# Patient Record
Sex: FEMALE | Race: WHITE | HISPANIC OR LATINO | Employment: OTHER | URBAN - METROPOLITAN AREA
[De-identification: names, ages, dates, MRNs, and addresses within clinical notes are randomized per-mention and may not be internally consistent; named-entity substitution may affect disease eponyms.]

---

## 2017-02-15 ENCOUNTER — OFFICE VISIT (OUTPATIENT)
Dept: ENDOCRINOLOGY | Facility: CLINIC | Age: 67
End: 2017-02-15
Payer: COMMERCIAL

## 2017-02-15 VITALS
WEIGHT: 100.5 LBS | HEART RATE: 76 BPM | HEIGHT: 59 IN | SYSTOLIC BLOOD PRESSURE: 118 MMHG | BODY MASS INDEX: 20.26 KG/M2 | DIASTOLIC BLOOD PRESSURE: 70 MMHG

## 2017-02-15 DIAGNOSIS — M81.0 OSTEOPOROSIS, POST-MENOPAUSAL: Primary | ICD-10-CM

## 2017-02-15 PROCEDURE — 99243 OFF/OP CNSLTJ NEW/EST LOW 30: CPT | Mod: S$GLB,,, | Performed by: INTERNAL MEDICINE

## 2017-02-15 PROCEDURE — 99999 PR PBB SHADOW E&M-EST. PATIENT-LVL III: CPT | Mod: PBBFAC,,, | Performed by: INTERNAL MEDICINE

## 2017-02-15 RX ORDER — ALENDRONATE SODIUM 70 MG/1
1 TABLET ORAL
Refills: 11 | COMMUNITY
Start: 2017-02-04 | End: 2017-09-05

## 2017-02-15 NOTE — LETTER
February 15, 2017      Vignesh Jones MD  [address]  [address]  [facility] [address]           [facility]  [address]  [facility] [address]  Phone: [phone]  Fax: [phone]          Patient: Alicia Toussaint   MR Number: [id number]   YOB: 1950   Date of Visit: 2/15/2017       Dear Dr. Vignesh Jones:    Thank you for referring Alicia Toussaint to me for evaluation. Attached you will find relevant portions of my assessment and plan of care.    If you have questions, please do not hesitate to call me. I look forward to following Alicia Toussaint along with you.    Sincerely,    Gulshan Aleman MD    Enclosure  CC:  No Recipients    If you would like to receive this communication electronically, please contact [email or web address] or [phone] to request more information on [employer] Link access.    For providers and/or their staff who would like to refer a patient to [facility], please contact us through our one-stop-shop provider referral line, [facility], at [phone].    If you feel you have received this communication in error or would no longer like to receive these types of communications, please e-mail [email or web address]

## 2017-02-15 NOTE — MR AVS SNAPSHOT
[facility]  [address]  [facility] [address]  Phone: [phone]  Fax: [phone]                  Alicia Toussaint   2/15/2017 1:30 PM   Office Visit    Description:  Female : 1950   Provider:  Gulshan Aleman MD   Department:  [facility] - Endo/Diab/Metab           Reason for Visit     Osteoporosis                To Do List           Goals (5 Years of Data)     None      [facility]     [facility] On Call Nurse [facility] -  Assistance  Registered nurses in the [facility] On Call Center provide clinical advisement, health education, appointment booking, and other advisory services.  Call for this free service at [phone].             Medications           Message regarding Medications     Verify the changes and/or additions to your medication regime listed below are the same as discussed with your clinician today.  If any of these changes or additions are incorrect, please notify your healthcare provider.        STOP taking these medications     azithromycin (Z-DHAVAL) 250 MG tablet Take 2 tablets by mouth on day 1; Take 1 tablet by mouth on days 2-5           Verify that the below list of medications is an accurate representation of the medications you are currently taking.  If none reported, the list may be blank. If incorrect, please contact your healthcare provider. Carry this list with you in case of emergency.           Current Medications     alendronate (FOSAMAX) 70 MG tablet Take 1 tablet by mouth every 7 days.    ascorbic acid (VITAMIN C) 100 MG tablet Take 100 mg by mouth once daily.    calcium carbonate 1250 MG capsule Take 1,250 mg by mouth 2 (two) times daily with meals.    fish oil-omega-3 fatty acids 300-1,000 mg capsule Take 2 g by mouth once daily.    multivitamin (MULTIVITAMIN) per tablet Take 1 tablet by mouth once daily.      alendronate-vitamin D3 (FOSAMAX PLUS D) 70-2,800 mg-unit per tablet Take 1 tablet by mouth every 7 days.           Clinical  "Reference Information           Your Vitals Were     BP Pulse Height Weight BMI    118/70 (BP Location: Left arm, Patient Position: Sitting, BP Method: Manual) 76 4' 11" (1.499 m) 45.6 kg (100 lb 8.5 oz) 20.3 kg/m2      Blood Pressure          Most Recent Value    BP  118/70      Allergies as of 2/15/2017     Aspirin    Tessalon [Benzonatate]      Immunizations Administered on Date of Encounter - 2/15/2017     None      [facility] Sign-Up     Activating your [facility] account is as easy as 1-2-3!     1) Visit [email or web address], select Sign Up Now, enter this activation code and your date of birth, then select Next.  [email or web address]  Expires: 4/1/2017  2:17 PM      2) Create a username and password to use when you visit [facility] in the future and select a security question in case you lose your password and select Next.    3) Enter your e-mail address and click Sign Up!    Additional Information  If you have questions, please e-mail [email or web address] or call [phone] to talk to our [facility] staff. Remember, [facility] is NOT to be used for urgent needs. For medical emergencies, dial 911.         Instructions    Osteoporosis:   Very petite  Osteoporosis of the lumbar spine only  Tolerating alendronate well  Recommend continue  Continue Calcium and Vit D 1 tablet per day    Continuing walking    BMD in one year Feb 2018       Language Assistance Services     ATTENTION: Language assistance services are available, free of charge. Please call [phone].      ATENCIÓN: Si habla español, tiene a ramos disposición servicios gratuitos de asistencia lingüística. Llame al [phone].     CHRIS Ý: N?u b?n nói Ti?ng Vi?t, có các d?ch v? h? tr? ngôn ng? mi?n phí dành cho b?n. G?i s? [phone].         [facility] complies with applicable Federal civil rights laws and does not discriminate on the basis of race, color, national origin, age, disability, or sex.        "

## 2017-02-15 NOTE — PATIENT INSTRUCTIONS
Osteoporosis:   Very petite  Osteoporosis of the lumbar spine only  Tolerating alendronate well  Recommend continue  Continue Calcium and Vit D 1 tablet per day    Continuing walking    BMD in one year Feb 2018

## 2017-02-15 NOTE — PROGRESS NOTES
Subjective:      Patient ID: Alicia Toussaint is a 66 y.o. female.    Chief Complaint:  Osteoporosis      History of Present Illness  Osteoporosis:  No hx of fractures  In past running    Lumbar spine -3  Fem neck -0.9 and-1.3  Total hip -1.2 and -1.4    Height 4 feet 11 inches  High school weight 98 lb  Weight loss one year ago. Worries  Sleep disorder in 2015 and ok    Children 2, + breast feeding 6 months  1 grandchild    Exercise:Walking 6 days per week    Fosamax for 4 months   No symptoms      Review of Systems   Constitutional: Negative for fatigue and unexpected weight change.   HENT: Negative for hearing loss.    Eyes: Negative for visual disturbance.   Respiratory: Negative for apnea, cough, chest tightness and shortness of breath.    Cardiovascular: Negative for chest pain, palpitations and leg swelling.   Gastrointestinal: Positive for constipation. Negative for abdominal pain, diarrhea, nausea and vomiting.   Genitourinary: Negative for dysuria, frequency, menstrual problem and vaginal discharge.   Musculoskeletal: Negative for arthralgias, back pain and gait problem.   Skin: Negative for rash.   Neurological: Negative for dizziness, tremors, weakness, light-headedness, numbness and headaches.   Psychiatric/Behavioral: Negative for sleep disturbance. The patient is not nervous/anxious.        Objective:   Physical Exam   Constitutional: She is oriented to person, place, and time. She appears well-developed and well-nourished.   Very petite   HENT:   Head: Normocephalic and atraumatic.   Eyes: EOM are normal. Pupils are equal, round, and reactive to light. No scleral icterus.   Neck: No thyromegaly present.   Cardiovascular: Normal rate, regular rhythm, normal heart sounds and intact distal pulses.  Exam reveals no gallop.    No murmur heard.  Pulmonary/Chest: Effort normal and breath sounds normal. No respiratory distress. She has no wheezes. She has no rales. She exhibits no tenderness.   Abdominal: Soft.  Bowel sounds are normal. She exhibits no mass. There is no tenderness. There is no rebound.   Musculoskeletal: Normal range of motion. She exhibits no edema or tenderness.   Tandem gait normal   Lymphadenopathy:     She has no cervical adenopathy.   Neurological: She is alert and oriented to person, place, and time. She has normal reflexes. No cranial nerve deficit. Coordination normal.   Skin: Skin is warm and dry. No rash noted.   Psychiatric: She has a normal mood and affect. Her behavior is normal. Thought content normal.   Vitals reviewed.      Lab Review:   Results for orders placed or performed in visit on 10/11/16   Comprehensive metabolic panel   Result Value Ref Range    Glucose 86 65 - 99 mg/dL    BUN, Bld 17 7 - 25 mg/dL    Creatinine 0.69 0.50 - 0.99 mg/dL    eGFR if non  92 > OR = 60 mL/min/1.73m2    eGFR if African American 107 > OR = 60 mL/min/1.73m2    BUN/Creatinine Ratio NOT APPLICABLE 6 - 22 (calc)    Sodium 140 135 - 146 mmol/L    Potassium 4.5 3.5 - 5.3 mmol/L    Chloride 105 98 - 110 mmol/L    CO2 27 20 - 31 mmol/L    Calcium 9.4 8.6 - 10.4 mg/dL    Total Protein 6.5 6.1 - 8.1 g/dL    Albumin 4.1 3.6 - 5.1 g/dL    Globulin, Total 2.4 1.9 - 3.7 g/dL (calc)    Albumin/Globulin Ratio 1.7 1.0 - 2.5 (calc)    Total Bilirubin 0.6 0.2 - 1.2 mg/dL    Alkaline Phosphatase 55 33 - 130 U/L    AST 22 10 - 35 U/L    ALT 17 6 - 29 U/L   Lipid panel   Result Value Ref Range    Non HDL Chol. (LDL+VLDL) 115 mg/dL (calc)   CBC auto differential   Result Value Ref Range    WBC 6.6 3.8 - 10.8 Thousand/uL    RBC 4.80 3.80 - 5.10 Million/uL    Hemoglobin 14.2 11.7 - 15.5 g/dL    Hematocrit 42.6 35.0 - 45.0 %    MCV 88.8 80.0 - 100.0 fL    MCH 29.6 27.0 - 33.0 pg    MCHC 33.3 32.0 - 36.0 g/dL    RDW 14.2 11.0 - 15.0 %    Platelets 213 140 - 400 Thousand/uL    MPV 9.6 7.5 - 11.5 fL    Neutrophils Absolute 3115 1500 - 7800 cells/uL    Lymph # 2699 850 - 3900 cells/uL    Mono # 521 200 - 950 cells/uL     Eos # 257 15 - 500 cells/uL    Baso # 7 0 - 200 cells/uL    Neutrophils Relative 47.2 %    Lymph% 40.9 %    Mono% 7.9 %    Eosinophil% 3.9 %    Basophil% 0.1 %   Hemoglobin A1c   Result Value Ref Range    A1c 5.8 (H) <5.7 % of total Hgb   TSH   Result Value Ref Range    TSH 4.28 0.40 - 4.50 mIU/L   Cholesterol, total   Result Value Ref Range    Cholesterol 190 125 - 200 mg/dL   HDL cholesterol   Result Value Ref Range    HDL 75 > OR = 46 mg/dL   Triglycerides   Result Value Ref Range    Triglycerides 60 <150 mg/dL   LDL-Cholesterol   Result Value Ref Range    LDL Cholesterol 103 <130 mg/dL (calc)   HDL/Cholesterol Ratio   Result Value Ref Range    HDL/Chol Ratio 2.5 < OR = 5.0 (calc)         Assessment:     Osteoporosis:   Very petite  Osteoporosis of the lumbar spine only  Tolerating alendronate well  Recommend continue  Continue Calcium and Vit D 1 tablet per day    Continuing walking    BMD in one year Feb 2018    Plan:   Return PRN  PRR

## 2017-05-19 PROBLEM — Z11.59 ENCOUNTER FOR HEPATITIS C SCREENING TEST FOR LOW RISK PATIENT: Status: ACTIVE | Noted: 2017-05-19

## 2017-07-23 PROBLEM — R63.4 ABNORMAL WEIGHT LOSS: Status: ACTIVE | Noted: 2017-07-23

## 2017-07-23 PROBLEM — E55.9 VITAMIN D DEFICIENCY: Status: ACTIVE | Noted: 2017-07-23

## 2017-07-23 PROBLEM — E78.00 HYPERCHOLESTEREMIA: Status: ACTIVE | Noted: 2017-07-23

## 2017-07-23 PROBLEM — R79.89 ELEVATED TSH: Status: ACTIVE | Noted: 2017-07-23

## 2017-07-31 PROBLEM — Z78.9 KNOWN MEDICAL PROBLEMS: Status: ACTIVE | Noted: 2017-07-31

## 2017-09-05 ENCOUNTER — OFFICE VISIT (OUTPATIENT)
Dept: OBSTETRICS AND GYNECOLOGY | Facility: CLINIC | Age: 67
End: 2017-09-05
Attending: OBSTETRICS & GYNECOLOGY
Payer: MEDICARE

## 2017-09-05 VITALS
WEIGHT: 95.88 LBS | SYSTOLIC BLOOD PRESSURE: 120 MMHG | HEIGHT: 59 IN | DIASTOLIC BLOOD PRESSURE: 78 MMHG | BODY MASS INDEX: 19.33 KG/M2

## 2017-09-05 DIAGNOSIS — Z78.0 POSTMENOPAUSAL STATUS: ICD-10-CM

## 2017-09-05 DIAGNOSIS — Z01.419 WELL WOMAN EXAM WITH ROUTINE GYNECOLOGICAL EXAM: Primary | ICD-10-CM

## 2017-09-05 DIAGNOSIS — M81.0 AGE-RELATED OSTEOPOROSIS WITHOUT CURRENT PATHOLOGICAL FRACTURE: ICD-10-CM

## 2017-09-05 DIAGNOSIS — Z12.31 VISIT FOR SCREENING MAMMOGRAM: ICD-10-CM

## 2017-09-05 PROCEDURE — G0101 CA SCREEN;PELVIC/BREAST EXAM: HCPCS | Mod: S$GLB,,, | Performed by: OBSTETRICS & GYNECOLOGY

## 2017-09-05 PROCEDURE — 99999 PR PBB SHADOW E&M-EST. PATIENT-LVL III: CPT | Mod: PBBFAC,,, | Performed by: OBSTETRICS & GYNECOLOGY

## 2017-09-05 RX ORDER — RISEDRONATE SODIUM 150 MG/1
150 TABLET, FILM COATED ORAL
Qty: 1 TABLET | Refills: 11 | Status: SHIPPED | OUTPATIENT
Start: 2017-09-05 | End: 2017-09-22

## 2017-09-05 RX ORDER — RISEDRONATE SODIUM 35 MG/1
35 TABLET, FILM COATED ORAL
Qty: 30 TABLET | Refills: 11 | Status: CANCELLED | OUTPATIENT
Start: 2017-09-05 | End: 2018-09-05

## 2017-09-05 NOTE — PROGRESS NOTES
Alicia Toussaint is a 67 y.o. year old  female who presents for routine GYN exam.  She is postmenopausal, not on HRT.  No bleeding.  Denies hot flashes / sweats.  She has a history of osteoporosis and has been taking Fosamax weekly, seeing Dr. Aleman.  Her last BMD was two years ago.  Denies changes in her medical / surgical history over the past year.  No GYN complaints.    Pap 2016: Negative    BMD 9/11/15: Osteoporosis (Spine T-3.0, Hip T-.9)      Past Medical History:   Diagnosis Date    Abnormal weight loss 2017    Complication of anesthesia     colonoscopy-cardiac arrest from anes    Elevated TSH 2017    Hypercholesteremia 2017    Osteoporosis, post-menopausal     Vitamin D deficiency 2017       Past Surgical History:   Procedure Laterality Date    COLONOSCOPY      KNEE ARTHROPLASTY  1985    TUBAL LIGATION         OB History      Para Term  AB Living    2 2 2     2    SAB TAB Ectopic Multiple Live Births            2            ROS:  GENERAL: Reports some weight gain from last year.   SKIN: Denies rash or lesions.   HEAD: Denies head injury or headache.   NODES: Denies enlarged lymph nodes.   CHEST: Denies chest pain or shortness of breath.   CARDIOVASCULAR: Denies palpitations or left sided chest pain.   ABDOMEN: Reports some constipation.  No abdominal pain, nausea, vomiting or rectal bleeding.   URINARY: No dysuria or hematuria.  REPRODUCTIVE: See HPI.   BREASTS: Denies pain, lumps, or nipple discharge.   HEMATOLOGIC: No easy bruisability or excessive bleeding.   MUSCULOSKELETAL: Denies joint pain.  NEUROLOGIC: Denies syncope or weakness.   PSYCHIATRIC: Denies depression.     PE:   (chaperone present during entire exam)  APPEARANCE: Well nourished, well developed, in no acute distress.  BREASTS: Symmetrical, no skin changes or visible lesions. No palpable masses, nipple discharge or adenopathy bilaterally.  ABDOMEN: Soft. No tenderness or masses.  No hernias. No CVA tenderness.  VULVA: Atrophic. No lesions. Normal female genitalia.  URETHRAL MEATUS: Normal size and location, no lesions, no prolapse.  URETHRA: No masses, tenderness, prolapse or scarring.  VAGINA: Atrophic.  No lesions, no discharge, no significant cystocele or rectocele.  CERVIX: No lesions and discharge.   UTERUS: Normal size, regular shape, mobile, non-tender, bladder base nontender.  ADNEXA: No masses, tenderness or CDS nodularity.  ANUS PERINEUM: Normal.    Diagnosis:  1. Well woman exam with routine gynecological exam    2. Postmenopausal status    3. Visit for screening mammogram    4. Age-related osteoporosis without current pathological fracture          PLAN:    Orders Placed This Encounter    Mammo Digital Screening Bilat with Billy without CAD    DXA Bone Density Spine And Hip    risedronate (ACTONEL) 150 MG Tab       Patient was counseled today on postmenopausal issues, including osteoporosis.  She wants to convert from weekly Fosamax to monthly Actonel.  We reviewed Actonel: r / b / studies, correct usage.  To update BMD this year.    Follow-up in 1 year.

## 2017-09-22 ENCOUNTER — TELEPHONE (OUTPATIENT)
Dept: OBSTETRICS AND GYNECOLOGY | Facility: CLINIC | Age: 67
End: 2017-09-22

## 2017-09-22 NOTE — TELEPHONE ENCOUNTER
----- Message from Bird Shelton sent at 9/22/2017  4:40 PM CDT -----  Contact: Jazmin with [facility]  x_ 1st Request   _ 2nd Request   _ 3rd Request     Who: JACQUELINE JUNE [[id number]]    Why: Pharmacy is requesting refill on Rx alendronate (FOSAMAX) 70 MG tablet be called in to [phone]    What Number to Call Back: [phone]    When to Expect a call back: (Before the end of the day)   -- if call after 3:00 call back will be tomorrow.

## 2017-09-25 ENCOUNTER — TELEPHONE (OUTPATIENT)
Dept: OBSTETRICS AND GYNECOLOGY | Facility: CLINIC | Age: 67
End: 2017-09-25

## 2017-09-25 NOTE — TELEPHONE ENCOUNTER
----- Message from Noel Lewis MA sent at 9/25/2017 11:49 AM CDT -----  Contact: Krista with [facility] in [place]  Pt was prescribed FOSAMAX PLUS D and Krista was calling to see if the pt can be prescribed just the Fosamax and get the Vitamin D over the counter so that her insurance can cover cost.  Krista can be reached at [phone].  Thanks FL

## 2017-09-28 ENCOUNTER — TELEPHONE (OUTPATIENT)
Dept: OBSTETRICS AND GYNECOLOGY | Facility: CLINIC | Age: 67
End: 2017-09-28

## 2017-09-28 RX ORDER — ALENDRONATE SODIUM 70 MG/1
70 TABLET ORAL
Qty: 4 TABLET | Refills: 11 | Status: SHIPPED | OUTPATIENT
Start: 2017-09-28 | End: 2019-07-31 | Stop reason: ALTCHOICE

## 2017-09-28 NOTE — TELEPHONE ENCOUNTER
----- Message from Iveth Ugarte sent at 9/28/2017  1:16 PM CDT -----  Contact: self  x_  1st Request  _  2nd Request  _  3rd Request    Who: pt    Why: pt needs Please send Rx for Fosamax without vitamin D. Refilled.. Please advise...    What Number to Call Back: [phone] or [phone]  When to Expect a call back: (Before the end of the day)   -- if call after 3:00 call back will be tomorrow.

## 2017-10-13 ENCOUNTER — HOSPITAL ENCOUNTER (OUTPATIENT)
Dept: RADIOLOGY | Facility: OTHER | Age: 67
Discharge: HOME | End: 2017-10-13
Attending: OBSTETRICS & GYNECOLOGY
Payer: MEDICARE

## 2017-10-13 DIAGNOSIS — M81.0 AGE-RELATED OSTEOPOROSIS WITHOUT CURRENT PATHOLOGICAL FRACTURE: ICD-10-CM

## 2017-10-13 DIAGNOSIS — Z12.31 VISIT FOR SCREENING MAMMOGRAM: ICD-10-CM

## 2017-10-13 PROCEDURE — 77063 BREAST TOMOSYNTHESIS BI: CPT | Mod: 26,,, | Performed by: RADIOLOGY

## 2017-10-13 PROCEDURE — 77080 DXA BONE DENSITY AXIAL: CPT | Mod: 26,,, | Performed by: INTERNAL MEDICINE

## 2017-10-13 PROCEDURE — 77067 SCR MAMMO BI INCL CAD: CPT | Mod: TC

## 2017-10-13 PROCEDURE — 77067 SCR MAMMO BI INCL CAD: CPT | Mod: 26,,, | Performed by: RADIOLOGY

## 2017-10-13 PROCEDURE — 77080 DXA BONE DENSITY AXIAL: CPT | Mod: TC

## 2017-10-18 ENCOUNTER — TELEPHONE (OUTPATIENT)
Dept: OBSTETRICS AND GYNECOLOGY | Facility: CLINIC | Age: 67
End: 2017-10-18

## 2017-10-18 NOTE — TELEPHONE ENCOUNTER
Called patient:    Discussed results of BMD:    A quantitative bone mineral density was obtained using the DEXA technique.  The bone mineral density measured from L1 through L4 is 0.849g/cm2.  This corresponds to a T score of -2.8 and a Z score of -0.4.  This is 3.5% higher compared to prior.    The bone mineral density within the left femoral neck measures 0.868 g/cm2.  This corresponds to a T score of    -1.2 and a Z score of 0.7.    The bone mineral density within the right femoral neck measures 0.863 g/cm2.  This corresponds to a T score of    -0.3 and a Z score of 0.7.    Mean femur neck density is -2.4% compared to prior.    The FRAX score (10 year probability of fracture) is 4.1% for major osteoporotic fracture and 0.4% for hip fracture.      Impression       Osteoporosis     Previously Spine T-3.0, Hip T-.8    REC: continue Fosamax, calcium, vitamin D    Aware of negative mammogram

## 2018-04-19 ENCOUNTER — TELEPHONE (OUTPATIENT)
Dept: OBSTETRICS AND GYNECOLOGY | Facility: CLINIC | Age: 68
End: 2018-04-19

## 2018-04-19 NOTE — TELEPHONE ENCOUNTER
----- Message from Kate Chapman sent at 4/19/2018  8:24 AM CDT -----  Contact: self  Pt called to find out if she can be seen tomorrow because she think she has a yeast infection. She can be reached at [phone]

## 2018-04-20 ENCOUNTER — OFFICE VISIT (OUTPATIENT)
Dept: OBSTETRICS AND GYNECOLOGY | Facility: CLINIC | Age: 68
End: 2018-04-20
Attending: OBSTETRICS & GYNECOLOGY
Payer: MEDICARE

## 2018-04-20 VITALS
WEIGHT: 95 LBS | BODY MASS INDEX: 19.15 KG/M2 | HEIGHT: 59 IN | SYSTOLIC BLOOD PRESSURE: 120 MMHG | DIASTOLIC BLOOD PRESSURE: 82 MMHG

## 2018-04-20 DIAGNOSIS — N94.9 VAGINAL DISCOMFORT: ICD-10-CM

## 2018-04-20 DIAGNOSIS — R35.0 URINARY FREQUENCY: Primary | ICD-10-CM

## 2018-04-20 DIAGNOSIS — Z78.0 POSTMENOPAUSAL STATUS: ICD-10-CM

## 2018-04-20 PROCEDURE — 87088 URINE BACTERIA CULTURE: CPT

## 2018-04-20 PROCEDURE — 99999 PR PBB SHADOW E&M-EST. PATIENT-LVL III: CPT | Mod: PBBFAC,,, | Performed by: OBSTETRICS & GYNECOLOGY

## 2018-04-20 PROCEDURE — 99213 OFFICE O/P EST LOW 20 MIN: CPT | Mod: S$GLB,,, | Performed by: OBSTETRICS & GYNECOLOGY

## 2018-04-20 PROCEDURE — 87086 URINE CULTURE/COLONY COUNT: CPT

## 2018-04-20 PROCEDURE — 87510 GARDNER VAG DNA DIR PROBE: CPT

## 2018-04-20 PROCEDURE — 87186 SC STD MICRODIL/AGAR DIL: CPT

## 2018-04-20 PROCEDURE — 87077 CULTURE AEROBIC IDENTIFY: CPT

## 2018-04-20 PROCEDURE — 87480 CANDIDA DNA DIR PROBE: CPT

## 2018-04-20 RX ORDER — NITROFURANTOIN 25; 75 MG/1; MG/1
100 CAPSULE ORAL 2 TIMES DAILY
Qty: 14 CAPSULE | Refills: 0 | Status: SHIPPED | OUTPATIENT
Start: 2018-04-20 | End: 2018-04-27

## 2018-04-20 NOTE — PROGRESS NOTES
Chief Complaint   Patient presents with    Urinary Tract Infection     burning     Urinary Frequency    Vaginal Pain       HPI:  Alicia Toussaint is a 67 y.o. female patient  who presents today for evaluation of urinary symptoms as well as questionable vaginal discomfort.  For the past 5 days, she describes having urinary urgency, frequency, and dysuria.  She has increased her water intake but still has these symptoms.  She is unsure if she has vaginal discomfort as well.  Denies vaginal itching, discharge, or odor.  Reports being on amoxicillin three weeks ago for a root canal.  No LMP recorded. Patient is postmenopausal.     Urine dip: +1 blood, +leukocytes    Past Medical History:   Diagnosis Date    Abnormal weight loss 2017    Complication of anesthesia     colonoscopy-cardiac arrest from anes    Elevated TSH 2017    Hypercholesteremia 2017    Osteoporosis, post-menopausal     Vitamin D deficiency 2017       Past Surgical History:   Procedure Laterality Date    COLONOSCOPY  2016    KNEE ARTHROPLASTY  1985    TUBAL LIGATION           ROS:  GENERAL: Feeling well overall.   SKIN: Denies rash or lesions.   HEAD: Denies head injury or headache.   NODES: Denies enlarged lymph nodes.   CHEST: Denies chest pain or shortness of breath.   CARDIOVASCULAR: Denies palpitations or left sided chest pain.   ABDOMEN: No abdominal pain, nausea, vomiting or rectal bleeding.   URINARY: Reports frequency, urgency, and dysuria.  REPRODUCTIVE: See HPI.   BREASTS: Denies pain, lumps, or nipple discharge.   HEMATOLOGIC: No easy bruisability or excessive bleeding.   MUSCULOSKELETAL: Denies joint pain or swelling.   NEUROLOGIC: Denies syncope or weakness.   PSYCHIATRIC: Denies depression.    PE:   (chaperone present during entire exam)  APPEARANCE: Well nourished, well developed, in no acute distress.  ABDOMEN: Soft.  Slight suprapubic discomfort.  No guarding.  No rebound.  VULVA: Atrophic.  No  lesions. Normal female genitalia.  URETHRAL MEATUS: Normal size and location, no lesions, no prolapse.  URETHRA: No masses, tenderness, prolapse or scarring.  VAGINA: Atrophic, no discharge, no significant cystocele or rectocele.  CERVIX: No lesions and discharge.   UTERUS: Normal size, regular shape, mobile, non-tender.  Bladder base mildly tender.  ADNEXA: No masses, tenderness or CDS nodularity.  ANUS PERINEUM: Normal.    Diagnosis:  1. Urinary frequency    2. Vaginal discomfort    3. Postmenopausal status          PLAN:    Orders Placed This Encounter    Urine culture    Vaginosis Screen by DNA Probe    nitrofurantoin, macrocrystal-monohydrate, (MACROBID) 100 MG capsule       Patient was counseled today on her exam findings which are consistent with a UTI.  She will begin Macrobid and we will send her urine for culture.  She was also encouraged to continue with increased water intake.  Affirm was performed to rule out vaginitis.    Follow-up for annual exam.    Total time of visit: 15 minutes (counseling >50% of time)

## 2018-04-21 LAB
CANDIDA RRNA VAG QL PROBE: NEGATIVE
G VAGINALIS RRNA GENITAL QL PROBE: NEGATIVE
T VAGINALIS RRNA GENITAL QL PROBE: NEGATIVE

## 2018-04-23 LAB — BACTERIA UR CULT: NORMAL

## 2018-09-07 ENCOUNTER — OFFICE VISIT (OUTPATIENT)
Dept: OBSTETRICS AND GYNECOLOGY | Facility: CLINIC | Age: 68
End: 2018-09-07
Attending: OBSTETRICS & GYNECOLOGY
Payer: MEDICARE

## 2018-09-07 VITALS
SYSTOLIC BLOOD PRESSURE: 102 MMHG | WEIGHT: 102.5 LBS | DIASTOLIC BLOOD PRESSURE: 70 MMHG | HEIGHT: 59 IN | BODY MASS INDEX: 20.66 KG/M2

## 2018-09-07 DIAGNOSIS — Z12.4 PAP SMEAR FOR CERVICAL CANCER SCREENING: ICD-10-CM

## 2018-09-07 DIAGNOSIS — M81.0 AGE-RELATED OSTEOPOROSIS WITHOUT CURRENT PATHOLOGICAL FRACTURE: ICD-10-CM

## 2018-09-07 DIAGNOSIS — Z78.0 POSTMENOPAUSAL STATUS: ICD-10-CM

## 2018-09-07 DIAGNOSIS — Z12.31 VISIT FOR SCREENING MAMMOGRAM: ICD-10-CM

## 2018-09-07 DIAGNOSIS — Z01.419 WELL WOMAN EXAM WITH ROUTINE GYNECOLOGICAL EXAM: Primary | ICD-10-CM

## 2018-09-07 PROCEDURE — G0101 CA SCREEN;PELVIC/BREAST EXAM: HCPCS | Mod: PBBFAC | Performed by: OBSTETRICS & GYNECOLOGY

## 2018-09-07 PROCEDURE — 99999 PR PBB SHADOW E&M-EST. PATIENT-LVL III: CPT | Mod: PBBFAC,,, | Performed by: OBSTETRICS & GYNECOLOGY

## 2018-09-07 PROCEDURE — 88175 CYTOPATH C/V AUTO FLUID REDO: CPT

## 2018-09-07 PROCEDURE — 99213 OFFICE O/P EST LOW 20 MIN: CPT | Mod: PBBFAC,25 | Performed by: OBSTETRICS & GYNECOLOGY

## 2018-09-07 PROCEDURE — G0101 CA SCREEN;PELVIC/BREAST EXAM: HCPCS | Mod: S$PBB,,, | Performed by: OBSTETRICS & GYNECOLOGY

## 2018-09-07 NOTE — PROGRESS NOTES
Alicia Toussaint is a 68 y.o. female  who presents for annual exam.  She is postmenopausal, not on HRT.  Denies bleeding, hot flashes, and sweats.  She has a history of osteoporosis and is currently on Fosamax.  For the past several months, she describes noting GI upset, sweating, and lightheadedness immediately after taking Fosamax.  Her last BMD was 10/2017 which showed improvement in her BMD.   No LMP recorded. Patient is postmenopausal.     BMD 10/13/17:  Osteoporosis (Spine T-2.8, Hip T-1.2, T-.3)        Past Medical History:   Diagnosis Date    Abnormal weight loss 2017    Complication of anesthesia     colonoscopy-cardiac arrest from anes    Elevated TSH 2017    Hypercholesteremia 2017    Osteoporosis, post-menopausal     Vitamin D deficiency 2017       Past Surgical History:   Procedure Laterality Date    COLONOSCOPY      KNEE ARTHROPLASTY  1985    TUBAL LIGATION         OB History      Para Term  AB Living    2 2 2     2    SAB TAB Ectopic Multiple Live Births            2          ROS:  GENERAL: Reports some weight gain.   SKIN: Denies rash or lesions.   HEAD: Denies head injury or headache.   NODES: Denies enlarged lymph nodes.   CHEST: Denies chest pain or shortness of breath.   CARDIOVASCULAR: Denies palpitations or left sided chest pain.   ABDOMEN: No abdominal pain, nausea, vomiting or rectal bleeding.   URINARY: No dysuria or hematuria.  REPRODUCTIVE: See HPI.   BREASTS: Denies pain, lumps, or nipple discharge.   HEMATOLOGIC: No easy bruisability or excessive bleeding.   MUSCULOSKELETAL: Denies joint pain or swelling.   NEUROLOGIC: Denies syncope or weakness.   PSYCHIATRIC: Denies depression.    PE:   (chaperone present during entire exam)  APPEARANCE: Well nourished, well developed, in no acute distress.  BREASTS: Symmetrical, no skin changes or visible lesions. No palpable masses, nipple discharge or adenopathy bilaterally.  ABDOMEN: Soft. No  tenderness or masses. No CVA tenderness.  VULVA: Atrophic. No lesions. Normal female genitalia.  URETHRAL MEATUS: Normal size and location, no lesions, no prolapse.  URETHRA: No masses, tenderness, prolapse or scarring.  VAGINA: Atrophic, no discharge, no significant cystocele or rectocele.  CERVIX: No lesions and discharge. Stenotic.  PAP done.  UTERUS: Normal size, regular shape, mobile, non-tender, bladder base nontender.  ADNEXA: No masses, tenderness or CDS nodularity.  ANUS PERINEUM: Normal.      Diagnosis:  1. Well woman exam with routine gynecological exam    2. Postmenopausal status    3. Pap smear for cervical cancer screening    4. Visit for screening mammogram    5. Age-related osteoporosis without current pathological fracture          PLAN:    Orders Placed This Encounter    Mammo Digital Screening Bilat with Tomos[employer]_CAD    Ambulatory consult to Endocrinology    Liquid-based pap smear, screening       Patient was counseled today on postmenopausal issues, including osteoporosis and her usage of Fosamax.  With GI upset, we discussed possible conversion to an injectable treatment.  She has seen Dr. Aleman in the past for her osteoporosis.  We will schedule an Endocrine consult to discuss a management plan for her osteoporosis.    Follow-up in 1 year.

## 2018-10-15 ENCOUNTER — HOSPITAL ENCOUNTER (OUTPATIENT)
Dept: RADIOLOGY | Facility: OTHER | Age: 68
Discharge: HOME | End: 2018-10-15
Attending: OBSTETRICS & GYNECOLOGY
Payer: MEDICARE

## 2018-10-15 DIAGNOSIS — Z12.31 VISIT FOR SCREENING MAMMOGRAM: ICD-10-CM

## 2018-10-15 PROCEDURE — 77067 SCR MAMMO BI INCL CAD: CPT | Mod: TC

## 2018-10-15 PROCEDURE — 77067 SCR MAMMO BI INCL CAD: CPT | Mod: 26,,, | Performed by: RADIOLOGY

## 2018-10-15 PROCEDURE — 77063 BREAST TOMOSYNTHESIS BI: CPT | Mod: 26,,, | Performed by: RADIOLOGY

## 2018-10-15 PROCEDURE — 77063 BREAST TOMOSYNTHESIS BI: CPT | Mod: TC

## 2018-12-30 PROBLEM — M19.90 ARTHRITIS: Status: ACTIVE | Noted: 2018-12-30

## 2019-09-10 ENCOUNTER — OFFICE VISIT (OUTPATIENT)
Dept: OBSTETRICS AND GYNECOLOGY | Facility: CLINIC | Age: 69
End: 2019-09-10
Attending: OBSTETRICS & GYNECOLOGY
Payer: MEDICARE

## 2019-09-10 VITALS — DIASTOLIC BLOOD PRESSURE: 62 MMHG | BODY MASS INDEX: 18.12 KG/M2 | WEIGHT: 89.75 LBS | SYSTOLIC BLOOD PRESSURE: 112 MMHG

## 2019-09-10 DIAGNOSIS — M81.0 AGE-RELATED OSTEOPOROSIS WITHOUT CURRENT PATHOLOGICAL FRACTURE: ICD-10-CM

## 2019-09-10 DIAGNOSIS — Z01.419 WELL WOMAN EXAM WITH ROUTINE GYNECOLOGICAL EXAM: Primary | ICD-10-CM

## 2019-09-10 DIAGNOSIS — Z78.0 POSTMENOPAUSAL STATUS: ICD-10-CM

## 2019-09-10 DIAGNOSIS — Z12.31 VISIT FOR SCREENING MAMMOGRAM: ICD-10-CM

## 2019-09-10 PROCEDURE — G0101 CA SCREEN;PELVIC/BREAST EXAM: HCPCS | Mod: S$GLB,,, | Performed by: OBSTETRICS & GYNECOLOGY

## 2019-09-10 PROCEDURE — G0101 PR CA SCREEN;PELVIC/BREAST EXAM: ICD-10-PCS | Mod: S$GLB,,, | Performed by: OBSTETRICS & GYNECOLOGY

## 2019-09-10 PROCEDURE — 99999 PR PBB SHADOW E&M-EST. PATIENT-LVL III: ICD-10-PCS | Mod: PBBFAC,,, | Performed by: OBSTETRICS & GYNECOLOGY

## 2019-09-10 PROCEDURE — 99999 PR PBB SHADOW E&M-EST. PATIENT-LVL III: CPT | Mod: PBBFAC,,, | Performed by: OBSTETRICS & GYNECOLOGY

## 2019-10-16 ENCOUNTER — HOSPITAL ENCOUNTER (OUTPATIENT)
Dept: RADIOLOGY | Facility: OTHER | Age: 69
Discharge: HOME | End: 2019-10-16
Attending: OBSTETRICS & GYNECOLOGY
Payer: MEDICARE

## 2019-10-16 VITALS — BODY MASS INDEX: 18.09 KG/M2 | HEIGHT: 59 IN | WEIGHT: 89.75 LBS

## 2019-10-16 DIAGNOSIS — Z12.31 VISIT FOR SCREENING MAMMOGRAM: ICD-10-CM

## 2019-10-16 DIAGNOSIS — M81.0 AGE-RELATED OSTEOPOROSIS WITHOUT CURRENT PATHOLOGICAL FRACTURE: ICD-10-CM

## 2019-10-16 PROCEDURE — 77063 BREAST TOMOSYNTHESIS BI: CPT | Mod: 26,,, | Performed by: INTERNAL MEDICINE

## 2019-10-16 PROCEDURE — 77067 SCR MAMMO BI INCL CAD: CPT | Mod: TC

## 2019-10-16 PROCEDURE — 77080 DXA BONE DENSITY AXIAL: CPT | Mod: 26,,, | Performed by: INTERNAL MEDICINE

## 2019-10-16 PROCEDURE — 77063 MAMMO DIGITAL SCREENING BILAT WITH TOMOSYNTHESIS_CAD: ICD-10-PCS | Mod: 26,,, | Performed by: INTERNAL MEDICINE

## 2019-10-16 PROCEDURE — 77080 DXA BONE DENSITY AXIAL: CPT | Mod: TC

## 2019-10-16 PROCEDURE — 77067 MAMMO DIGITAL SCREENING BILAT WITH TOMOSYNTHESIS_CAD: ICD-10-PCS | Mod: 26,,, | Performed by: INTERNAL MEDICINE

## 2019-10-16 PROCEDURE — 77067 SCR MAMMO BI INCL CAD: CPT | Mod: 26,,, | Performed by: INTERNAL MEDICINE

## 2019-10-16 PROCEDURE — 77080 DEXA BONE DENSITY SPINE HIP: ICD-10-PCS | Mod: 26,,, | Performed by: INTERNAL MEDICINE

## 2019-10-17 ENCOUNTER — TELEPHONE (OUTPATIENT)
Dept: OBSTETRICS AND GYNECOLOGY | Facility: CLINIC | Age: 69
End: 2019-10-17

## 2019-10-17 NOTE — TELEPHONE ENCOUNTER
Called patient:    Message left to call us.    [BMD with osteoporosis in spine T-3.1 (previously T-3.0), osteopenia in hips T-1.3, T-1.4]

## 2019-10-23 ENCOUNTER — TELEPHONE (OUTPATIENT)
Dept: OBSTETRICS AND GYNECOLOGY | Facility: CLINIC | Age: 69
End: 2019-10-23

## 2019-10-23 DIAGNOSIS — M81.0 AGE RELATED OSTEOPOROSIS, UNSPECIFIED PATHOLOGICAL FRACTURE PRESENCE: Primary | ICD-10-CM

## 2019-10-23 NOTE — TELEPHONE ENCOUNTER
----- Message from Jayro Acharya sent at 10/23/2019  9:20 AM CDT -----  Contact: JACQUELINE JUNE [[id number]]  Type:  Patient Returning Call    Who Called: JACQUELINE JUNE [[id number]]    Who Left Message for Patient: Vignesh Jones MD      Does the patient know what this is regarding?:no     Best Call Back Number:[phone], [phone]      Additional Information:

## 2019-10-23 NOTE — TELEPHONE ENCOUNTER
----- Message from Lorena Bowie sent at 10/23/2019  9:15 AM CDT -----  Contact: Self  Type:  Patient Returning Call    Who Called: JACQUELINE JUNE [[id number]]    Who Left Message for Patient: Dr. Jones    Does the patient know what this is regarding?: Yes, test results    Best Call Back Number: [phone]    Additional Information: None     2

## 2019-10-24 NOTE — TELEPHONE ENCOUNTER
----- Message from Jayro Acharya sent at 10/24/2019  8:59 AM CDT -----  Contact: JACQUELINE JUNE [[id number]]  Type:  Patient Returning Call    Who Called: JACQUELINE JUNE [[id number]]    Who Left Message for Patient: Dr. Jones    Does the patient know what this is regarding?:yes     Best Call Back Number : [phone] (home)       Additional Information:

## 2019-10-24 NOTE — TELEPHONE ENCOUNTER
"Called patient:    Discussed results of BMD:    FINDINGS:  The bone mineral density measured from L1 through L4 is 0.817 g/cm2.  This corresponds to a T score of -3.1 and a Z score of -0.6.  The overall density of the spine is 3.8% lower compared to prior.  The bone mineral density within the left femoral neck measures 0.860 g/cm2.  This corresponds to a T score of    -1.3 and a Z score of 0.8.  The bone mineral density within the right femoral neck measures 0.847 g/cm2.  This corresponds to a T score of    -1.4 and a Z score of 0.7.  Total mean femur neck density is 3% lower compared to prior.  The FRAX score (10 year probability of fracture) is 4.2 % for a major osteoporotic fracture and 0.5 % for hip fracture.      Impression     Osteoporosis     Discussed increasing osteoporosis in spine.  In the past, she had been on Fosamax, but stopped secondary to to "jaw pain"    We discussed recommendation for endocrine consult - orders entered.  "

## 2019-10-24 NOTE — TELEPHONE ENCOUNTER
[phone] Number provided to patient to schedule an endocrine appointment. Patient will call and schedule an appointment

## 2019-10-29 ENCOUNTER — TELEPHONE (OUTPATIENT)
Dept: ENDOCRINOLOGY | Facility: CLINIC | Age: 69
End: 2019-10-29

## 2019-10-29 ENCOUNTER — OFFICE VISIT (OUTPATIENT)
Dept: ENDOCRINOLOGY | Facility: CLINIC | Age: 69
End: 2019-10-29
Payer: MEDICARE

## 2019-10-29 ENCOUNTER — LAB VISIT (OUTPATIENT)
Dept: LAB | Facility: HOSPITAL | Age: 69
End: 2019-10-29
Payer: MEDICARE

## 2019-10-29 VITALS
HEART RATE: 69 BPM | HEIGHT: 59 IN | BODY MASS INDEX: 18.76 KG/M2 | SYSTOLIC BLOOD PRESSURE: 110 MMHG | DIASTOLIC BLOOD PRESSURE: 67 MMHG | WEIGHT: 93.06 LBS

## 2019-10-29 DIAGNOSIS — E55.9 VITAMIN D DEFICIENCY: ICD-10-CM

## 2019-10-29 DIAGNOSIS — M81.0 OSTEOPOROSIS, POST-MENOPAUSAL: ICD-10-CM

## 2019-10-29 DIAGNOSIS — M81.0 OSTEOPOROSIS, POST-MENOPAUSAL: Primary | ICD-10-CM

## 2019-10-29 DIAGNOSIS — E78.00 HYPERCHOLESTEREMIA: ICD-10-CM

## 2019-10-29 DIAGNOSIS — M81.0 AGE RELATED OSTEOPOROSIS, UNSPECIFIED PATHOLOGICAL FRACTURE PRESENCE: ICD-10-CM

## 2019-10-29 DIAGNOSIS — R79.89 ELEVATED TSH: ICD-10-CM

## 2019-10-29 DIAGNOSIS — M81.0 AGE RELATED OSTEOPOROSIS, UNSPECIFIED PATHOLOGICAL FRACTURE PRESENCE: Primary | ICD-10-CM

## 2019-10-29 DIAGNOSIS — R94.6 ABNORMAL RESULTS OF THYROID FUNCTION STUDIES: ICD-10-CM

## 2019-10-29 LAB
25(OH)D3+25(OH)D2 SERPL-MCNC: 146 NG/ML (ref 30–96)
ALBUMIN SERPL BCP-MCNC: 4.1 G/DL (ref 3.5–5.2)
ALP SERPL-CCNC: 58 U/L (ref 55–135)
ALT SERPL W/O P-5'-P-CCNC: 24 U/L (ref 10–44)
ANION GAP SERPL CALC-SCNC: 8 MMOL/L (ref 8–16)
AST SERPL-CCNC: 28 U/L (ref 10–40)
BILIRUB SERPL-MCNC: 0.8 MG/DL (ref 0.1–1)
BUN SERPL-MCNC: 17 MG/DL (ref 8–23)
CALCIUM SERPL-MCNC: 9.8 MG/DL (ref 8.7–10.5)
CHLORIDE SERPL-SCNC: 103 MMOL/L (ref 95–110)
CO2 SERPL-SCNC: 30 MMOL/L (ref 23–29)
CREAT SERPL-MCNC: 0.8 MG/DL (ref 0.5–1.4)
EST. GFR  (AFRICAN AMERICAN): >60 ML/MIN/1.73 M^2
EST. GFR  (NON AFRICAN AMERICAN): >60 ML/MIN/1.73 M^2
GLUCOSE SERPL-MCNC: 96 MG/DL (ref 70–110)
POTASSIUM SERPL-SCNC: 4 MMOL/L (ref 3.5–5.1)
PROT SERPL-MCNC: 7.6 G/DL (ref 6–8.4)
SODIUM SERPL-SCNC: 141 MMOL/L (ref 136–145)

## 2019-10-29 PROCEDURE — 99214 OFFICE O/P EST MOD 30 MIN: CPT | Mod: S$GLB,,, | Performed by: NURSE PRACTITIONER

## 2019-10-29 PROCEDURE — 82306 VITAMIN D 25 HYDROXY: CPT

## 2019-10-29 PROCEDURE — 99999 PR PBB SHADOW E&M-EST. PATIENT-LVL III: CPT | Mod: PBBFAC,,, | Performed by: NURSE PRACTITIONER

## 2019-10-29 PROCEDURE — 1101F PT FALLS ASSESS-DOCD LE1/YR: CPT | Mod: CPTII,S$GLB,, | Performed by: NURSE PRACTITIONER

## 2019-10-29 PROCEDURE — 36415 COLL VENOUS BLD VENIPUNCTURE: CPT

## 2019-10-29 PROCEDURE — 80053 COMPREHEN METABOLIC PANEL: CPT

## 2019-10-29 PROCEDURE — 1101F PR PT FALLS ASSESS DOC 0-1 FALLS W/OUT INJ PAST YR: ICD-10-PCS | Mod: CPTII,S$GLB,, | Performed by: NURSE PRACTITIONER

## 2019-10-29 PROCEDURE — 99214 PR OFFICE/OUTPT VISIT, EST, LEVL IV, 30-39 MIN: ICD-10-PCS | Mod: S$GLB,,, | Performed by: NURSE PRACTITIONER

## 2019-10-29 PROCEDURE — 99999 PR PBB SHADOW E&M-EST. PATIENT-LVL III: ICD-10-PCS | Mod: PBBFAC,,, | Performed by: NURSE PRACTITIONER

## 2019-10-29 NOTE — PROGRESS NOTES
Subjective:      Patient ID: Alicia Toussaint is a 69 y.o. female.    Chief Complaint:  Follow-up    History of Present Illness  Alicia Toussaint presents today for follow up of osteoporosis.  Last seen in Feb 2017 by Dr. Aleman. This is her his first visit with me.     With regards to osteoporosis:      Was on Fosamax but stopped around 2017. States that she was on since around 2014. States that she stopped due to indigestion and nausea.     Calcium intake? 1250 mg BID  Vit D intake?  With calcium and fish oil.  Weight bearing exercise-Walking 5 days weekly for one hour. No weight bearing exercise. She was doing pilates and loved it. She would like to resume pilates.   Recent falls? No     No recent fractures   No significant height loss (>2 inches)  No kidney stones    tob use -  None     etoh use- none    Family history: sister with osteopenia.   No current diarrhea or h/o malabsorption  Menopause around age 50     Denies chronic exposure to steroid therapy, anticoagulants, proton pump inhibitors or antiseizure medications.   Denies GERD, indigestion, or gastric bypass surgery.   Denies history of thyroid disease, anemia, kidney stones or kidney disease.     Denies active malignancy, history of malignancy the involved the bone, prior radiation treatment, or unexplained elevations of alk phos on labs     BMD 10/13/17  A quantitative bone mineral density was obtained using the DEXA technique.  The bone mineral density measured from L1 through L4 is 0.849g/cm2.  This corresponds to a T score of -2.8 and a Z score of -0.4. This is 3.5% higher compared to prior.  The bone mineral density within the left femoral neck measures 0.868 g/cm2.  This corresponds to a T score of    -1.2 and a Z score of 0.7.  The bone mineral density within the right femoral neck measures 0.863 g/cm2.  This corresponds to a T score of    -0.3 and a Z score of 0.7.  Mean femur neck density is -2.4% compared to prior.  The FRAX score (10 year  probability of fracture) is 4.1% for major osteoporotic fracture and 0.4% for hip fracture.    Last BMD 10/16/19  COMPARISON:  October 2017    FINDINGS:  The bone mineral density measured from L1 through L4 is 0.817 g/cm2.  This corresponds to a T score of -3.1 and a Z score of -0.6.  The overall density of the spine is 3.8% lower compared to prior.    The bone mineral density within the left femoral neck measures 0.860 g/cm2.  This corresponds to a T score of -1.3 and a Z score of 0.8.    The bone mineral density within the right femoral neck measures 0.847 g/cm2.  This corresponds to a T score of  -1.4 and a Z score of 0.7.    Total mean femur neck density is 3% lower compared to prior.    The FRAX score (10 year probability of fracture) is 4.2 % for a major osteoporotic fracture and 0.5 % for hip fracture.         With regards to abnormal TFT's,     Family history of thyroid disease in her sister    current symptoms:   Reports weight loss -states weight fluctuates  Denies Fatigue   Denies Constipation or frequent BM   Denies Hair loss  Denies any skin changes   Denies Brittle nails  Denies Mental fog   No heat or cold intolerance.   No tremor   Denies anxiety  Denies cp, palpitations, or sob      Ref. Range 5/2/2017 08:07 8/14/2017 09:04 4/3/2018 07:21 10/22/2018 07:41 4/19/2019 08:38   TSH Latest Ref Range: 0.40 - 4.50 mIU/L 5.19 (H) 3.00 5.06 (H) 6.53 (H) 2.78   T4, Free Latest Ref Range: 0.8 - 1.8 ng/dL  1.2  0.8      Review of Systems   Constitutional: Negative for fatigue.   Eyes: Negative for visual disturbance.   Respiratory: Negative for shortness of breath.    Cardiovascular: Negative for chest pain.   Gastrointestinal: Negative for abdominal pain.   Musculoskeletal: Negative for arthralgias.   Skin: Negative for wound.   Neurological: Negative for headaches.   Hematological: Does not bruise/bleed easily.   Psychiatric/Behavioral: Negative for sleep disturbance.     Objective:   Physical Exam  "  Constitutional: She appears well-developed.   Neck: No thyromegaly present.   Cardiovascular: Normal rate.   Pulmonary/Chest: Effort normal.   Abdominal: Soft.   Vitals reviewed.    Visit Vitals  /67   Pulse 69   Ht 4' 11" (1.499 m)   Wt 42.2 kg (93 lb 0.6 oz)   BMI 18.79 kg/m²        Lab Review:   Lab Results   Component Value Date    HGBA1C 5.6 04/19/2019      Lab Results   Component Value Date    CHOL 204 (H) 04/19/2019    HDL 70 04/19/2019    LDLCALC 117 (H) 04/19/2019    TRIG 75 04/19/2019    CHOLHDL 2.9 04/19/2019     Lab Results   Component Value Date     04/19/2019    K 4.4 04/19/2019     04/19/2019    CO2 31 04/19/2019    GLU 91 04/19/2019    BUN 17 04/19/2019    CREATININE 0.74 04/19/2019    CALCIUM 10.0 04/19/2019    PROT 7.0 04/19/2019    ALBUMIN 4.4 04/19/2019    BILITOT 0.7 04/19/2019    ALKPHOS 47 04/19/2019    AST 22 04/19/2019    ALT 20 04/19/2019    ESTGFRAFRICA 98 04/19/2019    EGFRNONAA 84 04/19/2019    TSH 2.78 04/19/2019        Ref. Range 5/5/2015 10:44 5/3/2016 11:25 5/2/2017 08:07 4/3/2018 07:21 4/19/2019 08:38   Vitamin D, 1,25 (OH)2 Latest Ref Range: 18 - 72 pg/mL    37    Vitamin D, 25-OH, D2 Latest Ref Range: See Below ng/mL  <4 <4     Vitamin D, 25-OH, D3 Latest Ref Range: See Below ng/mL  56 46     Vitamin D, 25-OH, Total Latest Ref Range: 30 - 100 ng/mL 49 56 46  >150 (H)   Vitamin D2, 1,25 (OH)2 Latest Units: pg/mL    <8    Vitamin D3, 1,25 (OH)2 Latest Units: pg/mL    37      Assessment and Plan     1. Age related osteoporosis, unspecified pathological fracture presence  Ambulatory referral/consult to Endocrinology    Comprehensive metabolic panel    Vitamin D    Comprehensive metabolic panel    DISCONTINUED: denosumab (PROLIA) injection 60 mg   2. Osteoporosis, post-menopausal     3. Elevated TSH  TSH   4. Vitamin D deficiency  Vitamin D   5. Hypercholesteremia     6. Abnormal results of thyroid function studies   TSH     Osteoporosis, " "post-menopausal  Osteoporosis medications  These are the medications we discussed today for osteoporosis treatment:    - Bisphosphonates:         - these slow down bone loss         - oral forms (Fosamax and Actonel are examples) - taken once weekly or once monthly         - IV form (Reclast) - given intravenously once yearly    - Prolia (denosumab):          - slows down bone loss           - it is a subcutaneous injection (under the skin) every 6 months, given in the office    - Forteo (teriparatide) or Tymlos (abaloparatide):           - medications that "build bone" or increases bone formation           - subcutaneous injection (under the skin) taken once daily that you self-administer at home    For more information on medications: https://www.nof.org/patients/treatment/medicationadherence/  --Start Prolia 60 mg injection every 6 months  --Will obtain a CMP and Vit D today  --Check labs on RTC  -- Risks include low weight,  race, menopause, +FH.  -- Reviewed basics of quantity versus quality  -- Reassuring she is not fracturing   -- RDA of calcium (6837-4102 mg /day in divided doses) and vitamin D 2000iu a day  discussed  -- Calcium from food sources preferred  -- Fall precautions emphasized  -- +weight bearing exercise  -- Pharmacological therapy is recommended for patients with osteopenia if the 10 year probability of a hip fracture is >3% and 10 year probability of other major osteoporotic fractures is >20%.   -- Treatment options and potential side effects discussed for PO bisphosphonates, reclast, Denosumab, and Teriparatide.  -- Low risk for ONJ and atypical fractures reviewed and discussed. Alerted that if dental work needs to be done it should be done prior to initiating therapy   -- Repeat DEXA scan in 2 years time    Elevated TSH  -Discussed thyroid levels are at goal  -No need for thyroid medications at this time  -Check TSH on RTC       Vitamin D deficiency  Continue Vit D supplement in her " calcium and omega pills  She agrees to ensure that she is taking at least 2000 IU daily   Check Vit D labs today      Hypercholesteremia  -Encouraged low fat and cholesterol diet  -Encouraged exercise           Follow up in about 1 year (around 10/29/2020).  Labs prior to next appointment with me

## 2019-10-29 NOTE — ASSESSMENT & PLAN NOTE
Continue Vit D supplement in her calcium and omega pills  She agrees to ensure that she is taking at least 2000 IU daily   Check Vit D labs today

## 2019-10-29 NOTE — ASSESSMENT & PLAN NOTE
-Discussed thyroid levels are at goal  -No need for thyroid medications at this time  -Check TSH on RTC

## 2019-10-29 NOTE — TELEPHONE ENCOUNTER
----- Message from Lauren Stevenson sent at 10/29/2019  2:15 PM CDT -----  Contact: Self  Pt returning missed call from clinic @ [phone]     Nayana Mast

## 2019-10-29 NOTE — LETTER
October 29, 2019      Vignesh Jones MD  [address]  [address]  [place] [address]           [facility] - Endocrinology 6th [place]  [address]  [place] [address]  Phone: [phone]  Fax: [phone]          Patient: Alicia Toussaint   MR Number: [id number]   YOB: 1950   Date of Visit: 10/29/2019       Dear Dr. Vignesh Jones:    Thank you for referring Alicia Toussaint to me for evaluation. Attached you will find relevant portions of my assessment and plan of care.    If you have questions, please do not hesitate to call me. I look forward to following Alicia Toussaint along with you.    Sincerely,    Radha Gil, QUINTON    Enclosure  CC:  No Recipients    If you would like to receive this communication electronically, please contact [email or web address] or [phone] to request more information on [employer] access.    For providers and/or their staff who would like to refer a patient to [facility], please contact us through our one-stop-shop provider referral line, [facility], at [phone].    If you feel you have received this communication in error or would no longer like to receive these types of communications, please e-mail [email or web address]

## 2019-10-29 NOTE — ASSESSMENT & PLAN NOTE
"Osteoporosis medications  These are the medications we discussed today for osteoporosis treatment:    - Bisphosphonates:         - these slow down bone loss         - oral forms (Fosamax and Actonel are examples) - taken once weekly or once monthly         - IV form (Reclast) - given intravenously once yearly    - Prolia (denosumab):          - slows down bone loss           - it is a subcutaneous injection (under the skin) every 6 months, given in the office    - Forteo (teriparatide) or Tymlos (abaloparatide):           - medications that "build bone" or increases bone formation           - subcutaneous injection (under the skin) taken once daily that you self-administer at home    For more information on medications: https://www.nof.org/patients/treatment/medicationadherence/  --Start Prolia 60 mg injection every 6 months  --Will obtain a CMP and Vit D today  --Check labs on RTC  -- Risks include low weight,  race, menopause, +FH.  -- Reviewed basics of quantity versus quality  -- Reassuring she is not fracturing   -- RDA of calcium (6862-6222 mg /day in divided doses) and vitamin D 2000iu a day  discussed  -- Calcium from food sources preferred  -- Fall precautions emphasized  -- +weight bearing exercise  -- Pharmacological therapy is recommended for patients with osteopenia if the 10 year probability of a hip fracture is >3% and 10 year probability of other major osteoporotic fractures is >20%.   -- Treatment options and potential side effects discussed for PO bisphosphonates, reclast, Denosumab, and Teriparatide.  -- Low risk for ONJ and atypical fractures reviewed and discussed. Alerted that if dental work needs to be done it should be done prior to initiating therapy   -- Repeat DEXA scan in 2 years time  "

## 2019-10-29 NOTE — TELEPHONE ENCOUNTER
Patient returned my call. We discussed her lab results -normal CMP and abnormally high Vit D at 146. We discussed her home meds and she located her bottles. She is on Centrum -1 tab daily with 1000 IU of Vit D daily AND Calcium with Vit D 5000 2 tabs daily. Discussed she is taking Vit D 11,000 IU daily, which is too much! She agrees to continue Centrum and stop Calcium with Vit D. She agrees to purchase calcium tabs 1200 mg daily without VitD. Check Vit D in 6 months with CMP prior to next Prolia injection.

## 2019-11-01 ENCOUNTER — INFUSION (OUTPATIENT)
Dept: INFECTIOUS DISEASES | Facility: HOSPITAL | Age: 69
End: 2019-11-01
Attending: INTERNAL MEDICINE
Payer: MEDICARE

## 2019-11-01 VITALS — HEIGHT: 59 IN | BODY MASS INDEX: 18.76 KG/M2 | WEIGHT: 93.06 LBS

## 2019-11-01 DIAGNOSIS — M81.0 AGE RELATED OSTEOPOROSIS, UNSPECIFIED PATHOLOGICAL FRACTURE PRESENCE: Primary | ICD-10-CM

## 2019-11-01 PROCEDURE — 96372 THER/PROPH/DIAG INJ SC/IM: CPT

## 2019-11-01 PROCEDURE — 63600175 PHARM REV CODE 636 W HCPCS: Mod: JG | Performed by: NURSE PRACTITIONER

## 2019-11-01 RX ADMIN — DENOSUMAB 60 MG: 60 INJECTION SUBCUTANEOUS at 04:11

## 2019-12-13 ENCOUNTER — TELEPHONE (OUTPATIENT)
Dept: INTERNAL MEDICINE | Facility: CLINIC | Age: 69
End: 2019-12-13

## 2019-12-13 NOTE — TELEPHONE ENCOUNTER
----- Message from Jayro Acharya sent at 12/13/2019 12:54 PM CST -----  Contact: JACQUELINE JUNE [[id number]]  Name of Who is Calling: JACQUELINE JUNE [[id number]]     What is the request in detail: JACQUELINE JUNE [[id number]] is requesting a call back in regards to new patient appointment ... Please contact to further discuss and advise      Can the clinic reply by [facility]: yes     What [facility]:  [phone] (home)

## 2019-12-31 ENCOUNTER — TELEPHONE (OUTPATIENT)
Dept: INTERNAL MEDICINE | Facility: CLINIC | Age: 69
End: 2019-12-31

## 2019-12-31 NOTE — TELEPHONE ENCOUNTER
Spoke with pt in regards to lab work. Pt was wondering since she is a new patient with Dr. Sweeney would she need to have some blood work done before her appt with him. Informed pt that Dr. Sweeney would let her know what labs he would like done on day of appt with him. Pt verbalized understanding

## 2019-12-31 NOTE — TELEPHONE ENCOUNTER
----- Message from Mikey Contreras, Patient Care Assistant sent at 12/31/2019  9:33 AM CST -----  Contact: JACQUELINE JUNE [[id number]]  Name of Who is Calling: JACQUELINE JUNE [[id number]]    What is the request in detail:Requesting a call back in regards of getting orders for blood work.  Please contact to further discuss and advise      Can the clinic reply by [facility]: No     What Number to Call Back [facility] not [facility]:   [phone]

## 2020-01-22 ENCOUNTER — OFFICE VISIT (OUTPATIENT)
Dept: INTERNAL MEDICINE | Facility: CLINIC | Age: 70
End: 2020-01-22
Attending: FAMILY MEDICINE
Payer: MEDICARE

## 2020-01-22 VITALS
OXYGEN SATURATION: 98 % | WEIGHT: 94.56 LBS | BODY MASS INDEX: 19.06 KG/M2 | SYSTOLIC BLOOD PRESSURE: 118 MMHG | HEIGHT: 59 IN | DIASTOLIC BLOOD PRESSURE: 72 MMHG | HEART RATE: 87 BPM

## 2020-01-22 DIAGNOSIS — E67.8 MULTIPLE VITAMIN EXCESS DISEASE: ICD-10-CM

## 2020-01-22 DIAGNOSIS — E67.8 EXCESSIVE VITAMIN B12 INTAKE: ICD-10-CM

## 2020-01-22 DIAGNOSIS — E78.9 DISORDER OF LIPID METABOLISM: ICD-10-CM

## 2020-01-22 DIAGNOSIS — R63.6 UNDERWEIGHT: ICD-10-CM

## 2020-01-22 DIAGNOSIS — Z86.39 PERSONAL HISTORY OF OTHER ENDOCRINE, NUTRITIONAL AND METABOLIC DISEASE: ICD-10-CM

## 2020-01-22 DIAGNOSIS — R79.9 ABNORMAL FINDING OF BLOOD CHEMISTRY, UNSPECIFIED: ICD-10-CM

## 2020-01-22 DIAGNOSIS — M81.0 OSTEOPOROSIS, POST-MENOPAUSAL: Primary | ICD-10-CM

## 2020-01-22 DIAGNOSIS — M79.671 ACUTE PAIN OF RIGHT FOOT: ICD-10-CM

## 2020-01-22 DIAGNOSIS — E55.9 VITAMIN D DEFICIENCY: ICD-10-CM

## 2020-01-22 PROCEDURE — 1101F PT FALLS ASSESS-DOCD LE1/YR: CPT | Mod: CPTII,S$GLB,, | Performed by: FAMILY MEDICINE

## 2020-01-22 PROCEDURE — 99999 PR PBB SHADOW E&M-EST. PATIENT-LVL III: CPT | Mod: PBBFAC,,, | Performed by: FAMILY MEDICINE

## 2020-01-22 PROCEDURE — 1126F AMNT PAIN NOTED NONE PRSNT: CPT | Mod: S$GLB,,, | Performed by: FAMILY MEDICINE

## 2020-01-22 PROCEDURE — 1101F PR PT FALLS ASSESS DOC 0-1 FALLS W/OUT INJ PAST YR: ICD-10-PCS | Mod: CPTII,S$GLB,, | Performed by: FAMILY MEDICINE

## 2020-01-22 PROCEDURE — 1159F PR MEDICATION LIST DOCUMENTED IN MEDICAL RECORD: ICD-10-PCS | Mod: S$GLB,,, | Performed by: FAMILY MEDICINE

## 2020-01-22 PROCEDURE — 99213 OFFICE O/P EST LOW 20 MIN: CPT | Mod: S$GLB,,, | Performed by: FAMILY MEDICINE

## 2020-01-22 PROCEDURE — 1126F PR PAIN SEVERITY QUANTIFIED, NO PAIN PRESENT: ICD-10-PCS | Mod: S$GLB,,, | Performed by: FAMILY MEDICINE

## 2020-01-22 PROCEDURE — 1159F MED LIST DOCD IN RCRD: CPT | Mod: S$GLB,,, | Performed by: FAMILY MEDICINE

## 2020-01-22 PROCEDURE — 99213 PR OFFICE/OUTPT VISIT, EST, LEVL III, 20-29 MIN: ICD-10-PCS | Mod: S$GLB,,, | Performed by: FAMILY MEDICINE

## 2020-01-22 PROCEDURE — 99999 PR PBB SHADOW E&M-EST. PATIENT-LVL III: ICD-10-PCS | Mod: PBBFAC,,, | Performed by: FAMILY MEDICINE

## 2020-01-22 NOTE — PROGRESS NOTES
"CHIEF COMPLAINT: Establish a new primary care physician    HISTORY OF PRESENT ILLNESS: The patient is a remarkably healthy 69-year-old female.  The patient has no specific complaints today.  The patient wishes to establish a new primary care physician.  The patient would also like to get some basic blood work done in a couple of months follow-up an excessively high vitamin D level.    She does have osteoporosis for which she uses Prolia injections.  She was intolerant of bisphosphonates with jaw pain.    She twisted her right foot several months ago.  She was unable to walk for almost 2 weeks.  She currently is still having pain when walking    REVIEW OF SYSTEMS:  GENERAL: No fever, chills, fatigability or weight loss.  SKIN: No rashes, itching or changes in color or texture of skin.  HEAD: No headaches or recent head trauma.  EYES: Visual acuity fine. No photophobia, ocular pain or diplopia.  EARS: Denies ear pain, discharge or vertigo.  NOSE: No loss of smell, no epistaxis or postnasal drip.  MOUTH & THROAT: No hoarseness or change in voice. No excessive gum bleeding.  NODES: Denies swollen glands.  CHEST: Denies FREITAS, cyanosis, wheezing, cough and sputum production.  CARDIOVASCULAR: Denies chest pain, PND, orthopnea or reduced exercise tolerance.  ABDOMEN: Appetite fine. No weight loss. Denies diarrhea, abdominal pain, hematemesis or blood in stool.  URINARY: No flank pain, dysuria or hematuria.  PERIPHERAL VASCULAR: No claudication or cyanosis.  MUSCULOSKELETAL: No joint stiffness or swelling. Denies back pain. Except as above  NEUROLOGIC: No history of seizures, paralysis, alteration of gait or coordination.    SOCIAL HISTORY: The patient does not smoke.  The patient consumes alcohol socially.  The patient is .    PHYSICAL EXAMINATION:   Blood pressure 118/72, pulse 87, height 4' 11" (1.499 m), weight 42.9 kg (94 lb 9.2 oz), SpO2 98 %.    APPEARANCE: Well nourished, well developed, in no acute distress.  "   HEAD: Normocephalic, atraumatic.  EYES: PERRL. EOMI.  Conjunctivae without injection and  anicteric  EARS: TM's intact. Light reflex normal. No retraction or perforation.    NOSE: Mucosa pink. Airway clear.  MOUTH & THROAT: No tonsillar enlargement. No pharyngeal erythema or exudate. No stridor.  NECK: Supple.   NODES: No cervical, axillary or inguinal lymph node enlargement.  CHEST: Lungs clear to auscultation.  No retractions are noted.  No rales or rhonchi are present.  CARDIOVASCULAR: Normal S1, S2. No rubs, murmurs or gallops.  ABDOMEN: Bowel sounds normal. Not distended. Soft. No tenderness or masses.  No ascites is noted.  MUSCULOSKELETAL:  There is no clubbing, cyanosis, or edema of the extremities x4.  There is full range of motion of the lumbar spine.  There is full range of motion of the extremities x4.  There is no deformity noted.    NEUROLOGIC:       Normal speech development.      Hearing normal.      Normal gait.      Motor and sensory exams grossly normal.  PSYCHIATRIC: Patient is alert and oriented x3.  Thought processes are all normal.  There is no homicidality.  There is no suicidality.  There is no evidence of psychosis.    LABORATORY/RADIOLOGY:   Chart reviewed.  We will update blood work.    ASSESSMENT:   Normal physical exam in an apparently healthy individual  Right midfoot sprain X 2 months    PLAN:  Follow up blood work in 2 months with phone review  Podiatry appt  Return to clinic in one year.

## 2020-01-23 ENCOUNTER — TELEPHONE (OUTPATIENT)
Dept: PODIATRY | Facility: CLINIC | Age: 70
End: 2020-01-23

## 2020-01-23 ENCOUNTER — OFFICE VISIT (OUTPATIENT)
Dept: PODIATRY | Facility: CLINIC | Age: 70
End: 2020-01-23
Payer: MEDICARE

## 2020-01-23 ENCOUNTER — APPOINTMENT (OUTPATIENT)
Dept: RADIOLOGY | Facility: OTHER | Age: 70
End: 2020-01-23
Attending: PODIATRIST
Payer: MEDICARE

## 2020-01-23 VITALS — BODY MASS INDEX: 18.95 KG/M2 | HEIGHT: 59 IN | WEIGHT: 94 LBS

## 2020-01-23 DIAGNOSIS — M79.671 FOOT PAIN, RIGHT: ICD-10-CM

## 2020-01-23 DIAGNOSIS — S96.911A STRAIN OF RIGHT FOOT, INITIAL ENCOUNTER: ICD-10-CM

## 2020-01-23 DIAGNOSIS — S96.911A STRAIN OF RIGHT FOOT, INITIAL ENCOUNTER: Primary | ICD-10-CM

## 2020-01-23 PROCEDURE — 73630 X-RAY EXAM OF FOOT: CPT | Mod: 26,RT,, | Performed by: RADIOLOGY

## 2020-01-23 PROCEDURE — 1125F PR PAIN SEVERITY QUANTIFIED, PAIN PRESENT: ICD-10-PCS | Mod: S$GLB,,, | Performed by: PODIATRIST

## 2020-01-23 PROCEDURE — 73630 XR FOOT COMPLETE 3 VIEW RIGHT: ICD-10-PCS | Mod: 26,RT,, | Performed by: RADIOLOGY

## 2020-01-23 PROCEDURE — 73630 X-RAY EXAM OF FOOT: CPT | Mod: TC,RT

## 2020-01-23 PROCEDURE — 1101F PT FALLS ASSESS-DOCD LE1/YR: CPT | Mod: CPTII,S$GLB,, | Performed by: PODIATRIST

## 2020-01-23 PROCEDURE — 99203 PR OFFICE/OUTPT VISIT, NEW, LEVL III, 30-44 MIN: ICD-10-PCS | Mod: S$GLB,,, | Performed by: PODIATRIST

## 2020-01-23 PROCEDURE — 1101F PR PT FALLS ASSESS DOC 0-1 FALLS W/OUT INJ PAST YR: ICD-10-PCS | Mod: CPTII,S$GLB,, | Performed by: PODIATRIST

## 2020-01-23 PROCEDURE — 1159F PR MEDICATION LIST DOCUMENTED IN MEDICAL RECORD: ICD-10-PCS | Mod: S$GLB,,, | Performed by: PODIATRIST

## 2020-01-23 PROCEDURE — 1125F AMNT PAIN NOTED PAIN PRSNT: CPT | Mod: S$GLB,,, | Performed by: PODIATRIST

## 2020-01-23 PROCEDURE — 1159F MED LIST DOCD IN RCRD: CPT | Mod: S$GLB,,, | Performed by: PODIATRIST

## 2020-01-23 PROCEDURE — 99203 OFFICE O/P NEW LOW 30 MIN: CPT | Mod: S$GLB,,, | Performed by: PODIATRIST

## 2020-01-23 PROCEDURE — 99999 PR PBB SHADOW E&M-EST. PATIENT-LVL III: ICD-10-PCS | Mod: PBBFAC,,, | Performed by: PODIATRIST

## 2020-01-23 PROCEDURE — 99999 PR PBB SHADOW E&M-EST. PATIENT-LVL III: CPT | Mod: PBBFAC,,, | Performed by: PODIATRIST

## 2020-01-23 RX ORDER — DICLOFENAC SODIUM 10 MG/G
2 GEL TOPICAL 4 TIMES DAILY
Qty: 100 G | Refills: 2 | Status: ON HOLD | OUTPATIENT
Start: 2020-01-23 | End: 2024-02-04 | Stop reason: HOSPADM

## 2020-01-23 NOTE — PROGRESS NOTES
Subjective:      Patient ID: Alicia Toussaint is a 69 y.o. female.    Chief Complaint: Foot Pain (Right Foot)    Sharp deep pain top right foot.  Rapid onset twisting foot on uneven brick about 6 weeks ago with minimal improvement since.  , aggravated by increased weight bearing, shoe gear, pressure.  No previous medical treatment.  OTC pain med not helping.     Review of Systems   Constitution: Negative for chills, diaphoresis, fever, malaise/fatigue and night sweats.   Cardiovascular: Negative for claudication, cyanosis, leg swelling and syncope.   Skin: Negative for color change, dry skin, nail changes, rash, suspicious lesions and unusual hair distribution.   Musculoskeletal: Positive for joint pain. Negative for falls, joint swelling, muscle cramps, muscle weakness and stiffness.   Gastrointestinal: Negative for constipation, diarrhea, nausea and vomiting.   Neurological: Negative for brief paralysis, disturbances in coordination, focal weakness, numbness, paresthesias, sensory change and tremors.           Objective:      Physical Exam   Constitutional: She is oriented to person, place, and time. She appears well-developed and well-nourished. She is cooperative. No distress.   Cardiovascular:   Pulses:       Popliteal pulses are 2+ on the right side, and 2+ on the left side.        Dorsalis pedis pulses are 2+ on the right side, and 2+ on the left side.        Posterior tibial pulses are 2+ on the right side, and 2+ on the left side.   Capillary refill 3 seconds all toes/distal feet, all toes/both feet warm to touch.      Negative lymphadenopathy bilateral popliteal fossa and tarsal tunnel.      Negavie lower extremity edema bilateral.     Musculoskeletal:        Right ankle: She exhibits normal range of motion, no swelling, no ecchymosis, no deformity, no laceration and normal pulse. Achilles tendon normal. Achilles tendon exhibits no pain, no defect and normal Urbano's test results.   Sharp pain to deep  palpation EDB origin at sinus tarsi right foot without deformity, loss of function, or signs acute trauma.    Otherwise, Normal angle, base, station of gait. All ten toes without clubbing, cyanosis, or signs of ischemia.  No pain to palpation bilateral lower extremities.  Range of motion, stability, muscle strength, and muscle tone normal bilateral feet and legs.    Lymphadenopathy: No inguinal adenopathy noted on the right or left side.   Negative lymphadenopathy bilateral popliteal fossa and tarsal tunnel.    Negative lymphangitic streaking bilateral feet/ankles/legs.   Neurological: She is alert and oriented to person, place, and time. She has normal strength. She displays no atrophy and no tremor. No sensory deficit. She exhibits normal muscle tone. Gait normal.   Reflex Scores:       Patellar reflexes are 2+ on the right side and 2+ on the left side.       Achilles reflexes are 2+ on the right side and 2+ on the left side.  Negative tinel sign to percussion sural, superficial peroneal, deep peroneal, saphenous, and posterior tibial nerves right and left ankles and feet.     Skin: Skin is warm, dry and intact. Capillary refill takes 2 to 3 seconds. No abrasion, no bruising, no burn, no ecchymosis, no laceration, no lesion and no rash noted. She is not diaphoretic. No cyanosis or erythema. No pallor. Nails show no clubbing.   Skin is normal age and health appropriate color, turgor, texture, and temperature bilateral lower extremities without ulceration, hyperpigmentation, discoloration, masses nodules or cords palpated.  No ecchymosis, erythema, edema, or cardinal signs of infection bilateral lower extremities.       Psychiatric: She has a normal mood and affect.             Assessment:       Encounter Diagnoses   Name Primary?    Strain of right foot, initial encounter Yes    Foot pain, right          Plan:       Alicia PATRICK was seen today for foot pain.    Diagnoses and all orders for this visit:    Strain of  right foot, initial encounter  -     X-Ray Foot Complete Right; Future    Foot pain, right  -     X-Ray Foot Complete Right; Future    Other orders  -     diclofenac sodium (VOLTAREN) 1 % Gel; Apply 2 g topically 4 (four) times daily.      I counseled the patient on her conditions, their implications and medical management.    Patient will stretch the tendo achilles complex three times daily as demonstrated in the office.  Literature was dispensed illustrating proper stretching technique.    Patient will obtain over the counter arch supports and wear them in shoes whenever possible.  Athletic shoes intended for walking or running are usually best.    I prescribed patient non steroidal anti inflammatory medication.  Patient will take this as directed with food, discontinue if GI upset occurs, and report to an emergency room immediately if thick black tarry stool or coffee ground appearing vomit is encountered.  These are signs of internal bleeding which can be life threatening.    Dispense fx boot right - ambulate to tolerance weaning to shoes as symptoms allow.    RIICE - protect skin from thermal damage.            Follow up in about 1 month (around 2/23/2020).

## 2020-01-23 NOTE — TELEPHONE ENCOUNTER
I informed the pt that we have not found any glasses however, we'd give her a call if they showed up. Pt voice understanding and call ended.

## 2020-01-23 NOTE — LETTER
January 23, 2020      Ranjit Sweeney MD  [address]  [facility] [address]  [facility] [address]           [facility]  [address], [facility] [address]  [facility] [address]  Phone: [phone]  Fax: [phone]          Patient: Alicia Toussaint   MR Number: [id number]   YOB: 1950   Date of Visit: 1/23/2020       Dear Dr. Ranjit Sweeney:    Thank you for referring Alicia Toussaint to me for evaluation. Attached you will find relevant portions of my assessment and plan of care.    If you have questions, please do not hesitate to call me. I look forward to following Alicia Toussaint along with you.    Sincerely,    Chandler Taylor, LES    Enclosure  CC:  No Recipients    If you would like to receive this communication electronically, please contact [email or web address] or [phone] to request more information on [employer] access.    For providers and/or their staff who would like to refer a patient to [facility], please contact us through our one-stop-shop provider referral line, [facility], at [phone].    If you feel you have received this communication in error or would no longer like to receive these types of communications, please e-mail [email or web address]

## 2020-01-23 NOTE — TELEPHONE ENCOUNTER
----- Message from Talia Kath sent at 1/23/2020  8:34 AM CST -----  Contact: JACQUELINE JUNE [[id number]]  Type: Patient Call Back    Who called:JACQUELINE JUNE [[id number]]    What is the request in detail: Patient is requesting a call back. She would like to know if she left a pair of eye glassed there this morning at her appointment.   Please contact to further advise.    Can the clinic reply by [facility]? No    Best call back number: [phone]    Additional Information: N/A

## 2020-02-11 ENCOUNTER — TELEPHONE (OUTPATIENT)
Dept: INTERNAL MEDICINE | Facility: CLINIC | Age: 70
End: 2020-02-11

## 2020-02-11 NOTE — TELEPHONE ENCOUNTER
Called to speak to Ms. Toussaint, but  states that she was working and will give her the message to call the office back.

## 2020-02-11 NOTE — TELEPHONE ENCOUNTER
----- Message from Brittnee Hutchinson sent at 2/11/2020  9:31 AM CST -----  Contact: JACQUELINE JUNE  Name of Who is Calling: JACQUELINE JUNE      What is the request in detail: Would like to speak to staff in regards to wanting a prescription for a cough and hoarseness. States she was taking Robitussin, but it's not helping and she has to work. Please advise.       Can the clinic reply by [facility]: No      What Number to Call Back if not in [facility]: [phone]; cell: [phone]

## 2020-02-12 ENCOUNTER — TELEPHONE (OUTPATIENT)
Dept: INTERNAL MEDICINE | Facility: CLINIC | Age: 70
End: 2020-02-12

## 2020-02-12 NOTE — TELEPHONE ENCOUNTER
2nd attempt to return call to Ms. Norbert, but  states that she is working and cannot come to phone .  LM to call the clinic.

## 2020-02-12 NOTE — TELEPHONE ENCOUNTER
----- Message from Domitila Contreras sent at 2/12/2020  8:37 AM CST -----  Contact: JACQUELINE JUNE [[id number]]  Type:  Patient Returning Call    Who Called: JACQUELINE JUNE [[id number]]    Who Left Message for Patient: Amara Pollard    Does the patient know what this is regarding?:Y     Best Call Back Number:[phone]    Additional Information:

## 2020-02-12 NOTE — TELEPHONE ENCOUNTER
----- Message from Jennie Simon sent at 2/12/2020  9:20 AM CST -----  Contact: JACQUELINE JUNE   Name of Who is Calling: JACQUELINE JUNE       What is the request in detail: Patient is requesting to see if a Virtussin medication can be called in for her she states she has a bad cough       Can the clinic reply by [facility]: NO      What Number to Call Back if not in [facility]: [phone]

## 2020-02-21 ENCOUNTER — OFFICE VISIT (OUTPATIENT)
Dept: PODIATRY | Facility: CLINIC | Age: 70
End: 2020-02-21
Payer: MEDICARE

## 2020-02-21 VITALS
BODY MASS INDEX: 18.99 KG/M2 | DIASTOLIC BLOOD PRESSURE: 75 MMHG | HEART RATE: 79 BPM | SYSTOLIC BLOOD PRESSURE: 137 MMHG | HEIGHT: 59 IN

## 2020-02-21 DIAGNOSIS — S96.911D STRAIN OF RIGHT FOOT, SUBSEQUENT ENCOUNTER: Primary | ICD-10-CM

## 2020-02-21 DIAGNOSIS — M79.671 FOOT PAIN, RIGHT: ICD-10-CM

## 2020-02-21 PROCEDURE — 1125F PR PAIN SEVERITY QUANTIFIED, PAIN PRESENT: ICD-10-PCS | Mod: S$GLB,,, | Performed by: PODIATRIST

## 2020-02-21 PROCEDURE — 99999 PR PBB SHADOW E&M-EST. PATIENT-LVL III: ICD-10-PCS | Mod: PBBFAC,,, | Performed by: PODIATRIST

## 2020-02-21 PROCEDURE — 1159F PR MEDICATION LIST DOCUMENTED IN MEDICAL RECORD: ICD-10-PCS | Mod: S$GLB,,, | Performed by: PODIATRIST

## 2020-02-21 PROCEDURE — 99212 PR OFFICE/OUTPT VISIT, EST, LEVL II, 10-19 MIN: ICD-10-PCS | Mod: S$GLB,,, | Performed by: PODIATRIST

## 2020-02-21 PROCEDURE — 99212 OFFICE O/P EST SF 10 MIN: CPT | Mod: S$GLB,,, | Performed by: PODIATRIST

## 2020-02-21 PROCEDURE — 1125F AMNT PAIN NOTED PAIN PRSNT: CPT | Mod: S$GLB,,, | Performed by: PODIATRIST

## 2020-02-21 PROCEDURE — 1159F MED LIST DOCD IN RCRD: CPT | Mod: S$GLB,,, | Performed by: PODIATRIST

## 2020-02-21 PROCEDURE — 1101F PR PT FALLS ASSESS DOC 0-1 FALLS W/OUT INJ PAST YR: ICD-10-PCS | Mod: CPTII,S$GLB,, | Performed by: PODIATRIST

## 2020-02-21 PROCEDURE — 99999 PR PBB SHADOW E&M-EST. PATIENT-LVL III: CPT | Mod: PBBFAC,,, | Performed by: PODIATRIST

## 2020-02-21 PROCEDURE — 1101F PT FALLS ASSESS-DOCD LE1/YR: CPT | Mod: CPTII,S$GLB,, | Performed by: PODIATRIST

## 2020-02-21 NOTE — PROGRESS NOTES
Subjective:      Patient ID: Alicia Toussaint is a 69 y.o. female.    Chief Complaint: Foot Problem (right ft.); Foot Pain; and Follow-up    Sharp deep pain top right foot.  Rapid onset twisting foot on uneven brick about 10 weeks ago with minimal improvement since.  , aggravated by increased weight bearing, shoe gear, pressure.  riice helps a little.  Not stretching, not using otc inserts, not wearing fx boot, not wearing athletic shoes (slides today), not taking nsaid/gel.  xrays negative acute trauma.     Review of Systems   Constitution: Negative for chills, diaphoresis, fever, malaise/fatigue and night sweats.   Cardiovascular: Negative for claudication, cyanosis, leg swelling and syncope.   Skin: Negative for color change, dry skin, nail changes, rash, suspicious lesions and unusual hair distribution.   Musculoskeletal: Positive for joint pain. Negative for falls, joint swelling, muscle cramps, muscle weakness and stiffness.   Gastrointestinal: Negative for constipation, diarrhea, nausea and vomiting.   Neurological: Negative for brief paralysis, disturbances in coordination, focal weakness, numbness, paresthesias, sensory change and tremors.           Objective:      Physical Exam   Constitutional: She is oriented to person, place, and time. She appears well-developed and well-nourished. She is cooperative. No distress.   Cardiovascular:   Pulses:       Popliteal pulses are 2+ on the right side, and 2+ on the left side.        Dorsalis pedis pulses are 2+ on the right side, and 2+ on the left side.        Posterior tibial pulses are 2+ on the right side, and 2+ on the left side.   Capillary refill 3 seconds all toes/distal feet, all toes/both feet warm to touch.      Negative lymphadenopathy bilateral popliteal fossa and tarsal tunnel.      Negavie lower extremity edema bilateral.     Musculoskeletal:        Right ankle: She exhibits normal range of motion, no swelling, no ecchymosis, no deformity, no laceration  and normal pulse. Achilles tendon normal. Achilles tendon exhibits no pain, no defect and normal Urbano's test results.   Minimal residual pain to deep palpation EDB origin at sinus tarsi right foot without deformity, loss of function, or signs acute trauma.    Otherwise, Normal angle, base, station of gait. All ten toes without clubbing, cyanosis, or signs of ischemia.  No pain to palpation bilateral lower extremities.  Range of motion, stability, muscle strength, and muscle tone normal bilateral feet and legs.    Lymphadenopathy: No inguinal adenopathy noted on the right or left side.   Negative lymphadenopathy bilateral popliteal fossa and tarsal tunnel.    Negative lymphangitic streaking bilateral feet/ankles/legs.   Neurological: She is alert and oriented to person, place, and time. She has normal strength. She displays no atrophy and no tremor. No sensory deficit. She exhibits normal muscle tone. Gait normal.   Reflex Scores:       Patellar reflexes are 2+ on the right side and 2+ on the left side.       Achilles reflexes are 2+ on the right side and 2+ on the left side.  Negative tinel sign to percussion sural, superficial peroneal, deep peroneal, saphenous, and posterior tibial nerves right and left ankles and feet.    Negative allodynia.   Skin: Skin is warm, dry and intact. Capillary refill takes 2 to 3 seconds. No abrasion, no bruising, no burn, no ecchymosis, no laceration, no lesion and no rash noted. She is not diaphoretic. No cyanosis or erythema. No pallor. Nails show no clubbing.   Skin is normal age and health appropriate color, turgor, texture, and temperature bilateral lower extremities without ulceration, hyperpigmentation, discoloration, masses nodules or cords palpated.  No ecchymosis, erythema, edema, or cardinal signs of infection bilateral lower extremities.       Psychiatric: She has a normal mood and affect.             Assessment:       Encounter Diagnoses   Name Primary?    Strain of  right foot, subsequent encounter Yes    Foot pain, right          Plan:       Alicia PATRICK was seen today for foot problem, foot pain and follow-up.    Diagnoses and all orders for this visit:    Strain of right foot, subsequent encounter    Foot pain, right      I counseled the patient on her conditions, their implications and medical management.    Continue (or implement) stretches, inserts/athletic shoes (fx boot if needed), otc nsaid per label prn.    Activity to tolerance.    Declines PT, custom orthotics, injection, MRI.          Follow up if symptoms worsen or fail to improve.

## 2020-04-18 ENCOUNTER — TELEPHONE (OUTPATIENT)
Dept: INTERNAL MEDICINE | Facility: CLINIC | Age: 70
End: 2020-04-18

## 2020-04-18 NOTE — TELEPHONE ENCOUNTER
----- Message from Cristina Enriquez sent at 4/17/2020  9:36 AM CDT -----  Contact: JACQUELINE JUNE [[id number]]  Who called:JACQUELINE JUNE [[id number]]    What is the request in detail: Patient is requesting a call back. She would like to know if orders can be placed for her blood work to check her vitamin D. She states it was discussed in her last appointment January, and she is just following up. She states she is supposed to have blood work done every 6 months.  Please advise.    Can the clinic reply by [facility]? No    Best call back number: [phone]     Additional Information: N/A

## 2020-04-20 ENCOUNTER — TELEPHONE (OUTPATIENT)
Dept: ENDOCRINOLOGY | Facility: CLINIC | Age: 70
End: 2020-04-20

## 2020-04-20 ENCOUNTER — LAB VISIT (OUTPATIENT)
Dept: LAB | Facility: OTHER | Age: 70
End: 2020-04-20
Attending: NURSE PRACTITIONER
Payer: MEDICARE

## 2020-04-20 DIAGNOSIS — E55.9 VITAMIN D DEFICIENCY: ICD-10-CM

## 2020-04-20 LAB — 25(OH)D3+25(OH)D2 SERPL-MCNC: 73 NG/ML (ref 30–96)

## 2020-04-20 PROCEDURE — 82306 VITAMIN D 25 HYDROXY: CPT

## 2020-04-20 PROCEDURE — 36415 COLL VENOUS BLD VENIPUNCTURE: CPT

## 2020-04-20 NOTE — TELEPHONE ENCOUNTER
Informed pt per Dr. Douglass her vitamin D is now in the normal range. She can continue the current vitamin D and calcium supplements she is taking. Pt verbalized understanding.

## 2020-05-05 ENCOUNTER — INFUSION (OUTPATIENT)
Dept: INFECTIOUS DISEASES | Facility: HOSPITAL | Age: 70
End: 2020-05-05
Attending: INTERNAL MEDICINE
Payer: MEDICARE

## 2020-05-05 VITALS
HEIGHT: 59 IN | BODY MASS INDEX: 18.94 KG/M2 | WEIGHT: 93.94 LBS | SYSTOLIC BLOOD PRESSURE: 151 MMHG | HEART RATE: 76 BPM | DIASTOLIC BLOOD PRESSURE: 85 MMHG | TEMPERATURE: 97 F

## 2020-05-05 DIAGNOSIS — M81.0 AGE RELATED OSTEOPOROSIS, UNSPECIFIED PATHOLOGICAL FRACTURE PRESENCE: Primary | ICD-10-CM

## 2020-05-05 PROCEDURE — 63600175 PHARM REV CODE 636 W HCPCS: Mod: JG | Performed by: NURSE PRACTITIONER

## 2020-05-05 PROCEDURE — 96372 THER/PROPH/DIAG INJ SC/IM: CPT

## 2020-05-05 RX ADMIN — DENOSUMAB 60 MG: 60 INJECTION SUBCUTANEOUS at 08:05

## 2020-05-05 NOTE — PROGRESS NOTES
Patient received Prolia 60 mg injection sub Q in right arm.  Tolerated well and left in NAD     Patient is on Calcium w/vit D

## 2020-05-20 ENCOUNTER — TELEPHONE (OUTPATIENT)
Dept: INTERNAL MEDICINE | Facility: CLINIC | Age: 70
End: 2020-05-20

## 2020-05-20 DIAGNOSIS — Z20.822 EXPOSURE TO COVID-19 VIRUS: Primary | ICD-10-CM

## 2020-05-20 NOTE — TELEPHONE ENCOUNTER
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        Can the clinic reply by [facility]: N      What Number to Call Back if not in [facility]: [phone]

## 2020-05-27 ENCOUNTER — LAB VISIT (OUTPATIENT)
Dept: LAB | Facility: OTHER | Age: 70
End: 2020-05-27
Attending: FAMILY MEDICINE
Payer: MEDICARE

## 2020-05-27 DIAGNOSIS — Z20.822 EXPOSURE TO COVID-19 VIRUS: ICD-10-CM

## 2020-05-27 LAB — SARS-COV-2 IGG SERPLBLD QL IA.RAPID: NEGATIVE

## 2020-05-27 PROCEDURE — 86769 SARS-COV-2 COVID-19 ANTIBODY: CPT

## 2020-05-27 PROCEDURE — 36415 COLL VENOUS BLD VENIPUNCTURE: CPT

## 2020-06-03 ENCOUNTER — TELEPHONE (OUTPATIENT)
Dept: INTERNAL MEDICINE | Facility: CLINIC | Age: 70
End: 2020-06-03

## 2020-06-03 DIAGNOSIS — Z20.822 EXPOSURE TO COVID-19 VIRUS: Primary | ICD-10-CM

## 2020-06-03 NOTE — TELEPHONE ENCOUNTER
----- Message from Aviva Tovar sent at 6/3/2020  8:17 AM CDT -----  Contact: JACQUELINE JUNE [[id number]]  Name of Who is Calling: JACQUELINE JUNE [[id number]]      What is the request in detail: pt would like covid-19 antibody testing results. Please call      Can the clinic reply by [facility]: no      What Number to Call Back if not in [facility]: [phone] ([place]) or [phone]

## 2020-08-21 ENCOUNTER — TELEPHONE (OUTPATIENT)
Dept: INTERNAL MEDICINE | Facility: CLINIC | Age: 70
End: 2020-08-21

## 2020-08-21 DIAGNOSIS — Z20.822 EXPOSURE TO COVID-19 VIRUS: ICD-10-CM

## 2020-08-21 DIAGNOSIS — E67.8 MULTIPLE VITAMIN EXCESS DISEASE: Primary | ICD-10-CM

## 2020-08-21 DIAGNOSIS — R74.8 ELEVATED VITAMIN B12 LEVEL: ICD-10-CM

## 2020-08-21 DIAGNOSIS — E55.9 VITAMIN D DEFICIENCY, UNSPECIFIED: ICD-10-CM

## 2020-08-21 DIAGNOSIS — Z13.21 ENCOUNTER FOR VITAMIN DEFICIENCY SCREENING: ICD-10-CM

## 2020-08-21 DIAGNOSIS — E67.8 EXCESSIVE VITAMIN B12 INTAKE: ICD-10-CM

## 2020-08-21 DIAGNOSIS — E55.9 VITAMIN D DEFICIENCY: ICD-10-CM

## 2020-08-21 DIAGNOSIS — Z86.39 PERSONAL HISTORY OF OTHER ENDOCRINE, NUTRITIONAL AND METABOLIC DISEASE: ICD-10-CM

## 2020-08-21 DIAGNOSIS — Z00.00 WELLNESS EXAMINATION: ICD-10-CM

## 2020-08-21 DIAGNOSIS — Z00.00 PHYSICAL EXAM: ICD-10-CM

## 2020-08-21 DIAGNOSIS — R79.9 ABNORMAL FINDING OF BLOOD CHEMISTRY, UNSPECIFIED: ICD-10-CM

## 2020-08-21 NOTE — TELEPHONE ENCOUNTER
----- Message from Talia Stockton sent at 8/21/2020  2:00 PM CDT -----  Contact: JACQUELINE JUNE [[id number]]  Type: Patient Call Back    Who called:JACQUELINE JUNE [[id number]]    What is the request in detail: Patient is requesting a call back in regards to annual lab work. She states that she us due for some blood work and she would like to know if she can get that soon.   Please advise.    Can the clinic reply by [facility]? No    Best call back number: [phone]    Additional Information: N/A

## 2020-08-31 ENCOUNTER — LAB VISIT (OUTPATIENT)
Dept: LAB | Facility: OTHER | Age: 70
End: 2020-08-31
Attending: FAMILY MEDICINE
Payer: MEDICARE

## 2020-08-31 DIAGNOSIS — E55.9 VITAMIN D DEFICIENCY: ICD-10-CM

## 2020-08-31 DIAGNOSIS — E67.8 MULTIPLE VITAMIN EXCESS DISEASE: ICD-10-CM

## 2020-08-31 DIAGNOSIS — Z00.00 WELLNESS EXAMINATION: ICD-10-CM

## 2020-08-31 DIAGNOSIS — Z20.822 EXPOSURE TO COVID-19 VIRUS: ICD-10-CM

## 2020-08-31 DIAGNOSIS — E67.8 EXCESSIVE VITAMIN B12 INTAKE: ICD-10-CM

## 2020-08-31 DIAGNOSIS — R79.9 ABNORMAL FINDING OF BLOOD CHEMISTRY, UNSPECIFIED: ICD-10-CM

## 2020-08-31 DIAGNOSIS — Z00.00 PHYSICAL EXAM: ICD-10-CM

## 2020-08-31 DIAGNOSIS — Z86.39 PERSONAL HISTORY OF OTHER ENDOCRINE, NUTRITIONAL AND METABOLIC DISEASE: ICD-10-CM

## 2020-08-31 LAB
ALBUMIN SERPL BCP-MCNC: 3.9 G/DL (ref 3.5–5.2)
ALP SERPL-CCNC: 44 U/L (ref 55–135)
ALT SERPL W/O P-5'-P-CCNC: 26 U/L (ref 10–44)
ANION GAP SERPL CALC-SCNC: 10 MMOL/L (ref 8–16)
AST SERPL-CCNC: 27 U/L (ref 10–40)
BILIRUB SERPL-MCNC: 0.5 MG/DL (ref 0.1–1)
BUN SERPL-MCNC: 20 MG/DL (ref 8–23)
CALCIUM SERPL-MCNC: 9.4 MG/DL (ref 8.7–10.5)
CHLORIDE SERPL-SCNC: 106 MMOL/L (ref 95–110)
CHOLEST SERPL-MCNC: 215 MG/DL (ref 120–199)
CHOLEST/HDLC SERPL: 3.6 {RATIO} (ref 2–5)
CO2 SERPL-SCNC: 26 MMOL/L (ref 23–29)
CREAT SERPL-MCNC: 0.8 MG/DL (ref 0.5–1.4)
EST. GFR  (AFRICAN AMERICAN): >60 ML/MIN/1.73 M^2
EST. GFR  (NON AFRICAN AMERICAN): >60 ML/MIN/1.73 M^2
ESTIMATED AVG GLUCOSE: 117 MG/DL (ref 68–131)
GLUCOSE SERPL-MCNC: 87 MG/DL (ref 70–110)
HBA1C MFR BLD HPLC: 5.7 % (ref 4–5.6)
HDLC SERPL-MCNC: 60 MG/DL (ref 40–75)
HDLC SERPL: 27.9 % (ref 20–50)
LDLC SERPL CALC-MCNC: 141.6 MG/DL (ref 63–159)
NONHDLC SERPL-MCNC: 155 MG/DL
POTASSIUM SERPL-SCNC: 4.2 MMOL/L (ref 3.5–5.1)
PROT SERPL-MCNC: 7 G/DL (ref 6–8.4)
SODIUM SERPL-SCNC: 142 MMOL/L (ref 136–145)
T4 FREE SERPL-MCNC: 0.8 NG/DL (ref 0.71–1.51)
TRIGL SERPL-MCNC: 67 MG/DL (ref 30–150)
TSH SERPL DL<=0.005 MIU/L-ACNC: 5.75 UIU/ML (ref 0.4–4)
VIT B12 SERPL-MCNC: 1989 PG/ML (ref 210–950)

## 2020-08-31 PROCEDURE — 84443 ASSAY THYROID STIM HORMONE: CPT

## 2020-08-31 PROCEDURE — 80061 LIPID PANEL: CPT

## 2020-08-31 PROCEDURE — 83036 HEMOGLOBIN GLYCOSYLATED A1C: CPT

## 2020-08-31 PROCEDURE — 80053 COMPREHEN METABOLIC PANEL: CPT

## 2020-08-31 PROCEDURE — 84439 ASSAY OF FREE THYROXINE: CPT

## 2020-08-31 PROCEDURE — 82607 VITAMIN B-12: CPT

## 2020-08-31 PROCEDURE — 36415 COLL VENOUS BLD VENIPUNCTURE: CPT

## 2020-09-11 ENCOUNTER — OFFICE VISIT (OUTPATIENT)
Dept: OBSTETRICS AND GYNECOLOGY | Facility: CLINIC | Age: 70
End: 2020-09-11
Attending: OBSTETRICS & GYNECOLOGY
Payer: MEDICARE

## 2020-09-11 VITALS
SYSTOLIC BLOOD PRESSURE: 118 MMHG | WEIGHT: 104.25 LBS | DIASTOLIC BLOOD PRESSURE: 78 MMHG | BODY MASS INDEX: 21.02 KG/M2 | HEIGHT: 59 IN

## 2020-09-11 DIAGNOSIS — Z12.4 PAP SMEAR FOR CERVICAL CANCER SCREENING: ICD-10-CM

## 2020-09-11 DIAGNOSIS — Z01.419 WELL WOMAN EXAM WITH ROUTINE GYNECOLOGICAL EXAM: Primary | ICD-10-CM

## 2020-09-11 DIAGNOSIS — Z78.0 POSTMENOPAUSAL STATUS: ICD-10-CM

## 2020-09-11 DIAGNOSIS — Z12.31 VISIT FOR SCREENING MAMMOGRAM: ICD-10-CM

## 2020-09-11 PROCEDURE — G0101 CA SCREEN;PELVIC/BREAST EXAM: HCPCS | Mod: S$GLB,,, | Performed by: OBSTETRICS & GYNECOLOGY

## 2020-09-11 PROCEDURE — G0101 PR CA SCREEN;PELVIC/BREAST EXAM: ICD-10-PCS | Mod: S$GLB,,, | Performed by: OBSTETRICS & GYNECOLOGY

## 2020-09-11 PROCEDURE — 99999 PR PBB SHADOW E&M-EST. PATIENT-LVL III: ICD-10-PCS | Mod: PBBFAC,,, | Performed by: OBSTETRICS & GYNECOLOGY

## 2020-09-11 PROCEDURE — 99999 PR PBB SHADOW E&M-EST. PATIENT-LVL III: CPT | Mod: PBBFAC,,, | Performed by: OBSTETRICS & GYNECOLOGY

## 2020-09-11 PROCEDURE — 88175 CYTOPATH C/V AUTO FLUID REDO: CPT

## 2020-09-11 PROCEDURE — 87624 HPV HI-RISK TYP POOLED RSLT: CPT

## 2020-09-11 NOTE — PROGRESS NOTES
Alicia Toussaint is a 70 y.o. year old  female who presents for routine GYN exam.  She is postmenopausal, not on hormones.  Denies bleeding, hot flashes, sweats.  She has a history of osteoporosis and is currently on Prolia.  She describes weight gain over past year (previously 89 lbs).   No gyn complaints.    Past Medical History:   Diagnosis Date    Abnormal weight loss 2017    Arthritis 2018    Complication of anesthesia     colonoscopy-cardiac arrest from anes    Elevated TSH 2017    Hypercholesteremia 2017    Osteoporosis, post-menopausal     Vitamin D deficiency 2017       Past Surgical History:   Procedure Laterality Date    COLONOSCOPY      KNEE ARTHROPLASTY  1985    TUBAL LIGATION         OB History        2    Para   2    Term   2            AB        Living   2       SAB        TAB        Ectopic        Multiple        Live Births   2                   ROS:  GENERAL: Reports weight gain.   SKIN: Denies rash or lesions.   HEAD: Denies head injury or headache.   NODES: Denies enlarged lymph nodes.   CHEST: Denies chest pain or shortness of breath.   CARDIOVASCULAR: Denies palpitations or left sided chest pain.   ABDOMEN: No abdominal pain, nausea, vomiting or rectal bleeding.   URINARY: No dysuria or hematuria.  REPRODUCTIVE: See HPI.   BREASTS: Denies pain, lumps, or nipple discharge.   HEMATOLOGIC: No easy bruisability or excessive bleeding.   MUSCULOSKELETAL: Denies joint pain.  NEUROLOGIC: Denies syncope or weakness.   PSYCHIATRIC: Denies depression.     PE:   (chaperone present during entire exam)  APPEARANCE: Well nourished, well developed, in no acute distress.  BREASTS: Symmetrical, no skin changes or visible lesions. No palpable masses, nipple discharge or adenopathy bilaterally.  ABDOMEN: Soft. No tenderness or masses. No hernias. No CVA tenderness.  VULVA: Atrophic. No lesions. Normal female genitalia.  URETHRAL MEATUS: Normal size and  location, no lesions, no prolapse.  URETHRA: No masses, tenderness, prolapse or scarring.  VAGINA: Atrophic.  No lesions, no abnormal discharge, no significant cystocele or rectocele.  CERVIX: No lesions and discharge. Stenotic.  UTERUS: Normal size, regular shape, mobile, non-tender, bladder base nontender.  ADNEXA: No masses, tenderness or CDS nodularity.  ANUS PERINEUM: Normal.    Diagnosis:  1. Well woman exam with routine gynecological exam    2. Postmenopausal status    3. Pap smear for cervical cancer screening    4. Visit for screening mammogram          PLAN:    Orders Placed This Encounter    HPV High Risk Genotypes, PCR    Mammo Digital Screening Bilat w/ Billy    Liquid-Based Pap Smear, Screening       Patient was counseled today on postmenopausal issues.  We discussed her osteoporosis for which she is doing well on Prolia and plans to continue follow-up with endocrinology.    Follow-up in 1 year.

## 2020-09-15 ENCOUNTER — TELEPHONE (OUTPATIENT)
Dept: INTERNAL MEDICINE | Facility: CLINIC | Age: 70
End: 2020-09-15

## 2020-09-15 DIAGNOSIS — Z03.818 ENCOUNTER FOR OBSERVATION FOR SUSPECTED EXPOSURE TO OTHER BIOLOGICAL AGENTS RULED OUT: ICD-10-CM

## 2020-09-15 DIAGNOSIS — U07.1 COVID-19: Primary | ICD-10-CM

## 2020-09-15 NOTE — TELEPHONE ENCOUNTER
----- Message from Juana Hess sent at 9/15/2020  4:03 PM CDT -----  Name of Who is Calling: JACQUELINE JUNE [[id number]]      What is the request in detail: Pt states she needs an order for a Covid 19 placed in the system. Please contact to further discuss and advise.        Can the clinic reply by [facility]: N      What [facility]:  [phone]

## 2020-09-15 NOTE — TELEPHONE ENCOUNTER
Pt stated she was around a friend last week who tested positive for covid. Requesting covid screening

## 2020-09-16 ENCOUNTER — HOSPITAL ENCOUNTER (OUTPATIENT)
Dept: PREADMISSION TESTING | Facility: OTHER | Age: 70
Discharge: HOME | End: 2020-09-16
Attending: FAMILY MEDICINE
Payer: MEDICARE

## 2020-09-16 DIAGNOSIS — U07.1 COVID-19: ICD-10-CM

## 2020-09-16 DIAGNOSIS — Z03.818 ENCOUNTER FOR OBSERVATION FOR SUSPECTED EXPOSURE TO OTHER BIOLOGICAL AGENTS RULED OUT: ICD-10-CM

## 2020-09-16 PROCEDURE — U0003 INFECTIOUS AGENT DETECTION BY NUCLEIC ACID (DNA OR RNA); SEVERE ACUTE RESPIRATORY SYNDROME CORONAVIRUS 2 (SARS-COV-2) (CORONAVIRUS DISEASE [COVID-19]), AMPLIFIED PROBE TECHNIQUE, MAKING USE OF HIGH THROUGHPUT TECHNOLOGIES AS DESCRIBED BY CMS-2020-01-R: HCPCS

## 2020-09-17 LAB — SARS-COV-2 RNA RESP QL NAA+PROBE: NOT DETECTED

## 2020-09-18 LAB
HPV HR 12 DNA SPEC QL NAA+PROBE: NEGATIVE
HPV16 AG SPEC QL: NEGATIVE
HPV18 DNA SPEC QL NAA+PROBE: NEGATIVE

## 2020-09-26 LAB
FINAL PATHOLOGIC DIAGNOSIS: NORMAL
Lab: NORMAL

## 2020-09-28 ENCOUNTER — TELEPHONE (OUTPATIENT)
Dept: INFECTIOUS DISEASES | Facility: HOSPITAL | Age: 70
End: 2020-09-28

## 2020-09-28 NOTE — TELEPHONE ENCOUNTER
No answer from patient and I couldn't leave a message changed appt from Monday to Tuesday in november

## 2020-09-28 NOTE — TELEPHONE ENCOUNTER
----- Message from Sharon Jacobsen sent at 9/28/2020  1:32 PM CDT -----  Pt is requesting a call back, she needs to reschedule her injection scheduled in November. Pt is heading to work, she needs to reschedule from Monday to Tuesday morning. Please call back.    Contact Info [phone] (home)

## 2020-10-19 ENCOUNTER — HOSPITAL ENCOUNTER (OUTPATIENT)
Dept: RADIOLOGY | Facility: OTHER | Age: 70
Discharge: HOME | End: 2020-10-19
Attending: OBSTETRICS & GYNECOLOGY
Payer: MEDICARE

## 2020-10-19 DIAGNOSIS — Z12.31 VISIT FOR SCREENING MAMMOGRAM: ICD-10-CM

## 2020-10-19 PROCEDURE — 77063 BREAST TOMOSYNTHESIS BI: CPT | Mod: 26,,, | Performed by: INTERNAL MEDICINE

## 2020-10-19 PROCEDURE — 77067 SCR MAMMO BI INCL CAD: CPT | Mod: 26,,, | Performed by: INTERNAL MEDICINE

## 2020-10-19 PROCEDURE — 77063 MAMMO DIGITAL SCREENING BILAT WITH TOMO: ICD-10-PCS | Mod: 26,,, | Performed by: INTERNAL MEDICINE

## 2020-10-19 PROCEDURE — 77067 SCR MAMMO BI INCL CAD: CPT | Mod: TC

## 2020-10-19 PROCEDURE — 77067 MAMMO DIGITAL SCREENING BILAT WITH TOMO: ICD-10-PCS | Mod: 26,,, | Performed by: INTERNAL MEDICINE

## 2020-11-06 NOTE — PROGRESS NOTES
Subjective:      Patient ID: Alicia Toussaint is a 70 y.o. female.    Chief Complaint:  Osteoporosis    History of Present Illness  Alicia Toussaint presents today for follow up of osteoporosis.  Last seen in Oct 2019 by NICANOR Gil NP. This is her first visit with me.     With regards to osteoporosis:      Was on Fosamax but stopped around 2017. States that she was on since around 2014. States that she stopped due to indigestion and nausea.     Prolia started in Nov 2019 and last injection May 2020    Calcium intake- 1250 mg daily  Vit D intake-  With calcium and fish oil and zinc  Weight bearing exercise-Walking 5 days weekly for one hour. No weight bearing exercise. She is doing pilates once weekly.  Recent falls- No     No recent fractures   No significant height loss (>2 inches)  No kidney stones    tob use -  None     etoh use- none    Family history: sister with osteopenia.   No current diarrhea or h/o malabsorption  Menopause around age 50     Denies chronic exposure to steroid therapy, anticoagulants, proton pump inhibitors or antiseizure medications.   Denies GERD, indigestion, or gastric bypass surgery.   Denies history of thyroid disease, anemia, kidney stones or kidney disease.     Denies active malignancy, history of malignancy the involved the bone, prior radiation treatment, or unexplained elevations of alk phos on labs     BMD 10/13/17  A quantitative bone mineral density was obtained using the DEXA technique.  The bone mineral density measured from L1 through L4 is 0.849g/cm2.  This corresponds to a T score of -2.8 and a Z score of -0.4. This is 3.5% higher compared to prior.  The bone mineral density within the left femoral neck measures 0.868 g/cm2.  This corresponds to a T score of    -1.2 and a Z score of 0.7.  The bone mineral density within the right femoral neck measures 0.863 g/cm2.  This corresponds to a T score of    -0.3 and a Z score of 0.7.  Mean femur neck density is -2.4% compared to  prior.  The FRAX score (10 year probability of fracture) is 4.1% for major osteoporotic fracture and 0.4% for hip fracture.    Last BMD 10/16/19  COMPARISON:  October 2017    FINDINGS:  The bone mineral density measured from L1 through L4 is 0.817 g/cm2.  This corresponds to a T score of -3.1 and a Z score of -0.6.  The overall density of the spine is 3.8% lower compared to prior.    The bone mineral density within the left femoral neck measures 0.860 g/cm2.  This corresponds to a T score of -1.3 and a Z score of 0.8.    The bone mineral density within the right femoral neck measures 0.847 g/cm2.  This corresponds to a T score of  -1.4 and a Z score of 0.7.    Total mean femur neck density is 3% lower compared to prior.    The FRAX score (10 year probability of fracture) is 4.2 % for a major osteoporotic fracture and 0.5 % for hip fracture.         With regards to abnormal TFT's,     Family history of thyroid disease in her sister    current symptoms:   Reports weight loss -states weight fluctuates  Denies Fatigue   Denies Constipation or frequent BM   Denies Hair loss  Denies any skin changes   Denies Brittle nails  Denies Mental fog   No heat or cold intolerance.   No tremor   Denies anxiety  Denies cp, palpitations, or sob     Lab Results   Component Value Date    TSH 5.749 (H) 08/31/2020     With regards to prediabetes,     She denies family history of diabetes. She loves ice cream. She is exercising as above     Ref. Range 8/31/2020 07:45   Hemoglobin A1C External Latest Ref Range: 4.0 - 5.6 % 5.7 (H)       Review of Systems   Constitutional: Negative for fatigue.   Eyes: Negative for visual disturbance.   Respiratory: Negative for shortness of breath.    Cardiovascular: Negative for chest pain.   Gastrointestinal: Negative for abdominal pain.   Musculoskeletal: Negative for arthralgias and gait problem.   Skin: Negative for wound.   Neurological: Negative for headaches.   Hematological: Does not bruise/bleed  "easily.   Psychiatric/Behavioral: Negative for sleep disturbance.     Objective:   Physical Exam  Vitals signs reviewed.   Constitutional:       Appearance: She is well-developed.   Neck:      Thyroid: No thyromegaly.   Cardiovascular:      Rate and Rhythm: Normal rate.   Pulmonary:      Effort: Pulmonary effort is normal.   Abdominal:      Palpations: Abdomen is soft.   Musculoskeletal:      Comments: No point tenderness to spine       Visit Vitals  /76   Ht 4' 11" (1.499 m)   Wt 47.7 kg (105 lb 0.8 oz)   BMI 21.22 kg/m²        Lab Review:   Lab Results   Component Value Date    HGBA1C 5.7 (H) 08/31/2020    HGBA1C 5.6 04/19/2019      Lab Results   Component Value Date    CHOL 215 (H) 08/31/2020    HDL 60 08/31/2020    LDLCALC 141.6 08/31/2020    TRIG 67 08/31/2020    CHOLHDL 27.9 08/31/2020     Lab Results   Component Value Date     08/31/2020    K 4.2 08/31/2020     08/31/2020    CO2 26 08/31/2020    GLU 87 08/31/2020    BUN 20 08/31/2020    CREATININE 0.8 08/31/2020    CALCIUM 9.4 08/31/2020    PROT 7.0 08/31/2020    ALBUMIN 3.9 08/31/2020    BILITOT 0.5 08/31/2020    ALKPHOS 44 (L) 08/31/2020    AST 27 08/31/2020    ALT 26 08/31/2020    ANIONGAP 10 08/31/2020    ESTGFRAFRICA >60 08/31/2020    EGFRNONAA >60 08/31/2020    TSH 5.749 (H) 08/31/2020        Ref. Range 5/5/2015 10:44 5/3/2016 11:25 5/2/2017 08:07 4/3/2018 07:21 4/19/2019 08:38   Vitamin D, 1,25 (OH)2 Latest Ref Range: 18 - 72 pg/mL    37    Vitamin D, 25-OH, D2 Latest Ref Range: See Below ng/mL  <4 <4     Vitamin D, 25-OH, D3 Latest Ref Range: See Below ng/mL  56 46     Vitamin D, 25-OH, Total Latest Ref Range: 30 - 100 ng/mL 49 56 46  >150 (H)   Vitamin D2, 1,25 (OH)2 Latest Units: pg/mL    <8    Vitamin D3, 1,25 (OH)2 Latest Units: pg/mL    37      Assessment and Plan     1. Osteoporosis, post-menopausal  Comprehensive Metabolic Panel    Vitamin D    TSH    DXA Bone Density Spine And Hip   2. Elevated TSH  TSH   3. Vitamin D " deficiency  Vitamin D   4. Hypercholesteremia     5. Prediabetes  Hemoglobin A1C     Osteoporosis, post-menopausal  --Continue Prolia 60 mg injection every 6 months  --Check labs on RTC  -- Continue calcium  -- Stop Vitamin d with zinc pill   -- Risks include low weight,  race, menopause, +FH.  -- Reviewed basics of quantity versus quality  -- Reassuring she is not fracturing   -- RDA of calcium (5413-3189 mg /day in divided doses) and vitamin D 2000iu a day  discussed  -- Calcium from food sources preferred  -- Fall precautions emphasized  -- +weight bearing exercise  -- Pharmacological therapy is recommended for patients with osteopenia if the 10 year probability of a hip fracture is >3% and 10 year probability of other major osteoporotic fractures is >20%.   -- Treatment options and potential side effects discussed for PO bisphosphonates, reclast, Denosumab, and Teriparatide.  -- Low risk for ONJ and atypical fractures reviewed and discussed. Alerted that if dental work needs to be done it should be done prior to initiating therapy   -- Repeat DEXA scan in Oct 2021    Elevated TSH  -Discussed thyroid levels are at goal without symptoms  -No need for thyroid medications at this time  -Check TSH on RTC       Vitamin D deficiency  -- Stop Vitamin d with zinc pill  -- Continue Vit D with MVI      Hypercholesteremia  -Encouraged low fat and cholesterol diet  -Encouraged exercise   -Given information on foods that contribute to high cholesterol           Prediabetes  -- Discussed lifestyle changes  -- Given information on low carb diet     Follow up in about 1 year (around 11/9/2021).  Labs prior to next appointment with me

## 2020-11-09 ENCOUNTER — OFFICE VISIT (OUTPATIENT)
Dept: ENDOCRINOLOGY | Facility: CLINIC | Age: 70
End: 2020-11-09
Payer: MEDICARE

## 2020-11-09 ENCOUNTER — INFUSION (OUTPATIENT)
Dept: INFECTIOUS DISEASES | Facility: HOSPITAL | Age: 70
End: 2020-11-09
Attending: FAMILY MEDICINE
Payer: MEDICARE

## 2020-11-09 VITALS
WEIGHT: 105.06 LBS | SYSTOLIC BLOOD PRESSURE: 123 MMHG | DIASTOLIC BLOOD PRESSURE: 76 MMHG | HEART RATE: 69 BPM | BODY MASS INDEX: 21.18 KG/M2 | DIASTOLIC BLOOD PRESSURE: 76 MMHG | HEIGHT: 59 IN | SYSTOLIC BLOOD PRESSURE: 123 MMHG

## 2020-11-09 DIAGNOSIS — M81.0 OSTEOPOROSIS, POST-MENOPAUSAL: ICD-10-CM

## 2020-11-09 DIAGNOSIS — R73.03 PREDIABETES: ICD-10-CM

## 2020-11-09 DIAGNOSIS — E78.00 HYPERCHOLESTEREMIA: ICD-10-CM

## 2020-11-09 DIAGNOSIS — M81.0 AGE RELATED OSTEOPOROSIS, UNSPECIFIED PATHOLOGICAL FRACTURE PRESENCE: Primary | ICD-10-CM

## 2020-11-09 DIAGNOSIS — R79.89 ELEVATED TSH: ICD-10-CM

## 2020-11-09 DIAGNOSIS — E55.9 VITAMIN D DEFICIENCY: ICD-10-CM

## 2020-11-09 PROCEDURE — 63600175 PHARM REV CODE 636 W HCPCS: Mod: JG | Performed by: NURSE PRACTITIONER

## 2020-11-09 PROCEDURE — 3008F BODY MASS INDEX DOCD: CPT | Mod: CPTII,S$GLB,, | Performed by: NURSE PRACTITIONER

## 2020-11-09 PROCEDURE — 99999 PR PBB SHADOW E&M-EST. PATIENT-LVL III: ICD-10-PCS | Mod: PBBFAC,,, | Performed by: NURSE PRACTITIONER

## 2020-11-09 PROCEDURE — 96372 THER/PROPH/DIAG INJ SC/IM: CPT

## 2020-11-09 PROCEDURE — 1159F MED LIST DOCD IN RCRD: CPT | Mod: S$GLB,,, | Performed by: NURSE PRACTITIONER

## 2020-11-09 PROCEDURE — 1126F AMNT PAIN NOTED NONE PRSNT: CPT | Mod: S$GLB,,, | Performed by: NURSE PRACTITIONER

## 2020-11-09 PROCEDURE — 1126F PR PAIN SEVERITY QUANTIFIED, NO PAIN PRESENT: ICD-10-PCS | Mod: S$GLB,,, | Performed by: NURSE PRACTITIONER

## 2020-11-09 PROCEDURE — 1101F PR PT FALLS ASSESS DOC 0-1 FALLS W/OUT INJ PAST YR: ICD-10-PCS | Mod: CPTII,S$GLB,, | Performed by: NURSE PRACTITIONER

## 2020-11-09 PROCEDURE — 1159F PR MEDICATION LIST DOCUMENTED IN MEDICAL RECORD: ICD-10-PCS | Mod: S$GLB,,, | Performed by: NURSE PRACTITIONER

## 2020-11-09 PROCEDURE — 99999 PR PBB SHADOW E&M-EST. PATIENT-LVL III: CPT | Mod: PBBFAC,,, | Performed by: NURSE PRACTITIONER

## 2020-11-09 PROCEDURE — 99214 PR OFFICE/OUTPT VISIT, EST, LEVL IV, 30-39 MIN: ICD-10-PCS | Mod: S$GLB,,, | Performed by: NURSE PRACTITIONER

## 2020-11-09 PROCEDURE — 99214 OFFICE O/P EST MOD 30 MIN: CPT | Mod: S$GLB,,, | Performed by: NURSE PRACTITIONER

## 2020-11-09 PROCEDURE — 3008F PR BODY MASS INDEX (BMI) DOCUMENTED: ICD-10-PCS | Mod: CPTII,S$GLB,, | Performed by: NURSE PRACTITIONER

## 2020-11-09 PROCEDURE — 1101F PT FALLS ASSESS-DOCD LE1/YR: CPT | Mod: CPTII,S$GLB,, | Performed by: NURSE PRACTITIONER

## 2020-11-09 RX ADMIN — DENOSUMAB 60 MG: 60 INJECTION SUBCUTANEOUS at 08:11

## 2020-11-09 NOTE — ASSESSMENT & PLAN NOTE
--Continue Prolia 60 mg injection every 6 months  --Check labs on RTC  -- Continue calcium  -- Stop Vitamin d with zinc pill   -- Risks include low weight,  race, menopause, +FH.  -- Reviewed basics of quantity versus quality  -- Reassuring she is not fracturing   -- RDA of calcium (0481-5558 mg /day in divided doses) and vitamin D 2000iu a day  discussed  -- Calcium from food sources preferred  -- Fall precautions emphasized  -- +weight bearing exercise  -- Pharmacological therapy is recommended for patients with osteopenia if the 10 year probability of a hip fracture is >3% and 10 year probability of other major osteoporotic fractures is >20%.   -- Treatment options and potential side effects discussed for PO bisphosphonates, reclast, Denosumab, and Teriparatide.  -- Low risk for ONJ and atypical fractures reviewed and discussed. Alerted that if dental work needs to be done it should be done prior to initiating therapy   -- Repeat DEXA scan in Oct 2021

## 2020-11-09 NOTE — PATIENT INSTRUCTIONS
Osteoporosis   Continue Prolia    Check labs when you come back   Check bone density in Oct 2021   Continue calcium   Stop Vitamin d with zinc pill     Thyroid    Your thyroid level is slightly elevated. We will watch it.     Pre-diabetes   Snacks can be an important part of a balanced, healthy meal plan. They allow you to eat more frequently, feeling full and satisfied throughout the day. Also, they allow you to spread carbohydrates evenly, which may stabilize blood sugars.  Plus, snacks are enjoyable!     The amount of carbohydrate needed at snacks varies. Generally, about 15-30 grams of carbohydrate per snack is recommended.  Below you will find some tasty treats.       0-5 gm carb   Crystal Light   Vitamin Water Zero   Herbal tea, unsweetened   2 tsp peanut butter on celery   1./2 cup sugar-free jell-o   1 sugar-free popsicle   ¼ cup blueberries   8oz Blue Sandra unsweetened almond milk   5 baby carrots & celery sticks, cucumbers, bell peppers dipped in ¼ cup salsa, 2Tbsp light ranch dressing or 2Tbsp plain Greek yogurt   10 Goldfish crackers   ½ oz low-fat cheese or string cheese   1 closed handful of nuts, unsalted   1 Tbsp of sunflower seeds, unsalted   1 cup Smart Pop popcorn   1 whole grain brown rice cake        15 gm carb   1 small piece of fruit or ½ banana or 1/2 cup lite canned fruit   3 parviz cracker squares   3 cups Smart Pop popcorn, top spray butter, Guaman lite salt or cinnamon and Truvia   5 Vanilla Wafers   ½ cup low fat, no added sugar ice cream or frozen yogurt (Blue bell, Blue Bunny, Weight Watchers, Skinny Cow)   ½ turkey, ham, or chicken sandwich   ½ c fruit with ½ c Cottage cheese   4-6 unsalted wheat crackers with 1 oz low fat cheese or 1 tbsp peanut butter    30-45 goldfish crackers (depending on flavor)    7-8 Worship mini brown rice cakes (caramel, apple cinnamon, chocolate)    12 Worship mini brown rice cakes (cheddar, bbq, ranch)    1/3 cup hummus dip with  "raw veg   1/2 whole wheat gricelda, 1Tbsp hummus   Mini Pizza (1/2 whole wheat English muffin, low-fat  cheese, tomato sauce)   100 calorie snack pack (Oreo, Chips Ahoy, Ritz Mix, Baked Cheetos)   4-6 oz. light or Greek Style yogurt (Chobani, Yoplait, Okios, Stoneyfield)   ½ cup sugar-free pudding     6 in. wheat tortilla or gricelda oven toasted chips (topped with spray butter flavoring, cinnamon, Truvia OR spray butter, garlic powder, chili powder)    18 BBQ Popchips (available at [employer], [employer] Foods, Fresh Market)     High cholesterol    High cholesterol foods to avoid/limit:     C: Cheese, milk, dairy  A: Animal Fats: hot dogs and hamburgers  G: "Getting it away from home": going out to eat a lot  E: Extra Fat: cookies, candy, and sweets  F: Family History    Remember high cholesterol can clog your arteries and increase risk for heart attack or stroke.     "

## 2020-11-09 NOTE — PROGRESS NOTES
Patient received Prolia 60 mg injection sub Q in right arm.  Tolerated well and left in NAD    Patient is taking Vit D

## 2020-11-09 NOTE — ASSESSMENT & PLAN NOTE
-Discussed thyroid levels are at goal without symptoms  -No need for thyroid medications at this time  -Check TSH on RTC

## 2020-11-09 NOTE — ASSESSMENT & PLAN NOTE
-Encouraged low fat and cholesterol diet  -Encouraged exercise   -Given information on foods that contribute to high cholesterol

## 2021-02-15 ENCOUNTER — TELEPHONE (OUTPATIENT)
Dept: INTERNAL MEDICINE | Facility: CLINIC | Age: 71
End: 2021-02-15

## 2021-02-15 DIAGNOSIS — E78.00 HYPERCHOLESTEREMIA: ICD-10-CM

## 2021-02-15 DIAGNOSIS — R63.4 ABNORMAL WEIGHT LOSS: ICD-10-CM

## 2021-02-15 DIAGNOSIS — R79.89 ELEVATED TSH: ICD-10-CM

## 2021-02-15 DIAGNOSIS — Z00.00 ANNUAL PHYSICAL EXAM: Primary | ICD-10-CM

## 2021-02-15 DIAGNOSIS — M81.0 OSTEOPOROSIS, POST-MENOPAUSAL: ICD-10-CM

## 2021-02-18 ENCOUNTER — TELEPHONE (OUTPATIENT)
Dept: INTERNAL MEDICINE | Facility: CLINIC | Age: 71
End: 2021-02-18

## 2021-02-18 ENCOUNTER — IMMUNIZATION (OUTPATIENT)
Dept: INTERNAL MEDICINE | Facility: CLINIC | Age: 71
End: 2021-02-18
Payer: MEDICARE

## 2021-02-18 DIAGNOSIS — Z23 NEED FOR VACCINATION: Primary | ICD-10-CM

## 2021-02-18 PROCEDURE — 91300 COVID-19, MRNA, LNP-S, PF, 30 MCG/0.3 ML DOSE VACCINE: CPT | Mod: PBBFAC | Performed by: INTERNAL MEDICINE

## 2021-03-10 ENCOUNTER — TELEPHONE (OUTPATIENT)
Dept: INTERNAL MEDICINE | Facility: CLINIC | Age: 71
End: 2021-03-10

## 2021-03-11 ENCOUNTER — IMMUNIZATION (OUTPATIENT)
Dept: INTERNAL MEDICINE | Facility: CLINIC | Age: 71
End: 2021-03-11

## 2021-03-11 DIAGNOSIS — Z23 NEED FOR VACCINATION: Primary | ICD-10-CM

## 2021-03-11 PROCEDURE — 0002A COVID-19, MRNA, LNP-S, PF, 30 MCG/0.3 ML DOSE VACCINE: ICD-10-PCS | Mod: CV19,S$GLB,, | Performed by: INTERNAL MEDICINE

## 2021-03-11 PROCEDURE — 0002A COVID-19, MRNA, LNP-S, PF, 30 MCG/0.3 ML DOSE VACCINE: CPT | Mod: CV19,S$GLB,, | Performed by: INTERNAL MEDICINE

## 2021-03-11 PROCEDURE — 91300 COVID-19, MRNA, LNP-S, PF, 30 MCG/0.3 ML DOSE VACCINE: CPT | Mod: S$GLB,,, | Performed by: INTERNAL MEDICINE

## 2021-03-11 PROCEDURE — 91300 COVID-19, MRNA, LNP-S, PF, 30 MCG/0.3 ML DOSE VACCINE: ICD-10-PCS | Mod: S$GLB,,, | Performed by: INTERNAL MEDICINE

## 2021-03-16 ENCOUNTER — LAB VISIT (OUTPATIENT)
Dept: LAB | Facility: OTHER | Age: 71
End: 2021-03-16
Attending: FAMILY MEDICINE
Payer: MEDICARE

## 2021-03-16 DIAGNOSIS — E78.00 HYPERCHOLESTEREMIA: ICD-10-CM

## 2021-03-16 DIAGNOSIS — M81.0 OSTEOPOROSIS, POST-MENOPAUSAL: ICD-10-CM

## 2021-03-16 DIAGNOSIS — R63.4 ABNORMAL WEIGHT LOSS: ICD-10-CM

## 2021-03-16 DIAGNOSIS — R79.89 ELEVATED TSH: ICD-10-CM

## 2021-03-16 DIAGNOSIS — Z00.00 ANNUAL PHYSICAL EXAM: ICD-10-CM

## 2021-03-16 LAB
ALBUMIN SERPL BCP-MCNC: 3.8 G/DL (ref 3.5–5.2)
ALP SERPL-CCNC: 44 U/L (ref 55–135)
ALT SERPL W/O P-5'-P-CCNC: 22 U/L (ref 10–44)
ANION GAP SERPL CALC-SCNC: 9 MMOL/L (ref 8–16)
AST SERPL-CCNC: 25 U/L (ref 10–40)
BASOPHILS # BLD AUTO: 0.04 K/UL (ref 0–0.2)
BASOPHILS NFR BLD: 0.6 % (ref 0–1.9)
BILIRUB SERPL-MCNC: 0.6 MG/DL (ref 0.1–1)
BUN SERPL-MCNC: 16 MG/DL (ref 8–23)
CALCIUM SERPL-MCNC: 9.2 MG/DL (ref 8.7–10.5)
CHLORIDE SERPL-SCNC: 106 MMOL/L (ref 95–110)
CO2 SERPL-SCNC: 25 MMOL/L (ref 23–29)
CREAT SERPL-MCNC: 0.7 MG/DL (ref 0.5–1.4)
DIFFERENTIAL METHOD: ABNORMAL
EOSINOPHIL # BLD AUTO: 0.3 K/UL (ref 0–0.5)
EOSINOPHIL NFR BLD: 3.9 % (ref 0–8)
ERYTHROCYTE [DISTWIDTH] IN BLOOD BY AUTOMATED COUNT: 14 % (ref 11.5–14.5)
EST. GFR  (AFRICAN AMERICAN): >60 ML/MIN/1.73 M^2
EST. GFR  (NON AFRICAN AMERICAN): >60 ML/MIN/1.73 M^2
GLUCOSE SERPL-MCNC: 81 MG/DL (ref 70–110)
HCT VFR BLD AUTO: 42.9 % (ref 37–48.5)
HGB BLD-MCNC: 13.8 G/DL (ref 12–16)
IMM GRANULOCYTES # BLD AUTO: 0.02 K/UL (ref 0–0.04)
IMM GRANULOCYTES NFR BLD AUTO: 0.3 % (ref 0–0.5)
LYMPHOCYTES # BLD AUTO: 3.5 K/UL (ref 1–4.8)
LYMPHOCYTES NFR BLD: 47.9 % (ref 18–48)
MCH RBC QN AUTO: 28.6 PG (ref 27–31)
MCHC RBC AUTO-ENTMCNC: 32.2 G/DL (ref 32–36)
MCV RBC AUTO: 89 FL (ref 82–98)
MONOCYTES # BLD AUTO: 0.7 K/UL (ref 0.3–1)
MONOCYTES NFR BLD: 9.4 % (ref 4–15)
NEUTROPHILS # BLD AUTO: 2.8 K/UL (ref 1.8–7.7)
NEUTROPHILS NFR BLD: 37.9 % (ref 38–73)
NRBC BLD-RTO: 0 /100 WBC
PLATELET # BLD AUTO: 221 K/UL (ref 150–350)
PMV BLD AUTO: 10.7 FL (ref 9.2–12.9)
POTASSIUM SERPL-SCNC: 4.1 MMOL/L (ref 3.5–5.1)
PROT SERPL-MCNC: 7.2 G/DL (ref 6–8.4)
RBC # BLD AUTO: 4.82 M/UL (ref 4–5.4)
SODIUM SERPL-SCNC: 140 MMOL/L (ref 136–145)
TSH SERPL DL<=0.005 MIU/L-ACNC: 3.96 UIU/ML (ref 0.4–4)
WBC # BLD AUTO: 7.26 K/UL (ref 3.9–12.7)

## 2021-03-16 PROCEDURE — 85025 COMPLETE CBC W/AUTO DIFF WBC: CPT | Performed by: FAMILY MEDICINE

## 2021-03-16 PROCEDURE — 36415 COLL VENOUS BLD VENIPUNCTURE: CPT | Performed by: FAMILY MEDICINE

## 2021-03-16 PROCEDURE — 84443 ASSAY THYROID STIM HORMONE: CPT | Performed by: FAMILY MEDICINE

## 2021-03-16 PROCEDURE — 80061 LIPID PANEL: CPT | Performed by: FAMILY MEDICINE

## 2021-03-16 PROCEDURE — 83036 HEMOGLOBIN GLYCOSYLATED A1C: CPT | Performed by: FAMILY MEDICINE

## 2021-03-16 PROCEDURE — 80053 COMPREHEN METABOLIC PANEL: CPT | Performed by: FAMILY MEDICINE

## 2021-03-17 LAB
CHOLEST SERPL-MCNC: 202 MG/DL (ref 120–199)
CHOLEST/HDLC SERPL: 3.4 {RATIO} (ref 2–5)
ESTIMATED AVG GLUCOSE: 111 MG/DL (ref 68–131)
HBA1C MFR BLD: 5.5 % (ref 4–5.6)
HDLC SERPL-MCNC: 60 MG/DL (ref 40–75)
HDLC SERPL: 29.7 % (ref 20–50)
LDLC SERPL CALC-MCNC: 128.4 MG/DL (ref 63–159)
NONHDLC SERPL-MCNC: 142 MG/DL
TRIGL SERPL-MCNC: 68 MG/DL (ref 30–150)

## 2021-03-22 ENCOUNTER — PATIENT OUTREACH (OUTPATIENT)
Dept: ADMINISTRATIVE | Facility: HOSPITAL | Age: 71
End: 2021-03-22

## 2021-04-05 ENCOUNTER — OFFICE VISIT (OUTPATIENT)
Dept: INTERNAL MEDICINE | Facility: CLINIC | Age: 71
End: 2021-04-05
Attending: FAMILY MEDICINE
Payer: MEDICARE

## 2021-04-05 ENCOUNTER — LAB VISIT (OUTPATIENT)
Dept: LAB | Facility: OTHER | Age: 71
End: 2021-04-05
Attending: FAMILY MEDICINE
Payer: MEDICARE

## 2021-04-05 VITALS
HEIGHT: 59 IN | SYSTOLIC BLOOD PRESSURE: 118 MMHG | OXYGEN SATURATION: 100 % | WEIGHT: 104.94 LBS | DIASTOLIC BLOOD PRESSURE: 74 MMHG | BODY MASS INDEX: 21.16 KG/M2 | HEART RATE: 70 BPM

## 2021-04-05 DIAGNOSIS — Z00.00 ANNUAL PHYSICAL EXAM: ICD-10-CM

## 2021-04-05 DIAGNOSIS — E78.00 HYPERCHOLESTEREMIA: ICD-10-CM

## 2021-04-05 DIAGNOSIS — M81.0 OSTEOPOROSIS, POST-MENOPAUSAL: Primary | ICD-10-CM

## 2021-04-05 LAB — ABO + RH BLD: NORMAL

## 2021-04-05 PROCEDURE — 99499 UNLISTED E&M SERVICE: CPT | Mod: HCNC,S$GLB,, | Performed by: FAMILY MEDICINE

## 2021-04-05 PROCEDURE — 1159F PR MEDICATION LIST DOCUMENTED IN MEDICAL RECORD: ICD-10-PCS | Mod: S$GLB,,, | Performed by: FAMILY MEDICINE

## 2021-04-05 PROCEDURE — 3288F PR FALLS RISK ASSESSMENT DOCUMENTED: ICD-10-PCS | Mod: CPTII,S$GLB,, | Performed by: FAMILY MEDICINE

## 2021-04-05 PROCEDURE — 3288F FALL RISK ASSESSMENT DOCD: CPT | Mod: CPTII,S$GLB,, | Performed by: FAMILY MEDICINE

## 2021-04-05 PROCEDURE — 1159F MED LIST DOCD IN RCRD: CPT | Mod: S$GLB,,, | Performed by: FAMILY MEDICINE

## 2021-04-05 PROCEDURE — 1126F AMNT PAIN NOTED NONE PRSNT: CPT | Mod: S$GLB,,, | Performed by: FAMILY MEDICINE

## 2021-04-05 PROCEDURE — 99999 PR PBB SHADOW E&M-EST. PATIENT-LVL III: ICD-10-PCS | Mod: PBBFAC,,, | Performed by: FAMILY MEDICINE

## 2021-04-05 PROCEDURE — 99499 RISK ADDL DX/OHS AUDIT: ICD-10-PCS | Mod: HCNC,S$GLB,, | Performed by: FAMILY MEDICINE

## 2021-04-05 PROCEDURE — 99999 PR PBB SHADOW E&M-EST. PATIENT-LVL III: CPT | Mod: PBBFAC,,, | Performed by: FAMILY MEDICINE

## 2021-04-05 PROCEDURE — 36415 COLL VENOUS BLD VENIPUNCTURE: CPT | Performed by: FAMILY MEDICINE

## 2021-04-05 PROCEDURE — 99214 OFFICE O/P EST MOD 30 MIN: CPT | Mod: S$GLB,,, | Performed by: FAMILY MEDICINE

## 2021-04-05 PROCEDURE — 1101F PT FALLS ASSESS-DOCD LE1/YR: CPT | Mod: CPTII,S$GLB,, | Performed by: FAMILY MEDICINE

## 2021-04-05 PROCEDURE — 86900 BLOOD TYPING SEROLOGIC ABO: CPT | Performed by: FAMILY MEDICINE

## 2021-04-05 PROCEDURE — 3008F PR BODY MASS INDEX (BMI) DOCUMENTED: ICD-10-PCS | Mod: CPTII,S$GLB,, | Performed by: FAMILY MEDICINE

## 2021-04-05 PROCEDURE — 1101F PR PT FALLS ASSESS DOC 0-1 FALLS W/OUT INJ PAST YR: ICD-10-PCS | Mod: CPTII,S$GLB,, | Performed by: FAMILY MEDICINE

## 2021-04-05 PROCEDURE — 3008F BODY MASS INDEX DOCD: CPT | Mod: CPTII,S$GLB,, | Performed by: FAMILY MEDICINE

## 2021-04-05 PROCEDURE — 99214 PR OFFICE/OUTPT VISIT, EST, LEVL IV, 30-39 MIN: ICD-10-PCS | Mod: S$GLB,,, | Performed by: FAMILY MEDICINE

## 2021-04-05 PROCEDURE — 1126F PR PAIN SEVERITY QUANTIFIED, NO PAIN PRESENT: ICD-10-PCS | Mod: S$GLB,,, | Performed by: FAMILY MEDICINE

## 2021-04-08 ENCOUNTER — TELEPHONE (OUTPATIENT)
Dept: INTERNAL MEDICINE | Facility: CLINIC | Age: 71
End: 2021-04-08

## 2021-05-10 ENCOUNTER — INFUSION (OUTPATIENT)
Dept: INFECTIOUS DISEASES | Facility: HOSPITAL | Age: 71
End: 2021-05-10
Attending: INTERNAL MEDICINE
Payer: MEDICARE

## 2021-05-10 VITALS
TEMPERATURE: 99 F | SYSTOLIC BLOOD PRESSURE: 132 MMHG | DIASTOLIC BLOOD PRESSURE: 80 MMHG | HEART RATE: 66 BPM | OXYGEN SATURATION: 97 % | BODY MASS INDEX: 20.96 KG/M2 | WEIGHT: 104 LBS | HEIGHT: 59 IN | RESPIRATION RATE: 18 BRPM

## 2021-05-10 DIAGNOSIS — M81.0 AGE RELATED OSTEOPOROSIS, UNSPECIFIED PATHOLOGICAL FRACTURE PRESENCE: Primary | ICD-10-CM

## 2021-05-10 PROCEDURE — 63600175 PHARM REV CODE 636 W HCPCS: Mod: JG | Performed by: NURSE PRACTITIONER

## 2021-05-10 PROCEDURE — 96372 THER/PROPH/DIAG INJ SC/IM: CPT

## 2021-05-10 RX ADMIN — DENOSUMAB 60 MG: 60 INJECTION SUBCUTANEOUS at 08:05

## 2021-07-27 ENCOUNTER — TELEPHONE (OUTPATIENT)
Dept: OBSTETRICS AND GYNECOLOGY | Facility: CLINIC | Age: 71
End: 2021-07-27

## 2021-09-11 ENCOUNTER — PATIENT OUTREACH (OUTPATIENT)
Dept: ADMINISTRATIVE | Facility: OTHER | Age: 71
End: 2021-09-11

## 2021-09-17 ENCOUNTER — TELEPHONE (OUTPATIENT)
Dept: OBSTETRICS AND GYNECOLOGY | Facility: CLINIC | Age: 71
End: 2021-09-17

## 2021-09-17 DIAGNOSIS — Z12.31 ENCOUNTER FOR SCREENING MAMMOGRAM FOR BREAST CANCER: Primary | ICD-10-CM

## 2021-09-20 ENCOUNTER — TELEPHONE (OUTPATIENT)
Dept: OBSTETRICS AND GYNECOLOGY | Facility: CLINIC | Age: 71
End: 2021-09-20

## 2021-10-05 ENCOUNTER — OFFICE VISIT (OUTPATIENT)
Dept: OBSTETRICS AND GYNECOLOGY | Facility: CLINIC | Age: 71
End: 2021-10-05
Attending: OBSTETRICS & GYNECOLOGY
Payer: MEDICARE

## 2021-10-05 VITALS
BODY MASS INDEX: 18.8 KG/M2 | DIASTOLIC BLOOD PRESSURE: 72 MMHG | HEIGHT: 59 IN | SYSTOLIC BLOOD PRESSURE: 124 MMHG | WEIGHT: 93.25 LBS

## 2021-10-05 DIAGNOSIS — Z12.31 VISIT FOR SCREENING MAMMOGRAM: ICD-10-CM

## 2021-10-05 DIAGNOSIS — M81.0 AGE-RELATED OSTEOPOROSIS WITHOUT CURRENT PATHOLOGICAL FRACTURE: ICD-10-CM

## 2021-10-05 DIAGNOSIS — Z01.419 WOMEN'S ANNUAL ROUTINE GYNECOLOGICAL EXAMINATION: Primary | ICD-10-CM

## 2021-10-05 DIAGNOSIS — Z78.0 POSTMENOPAUSAL STATUS: ICD-10-CM

## 2021-10-05 PROCEDURE — 99999 PR PBB SHADOW E&M-EST. PATIENT-LVL III: ICD-10-PCS | Mod: PBBFAC,HCNC,, | Performed by: OBSTETRICS & GYNECOLOGY

## 2021-10-05 PROCEDURE — 99499 UNLISTED E&M SERVICE: CPT | Mod: S$GLB,,, | Performed by: OBSTETRICS & GYNECOLOGY

## 2021-10-05 PROCEDURE — 99999 PR PBB SHADOW E&M-EST. PATIENT-LVL III: CPT | Mod: PBBFAC,HCNC,, | Performed by: OBSTETRICS & GYNECOLOGY

## 2021-10-05 PROCEDURE — 1126F AMNT PAIN NOTED NONE PRSNT: CPT | Mod: HCNC,CPTII,S$GLB, | Performed by: OBSTETRICS & GYNECOLOGY

## 2021-10-05 PROCEDURE — 3008F BODY MASS INDEX DOCD: CPT | Mod: HCNC,CPTII,S$GLB, | Performed by: OBSTETRICS & GYNECOLOGY

## 2021-10-05 PROCEDURE — 1159F MED LIST DOCD IN RCRD: CPT | Mod: HCNC,CPTII,S$GLB, | Performed by: OBSTETRICS & GYNECOLOGY

## 2021-10-05 PROCEDURE — 1101F PR PT FALLS ASSESS DOC 0-1 FALLS W/OUT INJ PAST YR: ICD-10-PCS | Mod: HCNC,CPTII,S$GLB, | Performed by: OBSTETRICS & GYNECOLOGY

## 2021-10-05 PROCEDURE — 3074F PR MOST RECENT SYSTOLIC BLOOD PRESSURE < 130 MM HG: ICD-10-PCS | Mod: HCNC,CPTII,S$GLB, | Performed by: OBSTETRICS & GYNECOLOGY

## 2021-10-05 PROCEDURE — 3044F PR MOST RECENT HEMOGLOBIN A1C LEVEL <7.0%: ICD-10-PCS | Mod: HCNC,CPTII,S$GLB, | Performed by: OBSTETRICS & GYNECOLOGY

## 2021-10-05 PROCEDURE — 3078F PR MOST RECENT DIASTOLIC BLOOD PRESSURE < 80 MM HG: ICD-10-PCS | Mod: HCNC,CPTII,S$GLB, | Performed by: OBSTETRICS & GYNECOLOGY

## 2021-10-05 PROCEDURE — 99499 RISK ADDL DX/OHS AUDIT: ICD-10-PCS | Mod: S$GLB,,, | Performed by: OBSTETRICS & GYNECOLOGY

## 2021-10-05 PROCEDURE — 1159F PR MEDICATION LIST DOCUMENTED IN MEDICAL RECORD: ICD-10-PCS | Mod: HCNC,CPTII,S$GLB, | Performed by: OBSTETRICS & GYNECOLOGY

## 2021-10-05 PROCEDURE — 3288F PR FALLS RISK ASSESSMENT DOCUMENTED: ICD-10-PCS | Mod: HCNC,CPTII,S$GLB, | Performed by: OBSTETRICS & GYNECOLOGY

## 2021-10-05 PROCEDURE — 1160F RVW MEDS BY RX/DR IN RCRD: CPT | Mod: HCNC,CPTII,S$GLB, | Performed by: OBSTETRICS & GYNECOLOGY

## 2021-10-05 PROCEDURE — 3008F PR BODY MASS INDEX (BMI) DOCUMENTED: ICD-10-PCS | Mod: HCNC,CPTII,S$GLB, | Performed by: OBSTETRICS & GYNECOLOGY

## 2021-10-05 PROCEDURE — 1101F PT FALLS ASSESS-DOCD LE1/YR: CPT | Mod: HCNC,CPTII,S$GLB, | Performed by: OBSTETRICS & GYNECOLOGY

## 2021-10-05 PROCEDURE — G0101 PR CA SCREEN;PELVIC/BREAST EXAM: ICD-10-PCS | Mod: HCNC,S$GLB,, | Performed by: OBSTETRICS & GYNECOLOGY

## 2021-10-05 PROCEDURE — 1126F PR PAIN SEVERITY QUANTIFIED, NO PAIN PRESENT: ICD-10-PCS | Mod: HCNC,CPTII,S$GLB, | Performed by: OBSTETRICS & GYNECOLOGY

## 2021-10-05 PROCEDURE — 3044F HG A1C LEVEL LT 7.0%: CPT | Mod: HCNC,CPTII,S$GLB, | Performed by: OBSTETRICS & GYNECOLOGY

## 2021-10-05 PROCEDURE — G0101 CA SCREEN;PELVIC/BREAST EXAM: HCPCS | Mod: HCNC,S$GLB,, | Performed by: OBSTETRICS & GYNECOLOGY

## 2021-10-05 PROCEDURE — 3288F FALL RISK ASSESSMENT DOCD: CPT | Mod: HCNC,CPTII,S$GLB, | Performed by: OBSTETRICS & GYNECOLOGY

## 2021-10-05 PROCEDURE — 3078F DIAST BP <80 MM HG: CPT | Mod: HCNC,CPTII,S$GLB, | Performed by: OBSTETRICS & GYNECOLOGY

## 2021-10-05 PROCEDURE — 3074F SYST BP LT 130 MM HG: CPT | Mod: HCNC,CPTII,S$GLB, | Performed by: OBSTETRICS & GYNECOLOGY

## 2021-10-05 PROCEDURE — 1160F PR REVIEW ALL MEDS BY PRESCRIBER/CLIN PHARMACIST DOCUMENTED: ICD-10-PCS | Mod: HCNC,CPTII,S$GLB, | Performed by: OBSTETRICS & GYNECOLOGY

## 2021-10-05 RX ORDER — DENOSUMAB 60 MG/ML
INJECTION SUBCUTANEOUS
COMMUNITY
Start: 2021-05-10 | End: 2022-10-28

## 2021-10-20 ENCOUNTER — PATIENT MESSAGE (OUTPATIENT)
Dept: ENDOCRINOLOGY | Facility: CLINIC | Age: 71
End: 2021-10-20

## 2021-10-20 ENCOUNTER — TELEPHONE (OUTPATIENT)
Dept: OBSTETRICS AND GYNECOLOGY | Facility: CLINIC | Age: 71
End: 2021-10-20

## 2021-10-20 ENCOUNTER — HOSPITAL ENCOUNTER (OUTPATIENT)
Dept: RADIOLOGY | Facility: OTHER | Age: 71
Discharge: HOME | End: 2021-10-20
Attending: OBSTETRICS & GYNECOLOGY
Payer: MEDICARE

## 2021-10-20 ENCOUNTER — HOSPITAL ENCOUNTER (OUTPATIENT)
Dept: RADIOLOGY | Facility: OTHER | Age: 71
Discharge: HOME | End: 2021-10-20
Attending: NURSE PRACTITIONER
Payer: MEDICARE

## 2021-10-20 DIAGNOSIS — M81.0 OSTEOPOROSIS, POST-MENOPAUSAL: ICD-10-CM

## 2021-10-20 DIAGNOSIS — Z12.31 ENCOUNTER FOR SCREENING MAMMOGRAM FOR BREAST CANCER: ICD-10-CM

## 2021-10-20 PROCEDURE — 77080 DXA BONE DENSITY AXIAL: CPT | Mod: 26,HCNC,, | Performed by: INTERNAL MEDICINE

## 2021-10-20 PROCEDURE — 77067 SCR MAMMO BI INCL CAD: CPT | Mod: 26,HCNC,, | Performed by: RADIOLOGY

## 2021-10-20 PROCEDURE — 77067 MAMMO DIGITAL SCREENING BILAT WITH TOMO: ICD-10-PCS | Mod: 26,HCNC,, | Performed by: RADIOLOGY

## 2021-10-20 PROCEDURE — 77067 SCR MAMMO BI INCL CAD: CPT | Mod: TC,HCNC

## 2021-10-20 PROCEDURE — 77063 MAMMO DIGITAL SCREENING BILAT WITH TOMO: ICD-10-PCS | Mod: 26,HCNC,, | Performed by: RADIOLOGY

## 2021-10-20 PROCEDURE — 77063 BREAST TOMOSYNTHESIS BI: CPT | Mod: 26,HCNC,, | Performed by: RADIOLOGY

## 2021-10-20 PROCEDURE — 77080 DXA BONE DENSITY AXIAL: CPT | Mod: TC,HCNC

## 2021-10-20 PROCEDURE — 77080 DEXA BONE DENSITY SPINE HIP: ICD-10-PCS | Mod: 26,HCNC,, | Performed by: INTERNAL MEDICINE

## 2021-10-21 ENCOUNTER — PATIENT MESSAGE (OUTPATIENT)
Dept: OBSTETRICS AND GYNECOLOGY | Facility: CLINIC | Age: 71
End: 2021-10-21

## 2021-10-26 ENCOUNTER — PATIENT OUTREACH (OUTPATIENT)
Dept: ADMINISTRATIVE | Facility: HOSPITAL | Age: 71
End: 2021-10-26
Payer: MEDICARE

## 2021-11-08 ENCOUNTER — PATIENT MESSAGE (OUTPATIENT)
Dept: ENDOCRINOLOGY | Facility: CLINIC | Age: 71
End: 2021-11-08
Payer: MEDICARE

## 2021-11-08 ENCOUNTER — PATIENT OUTREACH (OUTPATIENT)
Dept: ADMINISTRATIVE | Facility: HOSPITAL | Age: 71
End: 2021-11-08
Payer: MEDICARE

## 2021-11-12 ENCOUNTER — INFUSION (OUTPATIENT)
Dept: INFECTIOUS DISEASES | Facility: HOSPITAL | Age: 71
End: 2021-11-12
Attending: INTERNAL MEDICINE
Payer: MEDICARE

## 2021-11-12 VITALS
SYSTOLIC BLOOD PRESSURE: 136 MMHG | BODY MASS INDEX: 18.35 KG/M2 | OXYGEN SATURATION: 100 % | TEMPERATURE: 97 F | HEART RATE: 72 BPM | DIASTOLIC BLOOD PRESSURE: 63 MMHG | WEIGHT: 90.81 LBS | RESPIRATION RATE: 20 BRPM

## 2021-11-12 DIAGNOSIS — M81.0 AGE RELATED OSTEOPOROSIS, UNSPECIFIED PATHOLOGICAL FRACTURE PRESENCE: Primary | ICD-10-CM

## 2021-11-12 PROCEDURE — 63600175 PHARM REV CODE 636 W HCPCS: Mod: JG,HCNC | Performed by: NURSE PRACTITIONER

## 2021-11-12 PROCEDURE — 96372 THER/PROPH/DIAG INJ SC/IM: CPT | Mod: HCNC

## 2021-11-12 RX ADMIN — DENOSUMAB 60 MG: 60 INJECTION SUBCUTANEOUS at 08:11

## 2021-12-22 ENCOUNTER — TELEPHONE (OUTPATIENT)
Dept: INTERNAL MEDICINE | Facility: CLINIC | Age: 71
End: 2021-12-22
Payer: MEDICARE

## 2021-12-22 DIAGNOSIS — Z11.52 ENCOUNTER FOR SCREENING FOR COVID-19: Primary | ICD-10-CM

## 2022-01-05 NOTE — PROGRESS NOTES
Alicia Toussaint is a 69 y.o. year old  female who presents for routine GYN exam.  Postmenopausal, not on HRT.  No bleeding, flashes, or sweats.  She has a history of osteoporosis and has been off of Fosamax for about one year.  Reports weight loss over the past year.  No GYN complaints.    Pap 18: Negative    Past Medical History:   Diagnosis Date    Abnormal weight loss 2017    Arthritis 2018    Complication of anesthesia     colonoscopy-cardiac arrest from anes    Elevated TSH 2017    Hypercholesteremia 2017    Osteoporosis, post-menopausal     Vitamin D deficiency 2017       Past Surgical History:   Procedure Laterality Date    COLONOSCOPY      KNEE ARTHROPLASTY  1985    TUBAL LIGATION         OB History        2    Para   2    Term   2            AB        Living   2       SAB        TAB        Ectopic        Multiple        Live Births   2                   ROS:  GENERAL: Reports weight loss.   SKIN: Denies rash or lesions.   HEAD: Denies head injury or headache.   NODES: Denies enlarged lymph nodes.   CHEST: Denies chest pain or shortness of breath.   CARDIOVASCULAR: Denies palpitations or left sided chest pain.   ABDOMEN: No abdominal pain, nausea, vomiting or rectal bleeding.   URINARY: No dysuria or hematuria.  REPRODUCTIVE: See HPI.   BREASTS: Denies pain, lumps, or nipple discharge.   HEMATOLOGIC: No easy bruisability or excessive bleeding.   MUSCULOSKELETAL: Denies joint pain.  NEUROLOGIC: Denies syncope or weakness.   PSYCHIATRIC: Denies depression.     PE:   (chaperone present during entire exam)  APPEARANCE: Well nourished, well developed, in no acute distress.  BREASTS: Symmetrical, no skin changes or visible lesions. No palpable masses, nipple discharge or adenopathy bilaterally.  ABDOMEN: Soft. No tenderness or masses. No hernias. No CVA tenderness.  VULVA: Atrophic. No lesions. Normal female genitalia.  URETHRAL MEATUS: Normal size  and location, no lesions, no prolapse.  URETHRA: No masses, tenderness, prolapse or scarring.  VAGINA: Atrophic.  No lesions, no abnormal discharge, no significant cystocele or rectocele.  CERVIX: No lesions and discharge.   UTERUS: Normal size, regular shape, mobile, non-tender, bladder base nontender.  ADNEXA: No masses, tenderness or CDS nodularity.  ANUS PERINEUM: Normal.    Diagnosis:  1. Well woman exam with routine gynecological exam    2. Postmenopausal status    3. Visit for screening mammogram    4. Age-related osteoporosis without current pathological fracture          PLAN:    Orders Placed This Encounter    Mammo Digital Screening Bilat w/ Billy    DXA Bone Density Spine And Hip       Patient was counseled today on postmenopausal issues, including her osteoporosis for which she will update her BMD this year.     Follow-up in 1 year.   Duration Of Freeze Thaw-Cycle (Seconds): 10 Post-Care Instructions: I reviewed with the patient in detail post-care instructions. Patient is to wear sunprotection, and avoid picking at any of the treated lesions. Pt may apply Vaseline to crusted or scabbing areas. Render Note In Bullet Format When Appropriate: No Show Aperture Variable?: Yes Consent: The patient's consent was obtained including but not limited to risks of crusting, scabbing, blistering, scarring, darker or lighter pigmentary change, recurrence, incomplete removal and infection. Detail Level: Detailed Number Of Freeze-Thaw Cycles: 2 freeze-thaw cycles

## 2022-05-13 ENCOUNTER — INFUSION (OUTPATIENT)
Dept: INFECTIOUS DISEASES | Facility: HOSPITAL | Age: 72
End: 2022-05-13
Attending: INTERNAL MEDICINE
Payer: MEDICARE

## 2022-05-13 VITALS
OXYGEN SATURATION: 98 % | BODY MASS INDEX: 18.21 KG/M2 | HEART RATE: 67 BPM | DIASTOLIC BLOOD PRESSURE: 69 MMHG | SYSTOLIC BLOOD PRESSURE: 128 MMHG | WEIGHT: 90.19 LBS | RESPIRATION RATE: 16 BRPM | TEMPERATURE: 98 F

## 2022-05-13 DIAGNOSIS — M81.0 AGE RELATED OSTEOPOROSIS, UNSPECIFIED PATHOLOGICAL FRACTURE PRESENCE: Primary | ICD-10-CM

## 2022-05-13 PROCEDURE — 63600175 PHARM REV CODE 636 W HCPCS: Mod: JG | Performed by: NURSE PRACTITIONER

## 2022-05-13 PROCEDURE — 96372 THER/PROPH/DIAG INJ SC/IM: CPT

## 2022-05-13 RX ADMIN — DENOSUMAB 60 MG: 60 INJECTION SUBCUTANEOUS at 08:05

## 2022-05-13 NOTE — PROGRESS NOTES
Patient received Prolia 60 mg injection sub Q in right arm. Denies dental surgery Tolerated well and left in NAD    Patient is taking Vit D  Next appointment made.

## 2022-05-30 ENCOUNTER — PATIENT MESSAGE (OUTPATIENT)
Dept: ADMINISTRATIVE | Facility: HOSPITAL | Age: 72
End: 2022-05-30
Payer: MEDICARE

## 2022-06-27 ENCOUNTER — TELEPHONE (OUTPATIENT)
Dept: OBSTETRICS AND GYNECOLOGY | Facility: CLINIC | Age: 72
End: 2022-06-27

## 2022-06-27 NOTE — TELEPHONE ENCOUNTER
----- Message from Sissy Christian sent at 6/27/2022 11:46 AM CDT -----  Regarding: call back  Name of Who is Calling:JACQUELINE JUNE [[id number]]          What is the request in detail: call back           Can the clinic reply by [facility]: no           What [facility]:[phone]/ [phone]

## 2022-06-27 NOTE — TELEPHONE ENCOUNTER
Called the patient and she states she asked the person who answered the phone to have Dr Jones call her, she needs to discuss pain and the results of her BMD test and another possible medication. Patient declined an in office or virtual visit.

## 2022-06-28 NOTE — TELEPHONE ENCOUNTER
----- Message from America Topete sent at 6/28/2022  1:20 PM CDT -----  Who Called:PT        Who Left Message for Patient:Vignesh Jones MD           Does the patient know what this is regarding?:YES        Best Call Back Number:[phone]        Additional Information:

## 2022-06-30 ENCOUNTER — TELEPHONE (OUTPATIENT)
Dept: OBSTETRICS AND GYNECOLOGY | Facility: CLINIC | Age: 72
End: 2022-06-30
Payer: MEDICARE

## 2022-06-30 NOTE — TELEPHONE ENCOUNTER
----- Message from Elke Woody sent at 6/30/2022  1:06 PM CDT -----  Regarding: problem/concern  Contact: JACQUELINE JUNE [[id number]]  Name of Who is Calling:JACQUELINE JUNE [[id number]]          What is the request in detail: Pt states she is requesting a call back in regards to pain she  is in due to the medication, Please advise she states she be trying since Monday .           Can the clinic reply by [facility]: No           What [facility] Back [facility]:[phone]

## 2022-06-30 NOTE — TELEPHONE ENCOUNTER
Called patient:    She has been taking Prolia and reports the development of lower back pain.    She wonders whether this back discomfort is related to the Prolia.    She was instructed to contact her endocrinologist to discuss continued usage of Prolia.

## 2022-07-28 ENCOUNTER — TELEPHONE (OUTPATIENT)
Dept: INTERNAL MEDICINE | Facility: CLINIC | Age: 72
End: 2022-07-28
Payer: MEDICARE

## 2022-07-28 DIAGNOSIS — R79.9 ABNORMAL FINDING OF BLOOD CHEMISTRY, UNSPECIFIED: ICD-10-CM

## 2022-07-28 DIAGNOSIS — Z00.00 ANNUAL PHYSICAL EXAM: Primary | ICD-10-CM

## 2022-07-28 NOTE — TELEPHONE ENCOUNTER
----- Message from Nupur Phelan sent at 7/28/2022  3:03 PM CDT -----  Name of Who is Calling: JACQUELINE JUNE         What is the request in detail: The patient is calling to request lab orders be put in. Please advise          Can the clinic reply by [facility]: No         What Number to Call Back if not in [facility]: [phone]

## 2022-08-10 ENCOUNTER — LAB VISIT (OUTPATIENT)
Dept: LAB | Facility: OTHER | Age: 72
End: 2022-08-10
Attending: FAMILY MEDICINE
Payer: MEDICARE

## 2022-08-10 DIAGNOSIS — Z00.00 ANNUAL PHYSICAL EXAM: ICD-10-CM

## 2022-08-10 DIAGNOSIS — R79.9 ABNORMAL FINDING OF BLOOD CHEMISTRY, UNSPECIFIED: ICD-10-CM

## 2022-08-10 LAB
ALBUMIN SERPL BCP-MCNC: 3.7 G/DL (ref 3.5–5.2)
ALP SERPL-CCNC: 40 U/L (ref 55–135)
ALT SERPL W/O P-5'-P-CCNC: 20 U/L (ref 10–44)
ANION GAP SERPL CALC-SCNC: 11 MMOL/L (ref 8–16)
AST SERPL-CCNC: 24 U/L (ref 10–40)
BILIRUB SERPL-MCNC: 0.4 MG/DL (ref 0.1–1)
BUN SERPL-MCNC: 15 MG/DL (ref 8–23)
CALCIUM SERPL-MCNC: 9.1 MG/DL (ref 8.7–10.5)
CHLORIDE SERPL-SCNC: 108 MMOL/L (ref 95–110)
CHOLEST SERPL-MCNC: 197 MG/DL (ref 120–199)
CHOLEST/HDLC SERPL: 3 {RATIO} (ref 2–5)
CO2 SERPL-SCNC: 23 MMOL/L (ref 23–29)
CREAT SERPL-MCNC: 0.7 MG/DL (ref 0.5–1.4)
EST. GFR  (NO RACE VARIABLE): >60 ML/MIN/1.73 M^2
ESTIMATED AVG GLUCOSE: 111 MG/DL (ref 68–131)
GLUCOSE SERPL-MCNC: 92 MG/DL (ref 70–110)
HBA1C MFR BLD: 5.5 % (ref 4–5.6)
HDLC SERPL-MCNC: 66 MG/DL (ref 40–75)
HDLC SERPL: 33.5 % (ref 20–50)
LDLC SERPL CALC-MCNC: 119 MG/DL (ref 63–159)
NONHDLC SERPL-MCNC: 131 MG/DL
POTASSIUM SERPL-SCNC: 4.3 MMOL/L (ref 3.5–5.1)
PROT SERPL-MCNC: 6.6 G/DL (ref 6–8.4)
SODIUM SERPL-SCNC: 142 MMOL/L (ref 136–145)
TRIGL SERPL-MCNC: 60 MG/DL (ref 30–150)

## 2022-08-10 PROCEDURE — 80053 COMPREHEN METABOLIC PANEL: CPT | Performed by: FAMILY MEDICINE

## 2022-08-10 PROCEDURE — 83036 HEMOGLOBIN GLYCOSYLATED A1C: CPT | Performed by: FAMILY MEDICINE

## 2022-08-10 PROCEDURE — 36415 COLL VENOUS BLD VENIPUNCTURE: CPT | Performed by: FAMILY MEDICINE

## 2022-08-10 PROCEDURE — 80061 LIPID PANEL: CPT | Performed by: FAMILY MEDICINE

## 2022-08-24 ENCOUNTER — OFFICE VISIT (OUTPATIENT)
Dept: INTERNAL MEDICINE | Facility: CLINIC | Age: 72
End: 2022-08-24
Attending: FAMILY MEDICINE
Payer: MEDICARE

## 2022-08-24 VITALS
OXYGEN SATURATION: 99 % | WEIGHT: 96 LBS | SYSTOLIC BLOOD PRESSURE: 120 MMHG | HEART RATE: 68 BPM | DIASTOLIC BLOOD PRESSURE: 68 MMHG | BODY MASS INDEX: 19.35 KG/M2 | HEIGHT: 59 IN

## 2022-08-24 DIAGNOSIS — M81.0 OSTEOPOROSIS, POST-MENOPAUSAL: Primary | ICD-10-CM

## 2022-08-24 DIAGNOSIS — E78.00 HYPERCHOLESTEREMIA: ICD-10-CM

## 2022-08-24 PROCEDURE — 3078F PR MOST RECENT DIASTOLIC BLOOD PRESSURE < 80 MM HG: ICD-10-PCS | Mod: CPTII,S$GLB,, | Performed by: FAMILY MEDICINE

## 2022-08-24 PROCEDURE — 99999 PR PBB SHADOW E&M-EST. PATIENT-LVL III: ICD-10-PCS | Mod: PBBFAC,,, | Performed by: FAMILY MEDICINE

## 2022-08-24 PROCEDURE — 3008F BODY MASS INDEX DOCD: CPT | Mod: CPTII,S$GLB,, | Performed by: FAMILY MEDICINE

## 2022-08-24 PROCEDURE — 3044F HG A1C LEVEL LT 7.0%: CPT | Mod: CPTII,S$GLB,, | Performed by: FAMILY MEDICINE

## 2022-08-24 PROCEDURE — 99214 PR OFFICE/OUTPT VISIT, EST, LEVL IV, 30-39 MIN: ICD-10-PCS | Mod: S$GLB,,, | Performed by: FAMILY MEDICINE

## 2022-08-24 PROCEDURE — 1160F PR REVIEW ALL MEDS BY PRESCRIBER/CLIN PHARMACIST DOCUMENTED: ICD-10-PCS | Mod: CPTII,S$GLB,, | Performed by: FAMILY MEDICINE

## 2022-08-24 PROCEDURE — 1101F PT FALLS ASSESS-DOCD LE1/YR: CPT | Mod: CPTII,S$GLB,, | Performed by: FAMILY MEDICINE

## 2022-08-24 PROCEDURE — 1126F PR PAIN SEVERITY QUANTIFIED, NO PAIN PRESENT: ICD-10-PCS | Mod: CPTII,S$GLB,, | Performed by: FAMILY MEDICINE

## 2022-08-24 PROCEDURE — 3074F SYST BP LT 130 MM HG: CPT | Mod: CPTII,S$GLB,, | Performed by: FAMILY MEDICINE

## 2022-08-24 PROCEDURE — 1126F AMNT PAIN NOTED NONE PRSNT: CPT | Mod: CPTII,S$GLB,, | Performed by: FAMILY MEDICINE

## 2022-08-24 PROCEDURE — 3008F PR BODY MASS INDEX (BMI) DOCUMENTED: ICD-10-PCS | Mod: CPTII,S$GLB,, | Performed by: FAMILY MEDICINE

## 2022-08-24 PROCEDURE — 1101F PR PT FALLS ASSESS DOC 0-1 FALLS W/OUT INJ PAST YR: ICD-10-PCS | Mod: CPTII,S$GLB,, | Performed by: FAMILY MEDICINE

## 2022-08-24 PROCEDURE — 3074F PR MOST RECENT SYSTOLIC BLOOD PRESSURE < 130 MM HG: ICD-10-PCS | Mod: CPTII,S$GLB,, | Performed by: FAMILY MEDICINE

## 2022-08-24 PROCEDURE — 1160F RVW MEDS BY RX/DR IN RCRD: CPT | Mod: CPTII,S$GLB,, | Performed by: FAMILY MEDICINE

## 2022-08-24 PROCEDURE — 99999 PR PBB SHADOW E&M-EST. PATIENT-LVL III: CPT | Mod: PBBFAC,,, | Performed by: FAMILY MEDICINE

## 2022-08-24 PROCEDURE — 3288F PR FALLS RISK ASSESSMENT DOCUMENTED: ICD-10-PCS | Mod: CPTII,S$GLB,, | Performed by: FAMILY MEDICINE

## 2022-08-24 PROCEDURE — 3044F PR MOST RECENT HEMOGLOBIN A1C LEVEL <7.0%: ICD-10-PCS | Mod: CPTII,S$GLB,, | Performed by: FAMILY MEDICINE

## 2022-08-24 PROCEDURE — 3288F FALL RISK ASSESSMENT DOCD: CPT | Mod: CPTII,S$GLB,, | Performed by: FAMILY MEDICINE

## 2022-08-24 PROCEDURE — 99214 OFFICE O/P EST MOD 30 MIN: CPT | Mod: S$GLB,,, | Performed by: FAMILY MEDICINE

## 2022-08-24 PROCEDURE — 1159F PR MEDICATION LIST DOCUMENTED IN MEDICAL RECORD: ICD-10-PCS | Mod: CPTII,S$GLB,, | Performed by: FAMILY MEDICINE

## 2022-08-24 PROCEDURE — 1159F MED LIST DOCD IN RCRD: CPT | Mod: CPTII,S$GLB,, | Performed by: FAMILY MEDICINE

## 2022-08-24 PROCEDURE — 3078F DIAST BP <80 MM HG: CPT | Mod: CPTII,S$GLB,, | Performed by: FAMILY MEDICINE

## 2022-08-24 NOTE — PROGRESS NOTES
"CHIEF COMPLAINT:  Annual    HISTORY OF PRESENT ILLNESS: The patient is a remarkably healthy 72-year-old female.  The patient has no specific complaints today.      She does have osteoporosis for which she used to use Prolia.  She was intolerant of bisphosphonates with jaw pain.    REVIEW OF SYSTEMS:  GENERAL: No fever, chills, fatigability or weight loss.  SKIN: No rashes, itching or changes in color or texture of skin.  HEAD: No headaches or recent head trauma.  EYES: Visual acuity fine. No photophobia, ocular pain or diplopia.  EARS: Denies ear pain, discharge or vertigo.  NOSE: No loss of smell, no epistaxis or postnasal drip.  MOUTH & THROAT: No hoarseness or change in voice. No excessive gum bleeding.  NODES: Denies swollen glands.  CHEST: Denies FREITAS, cyanosis, wheezing, cough and sputum production.  CARDIOVASCULAR: Denies chest pain, PND, orthopnea or reduced exercise tolerance.  ABDOMEN: Appetite fine. No weight loss. Denies diarrhea, abdominal pain, hematemesis or blood in stool.  URINARY: No flank pain, dysuria or hematuria.  PERIPHERAL VASCULAR: No claudication or cyanosis.  MUSCULOSKELETAL: No joint stiffness or swelling. Denies back pain. Except as above  NEUROLOGIC: No history of seizures, paralysis, alteration of gait or coordination.    SOCIAL HISTORY: The patient does not smoke.  The patient consumes alcohol socially.  The patient is .    PHYSICAL EXAMINATION:   Blood pressure 120/68, pulse 68, height 4' 11" (1.499 m), weight 43.6 kg (96 lb 0.2 oz), SpO2 99 %.    APPEARANCE: Well nourished, well developed, in no acute distress.    HEAD: Normocephalic, atraumatic.  EYES: PERRL. EOMI.  Conjunctivae without injection and  anicteric  NOSE: Mucosa pink. Airway clear.  MOUTH & THROAT: No tonsillar enlargement. No pharyngeal erythema or exudate. No stridor.  NECK: Supple.   NODES: No cervical, axillary or inguinal lymph node enlargement.  CHEST: Lungs clear to auscultation.  No retractions are noted. "  No rales or rhonchi are present.  CARDIOVASCULAR: Normal S1, S2. No rubs, murmurs or gallops.  ABDOMEN: Bowel sounds normal. Not distended. Soft. No tenderness or masses.  No ascites is noted.  MUSCULOSKELETAL:  There is no clubbing, cyanosis, or edema of the extremities x4.  There is full range of motion of the lumbar spine.  There is full range of motion of the extremities x4.  There is no deformity noted.    NEUROLOGIC:       Normal speech development.      Hearing normal.      Normal gait.      Motor and sensory exams grossly normal.  PSYCHIATRIC: Patient is alert and oriented x3.  Thought processes are all normal.  There is no homicidality.  There is no suicidality.  There is no evidence of psychosis.    LABORATORY/RADIOLOGY:   Chart reviewed.  We recently updated blood work.    ASSESSMENT:   Normal physical exam in an apparently healthy individual  Osteoporosis  Hyperlipidemia    PLAN:  Recent blood work was NL  Return to clinic in one year.

## 2022-08-30 ENCOUNTER — TELEPHONE (OUTPATIENT)
Dept: OBSTETRICS AND GYNECOLOGY | Facility: CLINIC | Age: 72
End: 2022-08-30
Payer: MEDICARE

## 2022-08-30 DIAGNOSIS — Z12.31 SCREENING MAMMOGRAM FOR BREAST CANCER: Primary | ICD-10-CM

## 2022-08-30 NOTE — TELEPHONE ENCOUNTER
----- Message from Kaden Valdes sent at 8/30/2022  4:50 PM CDT -----  Regarding: Mammo Orders  Contact: JACQUELINE JUNE [[id number]]  Name of Who is Calling: JACQUELINE JUNE [[id number]]      What is the request in detail: Patient would like to have her Mammogram orders put in       Can the clinic reply by [facility]: yes      What Number to Call Back if not in [facility]: [phone] or [phone]

## 2022-10-25 ENCOUNTER — HOSPITAL ENCOUNTER (OUTPATIENT)
Dept: RADIOLOGY | Facility: OTHER | Age: 72
Discharge: HOME | End: 2022-10-25
Attending: OBSTETRICS & GYNECOLOGY
Payer: MEDICARE

## 2022-10-25 DIAGNOSIS — Z12.31 SCREENING MAMMOGRAM FOR BREAST CANCER: ICD-10-CM

## 2022-10-25 PROCEDURE — 77063 BREAST TOMOSYNTHESIS BI: CPT | Mod: TC

## 2022-10-25 PROCEDURE — 77067 SCR MAMMO BI INCL CAD: CPT | Mod: 26,,, | Performed by: RADIOLOGY

## 2022-10-25 PROCEDURE — 77063 MAMMO DIGITAL SCREENING BILAT WITH TOMO: ICD-10-PCS | Mod: 26,,, | Performed by: RADIOLOGY

## 2022-10-25 PROCEDURE — 77063 BREAST TOMOSYNTHESIS BI: CPT | Mod: 26,,, | Performed by: RADIOLOGY

## 2022-10-25 PROCEDURE — 77067 MAMMO DIGITAL SCREENING BILAT WITH TOMO: ICD-10-PCS | Mod: 26,,, | Performed by: RADIOLOGY

## 2022-10-26 ENCOUNTER — PATIENT MESSAGE (OUTPATIENT)
Dept: OBSTETRICS AND GYNECOLOGY | Facility: CLINIC | Age: 72
End: 2022-10-26
Payer: MEDICARE

## 2022-10-28 ENCOUNTER — OFFICE VISIT (OUTPATIENT)
Dept: OBSTETRICS AND GYNECOLOGY | Facility: CLINIC | Age: 72
End: 2022-10-28
Attending: OBSTETRICS & GYNECOLOGY
Payer: MEDICARE

## 2022-10-28 VITALS
SYSTOLIC BLOOD PRESSURE: 126 MMHG | DIASTOLIC BLOOD PRESSURE: 74 MMHG | HEIGHT: 59 IN | BODY MASS INDEX: 19.42 KG/M2 | WEIGHT: 96.31 LBS

## 2022-10-28 DIAGNOSIS — Z01.419 WOMEN'S ANNUAL ROUTINE GYNECOLOGICAL EXAMINATION: Primary | ICD-10-CM

## 2022-10-28 DIAGNOSIS — M85.89 OSTEOPENIA OF MULTIPLE SITES: ICD-10-CM

## 2022-10-28 DIAGNOSIS — Z78.0 POSTMENOPAUSAL STATUS: ICD-10-CM

## 2022-10-28 PROCEDURE — 3078F PR MOST RECENT DIASTOLIC BLOOD PRESSURE < 80 MM HG: ICD-10-PCS | Mod: CPTII,S$GLB,, | Performed by: OBSTETRICS & GYNECOLOGY

## 2022-10-28 PROCEDURE — 1126F PR PAIN SEVERITY QUANTIFIED, NO PAIN PRESENT: ICD-10-PCS | Mod: CPTII,S$GLB,, | Performed by: OBSTETRICS & GYNECOLOGY

## 2022-10-28 PROCEDURE — 3078F DIAST BP <80 MM HG: CPT | Mod: CPTII,S$GLB,, | Performed by: OBSTETRICS & GYNECOLOGY

## 2022-10-28 PROCEDURE — 3044F HG A1C LEVEL LT 7.0%: CPT | Mod: CPTII,S$GLB,, | Performed by: OBSTETRICS & GYNECOLOGY

## 2022-10-28 PROCEDURE — 99999 PR PBB SHADOW E&M-EST. PATIENT-LVL III: ICD-10-PCS | Mod: PBBFAC,,, | Performed by: OBSTETRICS & GYNECOLOGY

## 2022-10-28 PROCEDURE — 1159F MED LIST DOCD IN RCRD: CPT | Mod: CPTII,S$GLB,, | Performed by: OBSTETRICS & GYNECOLOGY

## 2022-10-28 PROCEDURE — G0101 CA SCREEN;PELVIC/BREAST EXAM: HCPCS | Mod: S$GLB,,, | Performed by: OBSTETRICS & GYNECOLOGY

## 2022-10-28 PROCEDURE — 1160F PR REVIEW ALL MEDS BY PRESCRIBER/CLIN PHARMACIST DOCUMENTED: ICD-10-PCS | Mod: CPTII,S$GLB,, | Performed by: OBSTETRICS & GYNECOLOGY

## 2022-10-28 PROCEDURE — 3074F PR MOST RECENT SYSTOLIC BLOOD PRESSURE < 130 MM HG: ICD-10-PCS | Mod: CPTII,S$GLB,, | Performed by: OBSTETRICS & GYNECOLOGY

## 2022-10-28 PROCEDURE — 3074F SYST BP LT 130 MM HG: CPT | Mod: CPTII,S$GLB,, | Performed by: OBSTETRICS & GYNECOLOGY

## 2022-10-28 PROCEDURE — G0101 PR CA SCREEN;PELVIC/BREAST EXAM: ICD-10-PCS | Mod: S$GLB,,, | Performed by: OBSTETRICS & GYNECOLOGY

## 2022-10-28 PROCEDURE — 1126F AMNT PAIN NOTED NONE PRSNT: CPT | Mod: CPTII,S$GLB,, | Performed by: OBSTETRICS & GYNECOLOGY

## 2022-10-28 PROCEDURE — 99999 PR PBB SHADOW E&M-EST. PATIENT-LVL III: CPT | Mod: PBBFAC,,, | Performed by: OBSTETRICS & GYNECOLOGY

## 2022-10-28 PROCEDURE — 3044F PR MOST RECENT HEMOGLOBIN A1C LEVEL <7.0%: ICD-10-PCS | Mod: CPTII,S$GLB,, | Performed by: OBSTETRICS & GYNECOLOGY

## 2022-10-28 PROCEDURE — 1159F PR MEDICATION LIST DOCUMENTED IN MEDICAL RECORD: ICD-10-PCS | Mod: CPTII,S$GLB,, | Performed by: OBSTETRICS & GYNECOLOGY

## 2022-10-28 PROCEDURE — 1160F RVW MEDS BY RX/DR IN RCRD: CPT | Mod: CPTII,S$GLB,, | Performed by: OBSTETRICS & GYNECOLOGY

## 2022-10-28 NOTE — PROGRESS NOTES
Alicia Toussaint is a 72 y.o. year old  female who presents for routine GYN exam.  She is postmenopausal, not on hormones.  Denies bleeding, hot flashes, and night sweats.  Recent mammogram was negative.  No gyn complaints.    10/25/22 Mammogram: Negative, TC 2.23    10/20/21 BMD: Osteopenia (Spine T-2.3, Hip T-1.3, T-1.2)    20 Pap: Negative, HPV: Negative        Past Medical History:   Diagnosis Date    Abnormal weight loss 2017    Arthritis 2018    Complication of anesthesia     colonoscopy-cardiac arrest from anes    Elevated TSH 2017    Hypercholesteremia 2017    Osteoporosis, post-menopausal     Vitamin D deficiency 2017       Past Surgical History:   Procedure Laterality Date    COLONOSCOPY  2016    KNEE ARTHROPLASTY  1985    TUBAL LIGATION         OB History          2    Para   2    Term   2            AB        Living   2         SAB        IAB        Ectopic        Multiple        Live Births   2                   ROS:  GENERAL: Feeling well overall.   SKIN: Denies rash or lesions.   HEAD: Denies head injury or headache.   NODES: Denies enlarged lymph nodes.   CHEST: Denies chest pain or shortness of breath.   CARDIOVASCULAR: Denies palpitations or left sided chest pain.   ABDOMEN: No abdominal pain, nausea, vomiting or rectal bleeding.   URINARY: No dysuria or hematuria.  REPRODUCTIVE: See HPI.   BREASTS: Denies pain, lumps, or nipple discharge.   HEMATOLOGIC: No easy bruisability or excessive bleeding.   MUSCULOSKELETAL: Reports some joint pain.  NEUROLOGIC: Denies syncope or weakness.   PSYCHIATRIC: Denies depression.     PE:   (chaperone present during entire exam)  APPEARANCE: Well nourished, well developed, in no acute distress.  BREASTS: Symmetrical, no skin changes or visible lesions. No palpable masses, nipple discharge or adenopathy bilaterally.  ABDOMEN: Soft. No tenderness or masses.   VULVA: Atrophic. No lesions. Normal female  genitalia.  URETHRAL MEATUS: Normal size and location, no lesions, no prolapse.  URETHRA: No masses, tenderness, prolapse or scarring.  VAGINA: Atrophic.  No lesions, no abnormal discharge, no significant cystocele or rectocele.  CERVIX: No lesions and discharge.   UTERUS: Normal size, regular shape, mobile, non-tender, bladder base nontender.  ADNEXA: No masses, tenderness or CDS nodularity.  ANUS PERINEUM: Normal.    Diagnosis:  1. Women's annual routine gynecological examination    2. Postmenopausal status    3. Osteopenia of multiple sites          PLAN:         Patient was counseled today on postmenopausal issues.  We discussed her BMD from last year showing osteopenia.    Follow-up in 1 year.

## 2022-11-15 ENCOUNTER — PES CALL (OUTPATIENT)
Dept: ADMINISTRATIVE | Facility: CLINIC | Age: 72
End: 2022-11-15
Payer: MEDICARE

## 2022-11-19 ENCOUNTER — NURSE TRIAGE (OUTPATIENT)
Dept: ADMINISTRATIVE | Facility: CLINIC | Age: 72
End: 2022-11-19
Payer: MEDICARE

## 2022-11-19 NOTE — TELEPHONE ENCOUNTER
Pt states cough, lost voice, no fever. Non productive cough. Pt denies SOB or difficulty breathing. Pt asking for antibiotics to be called in. Per care advice, advised to see PCP within 24 hours. Discussed ed/uc and OAC. Also discussed home care advice, Advised pt to call back for any further questions or concerns.   Reason for Disposition   [1] Continuous (nonstop) coughing interferes with work or school AND [2] no improvement using cough treatment per Care Advice    Additional Information   Negative: SEVERE difficulty breathing (e.g., struggling for each breath, speaks in single words)   Negative: Bluish (or gray) lips or face now   Negative: [1] Rapid onset of cough AND [2] has hives   Negative: Coughing started suddenly after medicine, an allergic food or bee sting   Negative: [1] Difficulty breathing AND [2] exposure to flames, smoke, or fumes   Negative: [1] Stridor AND [2] difficulty breathing   Negative: Sounds like a life-threatening emergency to the triager   Negative: [1] MODERATE difficulty breathing (e.g., speaks in phrases, SOB even at rest, pulse 100-120) AND [2] still present when not coughing   Negative: Chest pain  (Exception: MILD central chest pain, present only when coughing)   Negative: Patient sounds very sick or weak to the triager   Negative: [1] MILD difficulty breathing (e.g., minimal/no SOB at rest, SOB with walking, pulse <100) AND [2] still present when not coughing   Negative: [1] Coughed up blood AND [2] > 1 tablespoon (15 ml)  (Exception: Blood-tinged sputum.)   Negative: [1] Fever > 101 F (38.3 C) AND [2] age > 60 years   Negative: Fever > 103 F (39.4 C)   Negative: [1] Fever > 100.0 F (37.8 C) AND [2] bedridden (e.g., nursing home patient, CVA, chronic illness, recovering from surgery)   Negative: [1] Fever > 100.0 F (37.8 C) AND [2] diabetes mellitus or weak immune system (e.g., HIV positive, cancer chemo, splenectomy, organ transplant, chronic steroids)   Negative: Wheezing is  present   Negative: [1] Ankle swelling AND [2] swelling is increasing   Negative: SEVERE coughing spells (e.g., whooping sound after coughing, vomiting after coughing)    Protocols used: Cough - Acute Non-Productive-A-AH

## 2022-12-06 ENCOUNTER — PES CALL (OUTPATIENT)
Dept: ADMINISTRATIVE | Facility: CLINIC | Age: 72
End: 2022-12-06
Payer: MEDICARE

## 2023-01-27 ENCOUNTER — PES CALL (OUTPATIENT)
Dept: ADMINISTRATIVE | Facility: CLINIC | Age: 73
End: 2023-01-27
Payer: MEDICARE

## 2023-02-07 DIAGNOSIS — Z00.00 ENCOUNTER FOR MEDICARE ANNUAL WELLNESS EXAM: ICD-10-CM

## 2023-02-09 DIAGNOSIS — Z00.00 ENCOUNTER FOR MEDICARE ANNUAL WELLNESS EXAM: ICD-10-CM

## 2023-02-27 ENCOUNTER — TELEPHONE (OUTPATIENT)
Dept: ADMINISTRATIVE | Facility: CLINIC | Age: 73
End: 2023-02-27
Payer: MEDICARE

## 2023-02-27 ENCOUNTER — PATIENT MESSAGE (OUTPATIENT)
Dept: ADMINISTRATIVE | Facility: CLINIC | Age: 73
End: 2023-02-27
Payer: MEDICARE

## 2023-02-27 NOTE — TELEPHONE ENCOUNTER
Called pt; left message with pt's family member to have pt return my call in regards to upcoming appt; I was calling to confirm pt's virtual EAWV on 2/28/23 at 8:00am and to see if she needed any help with setting up for virtual appt or e-pre check and to login 15 minutes prior to scheduled appt; left my name & number with pt's family member to have pt return my call; sent message through portal

## 2023-02-28 ENCOUNTER — OFFICE VISIT (OUTPATIENT)
Dept: INTERNAL MEDICINE | Facility: CLINIC | Age: 73
End: 2023-02-28
Payer: MEDICARE

## 2023-02-28 ENCOUNTER — TELEPHONE (OUTPATIENT)
Dept: ADMINISTRATIVE | Facility: CLINIC | Age: 73
End: 2023-02-28
Payer: MEDICARE

## 2023-02-28 DIAGNOSIS — Z11.59 ENCOUNTER FOR HEPATITIS C SCREENING TEST FOR LOW RISK PATIENT: ICD-10-CM

## 2023-02-28 DIAGNOSIS — Z00.00 ENCOUNTER FOR PREVENTIVE HEALTH EXAMINATION: Primary | ICD-10-CM

## 2023-02-28 DIAGNOSIS — R79.89 ELEVATED TSH: ICD-10-CM

## 2023-02-28 DIAGNOSIS — E55.9 VITAMIN D DEFICIENCY: ICD-10-CM

## 2023-02-28 DIAGNOSIS — M19.90 ARTHRITIS: ICD-10-CM

## 2023-02-28 DIAGNOSIS — M81.0 OSTEOPOROSIS, POST-MENOPAUSAL: ICD-10-CM

## 2023-02-28 DIAGNOSIS — R73.03 PREDIABETES: ICD-10-CM

## 2023-02-28 DIAGNOSIS — E78.00 HYPERCHOLESTEREMIA: ICD-10-CM

## 2023-02-28 DIAGNOSIS — M81.0 AGE-RELATED OSTEOPOROSIS WITHOUT CURRENT PATHOLOGICAL FRACTURE: ICD-10-CM

## 2023-02-28 PROCEDURE — G0439 PPPS, SUBSEQ VISIT: HCPCS | Mod: HCNC,95,, | Performed by: NURSE PRACTITIONER

## 2023-02-28 PROCEDURE — 1159F PR MEDICATION LIST DOCUMENTED IN MEDICAL RECORD: ICD-10-PCS | Mod: HCNC,CPTII,95, | Performed by: NURSE PRACTITIONER

## 2023-02-28 PROCEDURE — 3288F FALL RISK ASSESSMENT DOCD: CPT | Mod: HCNC,CPTII,95, | Performed by: NURSE PRACTITIONER

## 2023-02-28 PROCEDURE — 1170F PR FUNCTIONAL STATUS ASSESSED: ICD-10-PCS | Mod: HCNC,CPTII,95, | Performed by: NURSE PRACTITIONER

## 2023-02-28 PROCEDURE — 1101F PT FALLS ASSESS-DOCD LE1/YR: CPT | Mod: HCNC,CPTII,95, | Performed by: NURSE PRACTITIONER

## 2023-02-28 PROCEDURE — 1101F PR PT FALLS ASSESS DOC 0-1 FALLS W/OUT INJ PAST YR: ICD-10-PCS | Mod: HCNC,CPTII,95, | Performed by: NURSE PRACTITIONER

## 2023-02-28 PROCEDURE — 1160F PR REVIEW ALL MEDS BY PRESCRIBER/CLIN PHARMACIST DOCUMENTED: ICD-10-PCS | Mod: HCNC,CPTII,95, | Performed by: NURSE PRACTITIONER

## 2023-02-28 PROCEDURE — 3288F PR FALLS RISK ASSESSMENT DOCUMENTED: ICD-10-PCS | Mod: HCNC,CPTII,95, | Performed by: NURSE PRACTITIONER

## 2023-02-28 PROCEDURE — 1159F MED LIST DOCD IN RCRD: CPT | Mod: HCNC,CPTII,95, | Performed by: NURSE PRACTITIONER

## 2023-02-28 PROCEDURE — 1170F FXNL STATUS ASSESSED: CPT | Mod: HCNC,CPTII,95, | Performed by: NURSE PRACTITIONER

## 2023-02-28 PROCEDURE — 1160F RVW MEDS BY RX/DR IN RCRD: CPT | Mod: HCNC,CPTII,95, | Performed by: NURSE PRACTITIONER

## 2023-02-28 PROCEDURE — G0439 PR MEDICARE ANNUAL WELLNESS SUBSEQUENT VISIT: ICD-10-PCS | Mod: HCNC,95,, | Performed by: NURSE PRACTITIONER

## 2023-02-28 NOTE — PROGRESS NOTES
I offered to discuss advanced care planning, including how to pick a person who would make decisions for you if you were unable to make them for yourself, called a health care power of , and what kind of decisions you might make such as use of life sustaining treatments such as ventilators and tube feeding when faced with a life limiting illness recorded on a living will that they will need to know. (How you want to be cared for as you near the end of your natural life)     X Patient is interested in learning more about how to make advanced directives.  I provided them paperwork and offered to discuss this with them.The patient location is: [place]  The chief complaint leading to consultation is: AWV    Visit type: audiovisual    Face to Face time with patient: Yes  30 minutes of total time spent on the encounter, which includes face to face time and non-face to face time preparing to see the patient (eg, review of tests), Obtaining and/or reviewing separately obtained history, Documenting clinical information in the electronic or other health record, Independently interpreting results (not separately reported) and communicating results to the patient/family/caregiver, or Care coordination (not separately reported).         Each patient to whom he or she provides medical services by telemedicine is:  (1) informed of the relationship between the physician and patient and the respective role of any other health care provider with respect to management of the patient; and (2) notified that he or she may decline to receive medical services by telemedicine and may withdraw from such care at any time.          Alicia Toussaint presented for a  Medicare AWV and comprehensive Health Risk Assessment today. The following components were reviewed and updated:    Medical history  Family History  Social history  Allergies and Current Medications  Health Risk Assessment  Health Maintenance  Care Team         ** See  Completed Assessments for Annual Wellness Visit within the encounter summary.**         The following assessments were completed:  Living Situation  CAGE  Depression Screening  Fall Risk Assessment (MACH 10)  Hearing Assessment(HHI)  Cognitive Function Screening  Nutrition Screening  ADL Screening  PAQ Screening      Constitutional: She is oriented to person, place, and time and well-developed, well-nourished, and in no distress. No distress.   HENT:   Head: Normocephalic and atraumatic.   Eyes: No scleral icterus.   Pulmonary/Chest: Effort normal. No respiratory distress.   Neurological: She is alert and oriented to person, place, and time.   Skin: She is not diaphoretic.   Psychiatric: Mood and affect normal.               Diagnoses and health risks identified today and associated recommendations/orders:    1. Encounter for preventive health examination  Annual Health Risk Assessment (HRA) visit today.  Counseling and referral of health maintenance and preventative health measures performed.  Patient given annual wellness paperwork to take home.  Encouraged to return in 1 year for subsequent HRA visit.     2. Prediabetes  Chronic. Stable. Last Hgb A1c=5.5 from 8/10/22. Continue current treatment plan as previously prescribed by PCP.    3. Age-related osteoporosis without current pathological fracture  Chronic. Stable. Continue current treatment plan as previously prescribed by PCP.    4. Vitamin D deficiency  Chronic. Stable. Continue current treatment plan as previously prescribed by PCP.    5. Encounter for hepatitis C screening test for low risk patient  Chronic. Stable. Continue current treatment plan as previously prescribed by PCP.    6. Hypercholesteremia  Chronic. Stable. Continue current treatment plan as previously prescribed by PCP.    7. Arthritis  Chronic. Stable. Continue current treatment plan as previously prescribed by PCP.    8. Osteoporosis, post-menopausal  Chronic. Stable. Continue current  treatment plan as previously prescribed by PCP.    9. Elevated TSH  Chronic. Stable. Continue current treatment plan as previously prescribed by PCP.        Provided Alicia with a 5-10 year written screening schedule and personal prevention plan. Recommendations were developed using the USPSTF age appropriate recommendations. Education, counseling, and referrals were provided as needed. After Visit Summary printed and given to patient which includes a list of additional screenings\tests needed.      I offered to discuss end of life issues, including information on how to make advance directives that the patient could use to name someone who would make medical decisions on their behalf if they became too ill to make themselves.    ___Patient declined  _X_Patient is interested, I provided paper work and offered to discuss.    Follow up in about 1 year (around 2/28/2024).    Lenny Jasmine NP

## 2023-02-28 NOTE — PATIENT INSTRUCTIONS
Counseling and Referral of Other Preventative  (Italic type indicates deductible and co-insurance are waived)    Patient Name: Alicia Toussaint  Today's Date: 2/28/2023    Health Maintenance       Date Due Completion Date    Shingles Vaccine (2 of 3) 11/06/2015 9/11/2015    Hemoglobin A1c (Prediabetes) 08/10/2023 8/10/2022    Lipid Panel 08/10/2023 8/10/2022    Mammogram 10/25/2023 10/25/2022    DEXA Scan 10/20/2024 10/20/2021    TETANUS VACCINE 10/05/2026 10/5/2016    Colorectal Cancer Screening 10/25/2026 10/25/2021    Override on 5/6/2009: Done        No orders of the defined types were placed in this encounter.    The following information is provided to all patients.  This information is to help you find resources for any of the problems found today that may be affecting your health:                Living healthy guide: www.[facility].[place].[facility]      Understanding Diabetes: www.diabetes.org      Eating healthy: www.cdc.gov/healthyweight      CDC home safety checklist: www.cdc.gov/steadi/patient.html      Agency on Aging: www.goea.[place].[facility]      Alcoholics anonymous (AA): www.aa.org      Physical Activity: www.[place].nih.gov/cy8awlj      Tobacco use: www.quitwithusla.org

## 2023-03-30 ENCOUNTER — TELEPHONE (OUTPATIENT)
Dept: INTERNAL MEDICINE | Facility: CLINIC | Age: 73
End: 2023-03-30
Payer: MEDICARE

## 2023-03-30 DIAGNOSIS — R79.9 ABNORMAL FINDING OF BLOOD CHEMISTRY, UNSPECIFIED: ICD-10-CM

## 2023-03-30 DIAGNOSIS — E78.00 HYPERCHOLESTEREMIA: ICD-10-CM

## 2023-03-30 DIAGNOSIS — Z00.00 ENCOUNTER FOR PREVENTIVE HEALTH EXAMINATION: Primary | ICD-10-CM

## 2023-03-30 DIAGNOSIS — R79.89 ELEVATED TSH: ICD-10-CM

## 2023-03-30 NOTE — TELEPHONE ENCOUNTER
----- Message from Diann Andino sent at 3/29/2023 12:48 PM CDT -----  Regarding: labs  Name of caller: polo       What is the requesting detail: pt is requesting an order for blood work placed before her 5-9 appt.       Can the clinic reply by [facility]: yes       What number to call back:[phone]

## 2023-05-02 ENCOUNTER — LAB VISIT (OUTPATIENT)
Dept: LAB | Facility: OTHER | Age: 73
End: 2023-05-02
Attending: FAMILY MEDICINE
Payer: MEDICARE

## 2023-05-02 DIAGNOSIS — R79.89 ELEVATED TSH: ICD-10-CM

## 2023-05-02 DIAGNOSIS — Z00.00 ENCOUNTER FOR PREVENTIVE HEALTH EXAMINATION: ICD-10-CM

## 2023-05-02 DIAGNOSIS — E78.00 HYPERCHOLESTEREMIA: ICD-10-CM

## 2023-05-02 DIAGNOSIS — R79.9 ABNORMAL FINDING OF BLOOD CHEMISTRY, UNSPECIFIED: ICD-10-CM

## 2023-05-02 LAB
ALBUMIN SERPL BCP-MCNC: 3.8 G/DL (ref 3.5–5.2)
ALP SERPL-CCNC: 55 U/L (ref 55–135)
ALT SERPL W/O P-5'-P-CCNC: 24 U/L (ref 10–44)
ANION GAP SERPL CALC-SCNC: 9 MMOL/L (ref 8–16)
AST SERPL-CCNC: 26 U/L (ref 10–40)
BILIRUB SERPL-MCNC: 0.5 MG/DL (ref 0.1–1)
BUN SERPL-MCNC: 25 MG/DL (ref 8–23)
CALCIUM SERPL-MCNC: 9.3 MG/DL (ref 8.7–10.5)
CHLORIDE SERPL-SCNC: 106 MMOL/L (ref 95–110)
CHOLEST SERPL-MCNC: 191 MG/DL (ref 120–199)
CHOLEST/HDLC SERPL: 3.1 {RATIO} (ref 2–5)
CO2 SERPL-SCNC: 25 MMOL/L (ref 23–29)
CREAT SERPL-MCNC: 0.8 MG/DL (ref 0.5–1.4)
EST. GFR  (NO RACE VARIABLE): >60 ML/MIN/1.73 M^2
ESTIMATED AVG GLUCOSE: 117 MG/DL (ref 68–131)
GLUCOSE SERPL-MCNC: 94 MG/DL (ref 70–110)
HBA1C MFR BLD: 5.7 % (ref 4–5.6)
HDLC SERPL-MCNC: 61 MG/DL (ref 40–75)
HDLC SERPL: 31.9 % (ref 20–50)
LDLC SERPL CALC-MCNC: 117 MG/DL (ref 63–159)
NONHDLC SERPL-MCNC: 130 MG/DL
POTASSIUM SERPL-SCNC: 3.9 MMOL/L (ref 3.5–5.1)
PROT SERPL-MCNC: 6.9 G/DL (ref 6–8.4)
SODIUM SERPL-SCNC: 140 MMOL/L (ref 136–145)
TRIGL SERPL-MCNC: 65 MG/DL (ref 30–150)
TSH SERPL DL<=0.005 MIU/L-ACNC: 3.34 UIU/ML (ref 0.4–4)

## 2023-05-02 PROCEDURE — 83036 HEMOGLOBIN GLYCOSYLATED A1C: CPT | Mod: HCNC | Performed by: FAMILY MEDICINE

## 2023-05-02 PROCEDURE — 80061 LIPID PANEL: CPT | Mod: HCNC | Performed by: FAMILY MEDICINE

## 2023-05-02 PROCEDURE — 80053 COMPREHEN METABOLIC PANEL: CPT | Mod: HCNC | Performed by: FAMILY MEDICINE

## 2023-05-02 PROCEDURE — 36415 COLL VENOUS BLD VENIPUNCTURE: CPT | Mod: HCNC | Performed by: FAMILY MEDICINE

## 2023-05-02 PROCEDURE — 84443 ASSAY THYROID STIM HORMONE: CPT | Mod: HCNC | Performed by: FAMILY MEDICINE

## 2023-05-09 ENCOUNTER — OFFICE VISIT (OUTPATIENT)
Dept: INTERNAL MEDICINE | Facility: CLINIC | Age: 73
End: 2023-05-09
Attending: FAMILY MEDICINE
Payer: MEDICARE

## 2023-05-09 VITALS
HEART RATE: 73 BPM | OXYGEN SATURATION: 99 % | WEIGHT: 103.19 LBS | BODY MASS INDEX: 20.8 KG/M2 | SYSTOLIC BLOOD PRESSURE: 140 MMHG | HEIGHT: 59 IN | DIASTOLIC BLOOD PRESSURE: 70 MMHG

## 2023-05-09 DIAGNOSIS — E78.00 HYPERCHOLESTEREMIA: ICD-10-CM

## 2023-05-09 DIAGNOSIS — M25.521 RIGHT ELBOW PAIN: Primary | ICD-10-CM

## 2023-05-09 PROCEDURE — 3077F PR MOST RECENT SYSTOLIC BLOOD PRESSURE >= 140 MM HG: ICD-10-PCS | Mod: HCNC,CPTII,S$GLB, | Performed by: FAMILY MEDICINE

## 2023-05-09 PROCEDURE — 99214 OFFICE O/P EST MOD 30 MIN: CPT | Mod: HCNC,S$GLB,, | Performed by: FAMILY MEDICINE

## 2023-05-09 PROCEDURE — 1159F PR MEDICATION LIST DOCUMENTED IN MEDICAL RECORD: ICD-10-PCS | Mod: HCNC,CPTII,S$GLB, | Performed by: FAMILY MEDICINE

## 2023-05-09 PROCEDURE — 1101F PR PT FALLS ASSESS DOC 0-1 FALLS W/OUT INJ PAST YR: ICD-10-PCS | Mod: HCNC,CPTII,S$GLB, | Performed by: FAMILY MEDICINE

## 2023-05-09 PROCEDURE — 1125F AMNT PAIN NOTED PAIN PRSNT: CPT | Mod: HCNC,CPTII,S$GLB, | Performed by: FAMILY MEDICINE

## 2023-05-09 PROCEDURE — 3288F PR FALLS RISK ASSESSMENT DOCUMENTED: ICD-10-PCS | Mod: HCNC,CPTII,S$GLB, | Performed by: FAMILY MEDICINE

## 2023-05-09 PROCEDURE — 1159F MED LIST DOCD IN RCRD: CPT | Mod: HCNC,CPTII,S$GLB, | Performed by: FAMILY MEDICINE

## 2023-05-09 PROCEDURE — 3044F HG A1C LEVEL LT 7.0%: CPT | Mod: HCNC,CPTII,S$GLB, | Performed by: FAMILY MEDICINE

## 2023-05-09 PROCEDURE — 3008F BODY MASS INDEX DOCD: CPT | Mod: HCNC,CPTII,S$GLB, | Performed by: FAMILY MEDICINE

## 2023-05-09 PROCEDURE — 3077F SYST BP >= 140 MM HG: CPT | Mod: HCNC,CPTII,S$GLB, | Performed by: FAMILY MEDICINE

## 2023-05-09 PROCEDURE — 99999 PR PBB SHADOW E&M-EST. PATIENT-LVL IV: ICD-10-PCS | Mod: PBBFAC,HCNC,, | Performed by: FAMILY MEDICINE

## 2023-05-09 PROCEDURE — 3008F PR BODY MASS INDEX (BMI) DOCUMENTED: ICD-10-PCS | Mod: HCNC,CPTII,S$GLB, | Performed by: FAMILY MEDICINE

## 2023-05-09 PROCEDURE — 3078F PR MOST RECENT DIASTOLIC BLOOD PRESSURE < 80 MM HG: ICD-10-PCS | Mod: HCNC,CPTII,S$GLB, | Performed by: FAMILY MEDICINE

## 2023-05-09 PROCEDURE — 3288F FALL RISK ASSESSMENT DOCD: CPT | Mod: HCNC,CPTII,S$GLB, | Performed by: FAMILY MEDICINE

## 2023-05-09 PROCEDURE — 3044F PR MOST RECENT HEMOGLOBIN A1C LEVEL <7.0%: ICD-10-PCS | Mod: HCNC,CPTII,S$GLB, | Performed by: FAMILY MEDICINE

## 2023-05-09 PROCEDURE — 99999 PR PBB SHADOW E&M-EST. PATIENT-LVL IV: CPT | Mod: PBBFAC,HCNC,, | Performed by: FAMILY MEDICINE

## 2023-05-09 PROCEDURE — 3078F DIAST BP <80 MM HG: CPT | Mod: HCNC,CPTII,S$GLB, | Performed by: FAMILY MEDICINE

## 2023-05-09 PROCEDURE — 1101F PT FALLS ASSESS-DOCD LE1/YR: CPT | Mod: HCNC,CPTII,S$GLB, | Performed by: FAMILY MEDICINE

## 2023-05-09 PROCEDURE — 1125F PR PAIN SEVERITY QUANTIFIED, PAIN PRESENT: ICD-10-PCS | Mod: HCNC,CPTII,S$GLB, | Performed by: FAMILY MEDICINE

## 2023-05-09 PROCEDURE — 1160F PR REVIEW ALL MEDS BY PRESCRIBER/CLIN PHARMACIST DOCUMENTED: ICD-10-PCS | Mod: HCNC,CPTII,S$GLB, | Performed by: FAMILY MEDICINE

## 2023-05-09 PROCEDURE — 99214 PR OFFICE/OUTPT VISIT, EST, LEVL IV, 30-39 MIN: ICD-10-PCS | Mod: HCNC,S$GLB,, | Performed by: FAMILY MEDICINE

## 2023-05-09 PROCEDURE — 1160F RVW MEDS BY RX/DR IN RCRD: CPT | Mod: HCNC,CPTII,S$GLB, | Performed by: FAMILY MEDICINE

## 2023-07-12 ENCOUNTER — TELEPHONE (OUTPATIENT)
Dept: OBSTETRICS AND GYNECOLOGY | Facility: CLINIC | Age: 73
End: 2023-07-12
Payer: MEDICARE

## 2023-07-12 DIAGNOSIS — Z12.31 SCREENING MAMMOGRAM FOR BREAST CANCER: Primary | ICD-10-CM

## 2023-07-12 NOTE — TELEPHONE ENCOUNTER
----- Message from Jn Miller sent at 7/12/2023  3:45 PM CDT -----  Name of Who is Calling:JACQUELINE JUNE [[id number]]             What is the request in detail: Pt states she needs an order for a mammogram placed in the system. Please contact to further discuss and advise.              Can the clinic reply by [facility]: No             What Number to Call Back if not in [facility]:  [phone]

## 2023-07-26 ENCOUNTER — TELEPHONE (OUTPATIENT)
Dept: OBSTETRICS AND GYNECOLOGY | Facility: CLINIC | Age: 73
End: 2023-07-26
Payer: MEDICARE

## 2023-07-26 NOTE — TELEPHONE ENCOUNTER
----- Message from Talia Stockton sent at 7/26/2023  2:15 PM CDT -----  Contact: JACQUELINE JUNE [[id number]]  Type: Call Back      Who called: JACQUELINE JUNE [[id number]]      What is the request in detail: Patient is requesting a call back. She would like to know if she is due for her bone density scan.   Please advise.     Can the clinic reply by [facility]? No      Would the patient rather a call back or a response via [facility]? Call back       Best call back number: [phone]      Additional Information:

## 2023-07-27 ENCOUNTER — TELEPHONE (OUTPATIENT)
Dept: OBSTETRICS AND GYNECOLOGY | Facility: CLINIC | Age: 73
End: 2023-07-27
Payer: MEDICARE

## 2023-07-27 DIAGNOSIS — Z78.0 POST-MENOPAUSAL: Primary | ICD-10-CM

## 2023-07-27 NOTE — TELEPHONE ENCOUNTER
Reached out to pt to schedule bone density approved by . Pt stated she is currently at work and will call back tomorrow 7/28 to schedule appointment.

## 2023-08-16 ENCOUNTER — HOSPITAL ENCOUNTER (EMERGENCY)
Facility: OTHER | Age: 73
Discharge: HOME | End: 2023-08-16
Attending: EMERGENCY MEDICINE
Payer: MEDICARE

## 2023-08-16 VITALS
OXYGEN SATURATION: 100 % | BODY MASS INDEX: 21.77 KG/M2 | HEART RATE: 68 BPM | TEMPERATURE: 98 F | RESPIRATION RATE: 16 BRPM | HEIGHT: 59 IN | DIASTOLIC BLOOD PRESSURE: 68 MMHG | SYSTOLIC BLOOD PRESSURE: 158 MMHG | WEIGHT: 108 LBS

## 2023-08-16 DIAGNOSIS — M79.602 LEFT ARM PAIN: ICD-10-CM
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
## 2023-08-16 DIAGNOSIS — M25.522 LEFT ELBOW PAIN: Primary | ICD-10-CM

## 2023-08-16 PROCEDURE — 99283 EMERGENCY DEPT VISIT LOW MDM: CPT | Mod: HCNC
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
## 2023-10-27 ENCOUNTER — HOSPITAL ENCOUNTER (OUTPATIENT)
Dept: RADIOLOGY | Facility: OTHER | Age: 73
Discharge: HOME | End: 2023-10-27
Attending: OBSTETRICS & GYNECOLOGY
Payer: MEDICARE

## 2023-10-27 ENCOUNTER — TELEPHONE (OUTPATIENT)
Dept: OBSTETRICS AND GYNECOLOGY | Facility: CLINIC | Age: 73
End: 2023-10-27
Payer: MEDICARE

## 2023-10-27 DIAGNOSIS — Z12.31 SCREENING MAMMOGRAM FOR BREAST CANCER: ICD-10-CM

## 2023-10-27 DIAGNOSIS — Z78.0 POST-MENOPAUSAL: ICD-10-CM

## 2023-10-27 PROCEDURE — 77080 DXA BONE DENSITY AXIAL: CPT | Mod: TC,HCNC

## 2023-10-27 PROCEDURE — 77080 DXA BONE DENSITY AXIAL: CPT | Mod: 26,HCNC,, | Performed by: RADIOLOGY

## 2023-10-27 PROCEDURE — 77063 BREAST TOMOSYNTHESIS BI: CPT | Mod: 26,HCNC,, | Performed by: RADIOLOGY

## 2023-10-27 PROCEDURE — 77067 SCR MAMMO BI INCL CAD: CPT | Mod: 26,HCNC,, | Performed by: RADIOLOGY

## 2023-10-27 PROCEDURE — 77067 SCR MAMMO BI INCL CAD: CPT | Mod: TC,HCNC

## 2023-10-27 PROCEDURE — 77063 MAMMO DIGITAL SCREENING BILAT WITH TOMO: ICD-10-PCS | Mod: 26,HCNC,, | Performed by: RADIOLOGY

## 2023-10-27 PROCEDURE — 77067 MAMMO DIGITAL SCREENING BILAT WITH TOMO: ICD-10-PCS | Mod: 26,HCNC,, | Performed by: RADIOLOGY

## 2023-10-27 PROCEDURE — 77080 DXA BONE DENSITY AXIAL SKELETON 1 OR MORE SITES: ICD-10-PCS | Mod: 26,HCNC,, | Performed by: RADIOLOGY

## 2023-10-27 NOTE — TELEPHONE ENCOUNTER
Called patient:    Discussed results of BMD:    FINDINGS:  The bone mineral density measured from L1 through L4 is 0.897g/cm2.  This corresponds to a T score of -2.4 and a Z score of -0.2.  No significant interval change.  The bone mineral density within the left femoral neck measures 0.839 g/cm2.  This corresponds to a T score of -1.4 and a Z score of 0.7.  The bone mineral density within the right femoral neck measures 0.80 g/cm2.  This corresponds to a T score of -1.1 and a Z score of 1.0.  No significant interval change.  FRAX RESULTS:  10-year Probability of Fracture:  Major Osteoporotic Fracture 5.3%.  Hip Fracture 0.8%.  Impression:  Osteopenia lumbar spine and both hips with no significant interval change.    We discussed the recommendation for continued adequate calcium and vitamin D  Repeat BMD in 2 years.

## 2023-10-31 ENCOUNTER — OFFICE VISIT (OUTPATIENT)
Dept: OBSTETRICS AND GYNECOLOGY | Facility: CLINIC | Age: 73
End: 2023-10-31
Attending: OBSTETRICS & GYNECOLOGY
Payer: MEDICARE

## 2023-10-31 VITALS
WEIGHT: 96.56 LBS | BODY MASS INDEX: 19.47 KG/M2 | HEIGHT: 59 IN | DIASTOLIC BLOOD PRESSURE: 68 MMHG | SYSTOLIC BLOOD PRESSURE: 120 MMHG

## 2023-10-31 DIAGNOSIS — M85.89 OSTEOPENIA OF MULTIPLE SITES: ICD-10-CM

## 2023-10-31 DIAGNOSIS — Z78.0 POST-MENOPAUSAL: ICD-10-CM

## 2023-10-31 DIAGNOSIS — Z12.4 PAP SMEAR FOR CERVICAL CANCER SCREENING: ICD-10-CM

## 2023-10-31 DIAGNOSIS — Z01.419 WOMEN'S ANNUAL ROUTINE GYNECOLOGICAL EXAMINATION: Primary | ICD-10-CM

## 2023-10-31 PROCEDURE — 1160F RVW MEDS BY RX/DR IN RCRD: CPT | Mod: HCNC,CPTII,S$GLB, | Performed by: OBSTETRICS & GYNECOLOGY

## 2023-10-31 PROCEDURE — 88175 CYTOPATH C/V AUTO FLUID REDO: CPT | Mod: HCNC | Performed by: OBSTETRICS & GYNECOLOGY

## 2023-10-31 PROCEDURE — 3078F PR MOST RECENT DIASTOLIC BLOOD PRESSURE < 80 MM HG: ICD-10-PCS | Mod: HCNC,CPTII,S$GLB, | Performed by: OBSTETRICS & GYNECOLOGY

## 2023-10-31 PROCEDURE — 1159F PR MEDICATION LIST DOCUMENTED IN MEDICAL RECORD: ICD-10-PCS | Mod: HCNC,CPTII,S$GLB, | Performed by: OBSTETRICS & GYNECOLOGY

## 2023-10-31 PROCEDURE — 3044F PR MOST RECENT HEMOGLOBIN A1C LEVEL <7.0%: ICD-10-PCS | Mod: HCNC,CPTII,S$GLB, | Performed by: OBSTETRICS & GYNECOLOGY

## 2023-10-31 PROCEDURE — 1160F PR REVIEW ALL MEDS BY PRESCRIBER/CLIN PHARMACIST DOCUMENTED: ICD-10-PCS | Mod: HCNC,CPTII,S$GLB, | Performed by: OBSTETRICS & GYNECOLOGY

## 2023-10-31 PROCEDURE — 99999 PR PBB SHADOW E&M-EST. PATIENT-LVL III: CPT | Mod: PBBFAC,HCNC,, | Performed by: OBSTETRICS & GYNECOLOGY

## 2023-10-31 PROCEDURE — 3074F SYST BP LT 130 MM HG: CPT | Mod: HCNC,CPTII,S$GLB, | Performed by: OBSTETRICS & GYNECOLOGY

## 2023-10-31 PROCEDURE — 1101F PR PT FALLS ASSESS DOC 0-1 FALLS W/OUT INJ PAST YR: ICD-10-PCS | Mod: HCNC,CPTII,S$GLB, | Performed by: OBSTETRICS & GYNECOLOGY

## 2023-10-31 PROCEDURE — 99999 PR PBB SHADOW E&M-EST. PATIENT-LVL III: ICD-10-PCS | Mod: PBBFAC,HCNC,, | Performed by: OBSTETRICS & GYNECOLOGY

## 2023-10-31 PROCEDURE — 3288F PR FALLS RISK ASSESSMENT DOCUMENTED: ICD-10-PCS | Mod: HCNC,CPTII,S$GLB, | Performed by: OBSTETRICS & GYNECOLOGY

## 2023-10-31 PROCEDURE — 3044F HG A1C LEVEL LT 7.0%: CPT | Mod: HCNC,CPTII,S$GLB, | Performed by: OBSTETRICS & GYNECOLOGY

## 2023-10-31 PROCEDURE — G0101 PR CA SCREEN;PELVIC/BREAST EXAM: ICD-10-PCS | Mod: HCNC,S$GLB,, | Performed by: OBSTETRICS & GYNECOLOGY

## 2023-10-31 PROCEDURE — 3078F DIAST BP <80 MM HG: CPT | Mod: HCNC,CPTII,S$GLB, | Performed by: OBSTETRICS & GYNECOLOGY

## 2023-10-31 PROCEDURE — 1126F PR PAIN SEVERITY QUANTIFIED, NO PAIN PRESENT: ICD-10-PCS | Mod: HCNC,CPTII,S$GLB, | Performed by: OBSTETRICS & GYNECOLOGY

## 2023-10-31 PROCEDURE — 1126F AMNT PAIN NOTED NONE PRSNT: CPT | Mod: HCNC,CPTII,S$GLB, | Performed by: OBSTETRICS & GYNECOLOGY

## 2023-10-31 PROCEDURE — 3074F PR MOST RECENT SYSTOLIC BLOOD PRESSURE < 130 MM HG: ICD-10-PCS | Mod: HCNC,CPTII,S$GLB, | Performed by: OBSTETRICS & GYNECOLOGY

## 2023-10-31 PROCEDURE — 1159F MED LIST DOCD IN RCRD: CPT | Mod: HCNC,CPTII,S$GLB, | Performed by: OBSTETRICS & GYNECOLOGY

## 2023-10-31 PROCEDURE — G0101 CA SCREEN;PELVIC/BREAST EXAM: HCPCS | Mod: HCNC,S$GLB,, | Performed by: OBSTETRICS & GYNECOLOGY

## 2023-10-31 PROCEDURE — 1101F PT FALLS ASSESS-DOCD LE1/YR: CPT | Mod: HCNC,CPTII,S$GLB, | Performed by: OBSTETRICS & GYNECOLOGY

## 2023-10-31 PROCEDURE — 3288F FALL RISK ASSESSMENT DOCD: CPT | Mod: HCNC,CPTII,S$GLB, | Performed by: OBSTETRICS & GYNECOLOGY
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
## 2023-11-02 ENCOUNTER — PATIENT MESSAGE (OUTPATIENT)
Dept: OBSTETRICS AND GYNECOLOGY | Facility: CLINIC | Age: 73
End: 2023-11-02
Payer: MEDICARE

## 2023-11-08 LAB
FINAL PATHOLOGIC DIAGNOSIS: NORMAL
Lab: NORMAL

## 2023-11-10 ENCOUNTER — PATIENT MESSAGE (OUTPATIENT)
Dept: OBSTETRICS AND GYNECOLOGY | Facility: CLINIC | Age: 73
End: 2023-11-10
Payer: MEDICARE

## 2024-02-01 ENCOUNTER — NURSE TRIAGE (OUTPATIENT)
Dept: ADMINISTRATIVE | Facility: CLINIC | Age: 74
End: 2024-02-01
Payer: MEDICARE

## 2024-02-01 NOTE — TELEPHONE ENCOUNTER
"[place]    PCP:  Dr. Ranjit Sweeney    She reports diarrhea on Tuesday morning.  She states didn't really eat that much the last 2 days.  C/O lower mild abdominal cramping, generalized weakness, and severe watery diarrhea.  She reports has been taking Pepto-bismol tablets and stool is dark.  Denies taking antibiotics, dizziness, blood in stool, nausea, back pain, urination pain, vomiting, and fever.  She reports drinking water.  She last urinated at 5p; she reports urinated a small amount.  Per protocol, care advised is go to [facility]/ED now.  Pt VU and refused care advised.  Care advice reinforced but she continues to refuse care advice.  She states that she will see how she feels and go from there.  Advised to call for worsening/questions/concerns.  VU.    Reason for Disposition   [1] MILD pain (e.g., does not interfere with normal activities) AND [2] pain comes and goes (cramps) AND [3] present > 48 hours  (Exception: This same abdominal pain is a chronic symptom recurrent or ongoing AND present > 4 weeks.)   [1] Drinking very little AND [2] dehydration suspected (e.g., no urine > 12 hours, very dry mouth, very lightheaded)    Additional Information   Negative: Shock suspected (e.g., cold/pale/clammy skin, too weak to stand, low BP, rapid pulse)   Negative: Difficult to awaken or acting confused (e.g., disoriented, slurred speech)   Negative: Passed out (i.e., lost consciousness, collapsed and was not responding)   Negative: Sounds like a life-threatening emergency to the triager   Negative: [1] SEVERE pain (e.g., excruciating) AND [2] present > 1 hour   Negative: [1] SEVERE pain AND [2] age > 60 years   Negative: [1] Vomiting AND [2] contains red blood or black ("coffee ground") material  (Exception: Few red streaks in vomit that only happened once.)   Negative: Blood in bowel movements  (Exception: Blood on surface of BM with constipation.)   Negative: Black or tarry bowel movements  (Exception: Chronic-unchanged " black-grey BMs AND is taking iron pills or Pepto-Bismol.)   Negative: [1] Vomiting AND [2] contains bile (green color)   Negative: Patient sounds very sick or weak to the triager   Negative: [1] MILD-MODERATE pain AND [2] constant AND [3] present > 2 hours   Negative: [1] Vomiting AND [2] abdomen looks much more swollen than usual   Negative: White of the eyes have turned yellow (i.e., jaundice)   Negative: Fever > 103 F (39.4 C)   Negative: [1] Fever > 101 F (38.3 C) AND [2] age > 60 years   Negative: [1] Fever > 100.0 F (37.8 C) AND [2] bedridden (e.g., CVA, chronic illness, recovering from surgery)   Negative: [1] Fever > 100.0 F (37.8 C) AND [2] diabetes mellitus or weak immune system (e.g., HIV positive, cancer chemo, splenectomy, organ transplant, chronic steroids)   Negative: [1] SEVERE pain AND [2] present < 1 hour   Negative: [1] MODERATE pain (e.g., interferes with normal activities) AND [2] pain comes and goes (cramps) AND [3] present > 24 hours  (Exception: Pain with Vomiting or Diarrhea - see that Guideline.)   Negative: Shock suspected (e.g., cold/pale/clammy skin, too weak to stand, low BP, rapid pulse)   Negative: Difficult to awaken or acting confused (e.g., disoriented, slurred speech)   Negative: Sounds like a life-threatening emergency to the triager   Negative: [1] SEVERE abdominal pain (e.g., excruciating) AND [2] present > 1 hour   Negative: [1] SEVERE abdominal pain AND [2] age > 60 years   Negative: [1] Blood in the stool AND [2] moderate or large amount of blood   Negative: Black or tarry bowel movements  (Exception: Chronic-unchanged black-grey BMs AND is taking iron pills or Pepto-Bismol.)    Protocols used: Abdominal Pain - Female-A-AH, Diarrhea-A-AH

## 2024-02-02 ENCOUNTER — TELEPHONE (OUTPATIENT)
Dept: INTERNAL MEDICINE | Facility: CLINIC | Age: 74
End: 2024-02-02
Payer: MEDICARE

## 2024-02-02 ENCOUNTER — OFFICE VISIT (OUTPATIENT)
Dept: INTERNAL MEDICINE | Facility: CLINIC | Age: 74
End: 2024-02-02
Payer: MEDICARE

## 2024-02-02 ENCOUNTER — NURSE TRIAGE (OUTPATIENT)
Dept: ADMINISTRATIVE | Facility: CLINIC | Age: 74
End: 2024-02-02
Payer: MEDICARE

## 2024-02-02 ENCOUNTER — HOSPITAL ENCOUNTER (OUTPATIENT)
Facility: OTHER | Age: 74
Discharge: HOME | End: 2024-02-04
Attending: INTERNAL MEDICINE | Admitting: HOSPITALIST
Payer: MEDICARE

## 2024-02-02 VITALS
HEART RATE: 74 BPM | DIASTOLIC BLOOD PRESSURE: 76 MMHG | BODY MASS INDEX: 17.64 KG/M2 | HEIGHT: 59 IN | WEIGHT: 87.5 LBS | SYSTOLIC BLOOD PRESSURE: 135 MMHG

## 2024-02-02 DIAGNOSIS — K52.9 ENTERITIS: Primary | ICD-10-CM

## 2024-02-02 DIAGNOSIS — F41.1 GAD (GENERALIZED ANXIETY DISORDER): Chronic | ICD-10-CM

## 2024-02-02 DIAGNOSIS — R19.7 ACUTE DIARRHEA: Primary | ICD-10-CM

## 2024-02-02 DIAGNOSIS — R63.4 UNINTENTIONAL WEIGHT LOSS: ICD-10-CM

## 2024-02-02 PROCEDURE — 1101F PT FALLS ASSESS-DOCD LE1/YR: CPT | Mod: HCNC,CPTII,S$GLB, | Performed by: STUDENT IN AN ORGANIZED HEALTH CARE EDUCATION/TRAINING PROGRAM

## 2024-02-02 PROCEDURE — 3075F SYST BP GE 130 - 139MM HG: CPT | Mod: HCNC,CPTII,S$GLB, | Performed by: STUDENT IN AN ORGANIZED HEALTH CARE EDUCATION/TRAINING PROGRAM

## 2024-02-02 PROCEDURE — 3078F DIAST BP <80 MM HG: CPT | Mod: HCNC,CPTII,S$GLB, | Performed by: STUDENT IN AN ORGANIZED HEALTH CARE EDUCATION/TRAINING PROGRAM

## 2024-02-02 PROCEDURE — 1159F MED LIST DOCD IN RCRD: CPT | Mod: HCNC,CPTII,S$GLB, | Performed by: STUDENT IN AN ORGANIZED HEALTH CARE EDUCATION/TRAINING PROGRAM

## 2024-02-02 PROCEDURE — 99999 PR PBB SHADOW E&M-EST. PATIENT-LVL III: CPT | Mod: PBBFAC,HCNC,, | Performed by: STUDENT IN AN ORGANIZED HEALTH CARE EDUCATION/TRAINING PROGRAM

## 2024-02-02 PROCEDURE — 3288F FALL RISK ASSESSMENT DOCD: CPT | Mod: HCNC,CPTII,S$GLB, | Performed by: STUDENT IN AN ORGANIZED HEALTH CARE EDUCATION/TRAINING PROGRAM

## 2024-02-02 PROCEDURE — 3008F BODY MASS INDEX DOCD: CPT | Mod: HCNC,CPTII,S$GLB, | Performed by: STUDENT IN AN ORGANIZED HEALTH CARE EDUCATION/TRAINING PROGRAM

## 2024-02-02 PROCEDURE — 99213 OFFICE O/P EST LOW 20 MIN: CPT | Mod: HCNC,S$GLB,, | Performed by: STUDENT IN AN ORGANIZED HEALTH CARE EDUCATION/TRAINING PROGRAM

## 2024-02-02 PROCEDURE — 96361 HYDRATE IV INFUSION ADD-ON: CPT | Mod: HCNC

## 2024-02-02 PROCEDURE — 99285 EMERGENCY DEPT VISIT HI MDM: CPT | Mod: HCNC,25

## 2024-02-02 PROCEDURE — 1125F AMNT PAIN NOTED PAIN PRSNT: CPT | Mod: HCNC,CPTII,S$GLB, | Performed by: STUDENT IN AN ORGANIZED HEALTH CARE EDUCATION/TRAINING PROGRAM

## 2024-02-02 NOTE — TELEPHONE ENCOUNTER
Appt scheduled per chart review below:    Appointment Information   Name: JACQUELINE JUNE MRN: [id number]   Date: 2/2/2024 Status: [facility]   Appt Time: 11:20 AM Length: 20   Visit Type: EP - PRIMARY CARE ([facility]) [486] Copay: $0.00   Provider: Kapil Le MD Dept: [facility], Internal Medicine       Dept Address: [address] [place] [id number]       Dept Phone Number: [phone]   Referring Provider:   CSN: [id number]   Appt Phone:     Notes: stomach pain   Made On: 2/2/2024 7:49 AM By: ANUJ REY [[id number]] (ES)

## 2024-02-02 NOTE — TELEPHONE ENCOUNTER
----- Message from Bruce Hutchinson sent at 2/1/2024  5:48 PM CST -----  Type:  Patient Returning Call    Who Called:pt  Who Left Message for Patient:  Does the patient know what this is regarding?:Rx- diarrhea  Would the patient rather a call back or a response via [facility]? Call  Best Call Back Number:  [phone]  Additional Information: pt states she has been having diarrhea since Tuesday morning and would like to see if provider can send something into pharmacy.    [id number] - [place], [place] - [address] [facility] [address] [facility]

## 2024-02-02 NOTE — PROGRESS NOTES
[facility]    Subjective:     Patient ID: Alicia Toussaint is a 73 y.o. female.  Chief Complaint: Abdominal Pain and Diarrhea    HPI:  Patient is a 73 y.o. female who   has a past medical history of Abnormal weight loss (7/23/2017), Arthritis (12/30/2018), Complication of anesthesia (2009), Elevated TSH (7/23/2017), Hypercholesteremia (7/23/2017), Osteoporosis, post-menopausal, and Vitamin D deficiency (7/23/2017).  who presents for diarrhea.     Today for evaluation of diarrhea and abdominal pain.  States approximately 4 days ago she developed diarrhea.  This consisted of multiple watery bowel movements.  Patient stopped eating and the bowel movements so down however 2 days ago she felt good enough to eat and subsequently had return if symptoms.  Her last bowel movement was around 1:30 p.m. yesterday.  And total had 6 watery bowel movements yesterday.  She took Imodium yesterday evening and has not had a bowel movement since.  She continues to experience mild intermittent abdominal pain and a lot of gas.  She was wondering if there is anything further that can be done.  She has had some mild nausea but has not had any vomiting.  She was able to maintain adequate p.o. intake.  States she was has been drinking a lot of water.  She denies noticing any blood in her stool.  She denies subjective fever; she took her temperature last night and this was normal.  Denies any other symptoms such as headache, chills, night sweats.  Her primary concern is the continued bloating and gas that she was experiencing.      Current Outpatient Medications   Medication Instructions    ascorbic acid (vitamin C) (VITAMIN C) 100 mg, Oral, Daily    calcium carbonate 1,250 mg, 2 times daily with meals    CYANOCOBALAMIN, VITAMIN B-12, (VITAMIN B-12 ORAL) 2,500 mg, Oral, Every morning, OTC     diclofenac sodium (VOLTAREN) 2 g, Topical (Top), 4 times daily    ergocalciferol, vitamin D2, (VITAMIN D ORAL) Oral    fish  "oil-omega-3 fatty acids 300-1,000 mg capsule 2 g, Daily    multivitamin (THERAGRAN) per tablet 1 tablet, Daily       Objective:        Body mass index is 17.68 kg/m².  Vitals:    02/02/24 1113   BP: 135/76   Pulse: 74   Weight: 39.7 kg (87 lb 8.4 oz)   Height: 4' 11" (1.499 m)   PainSc:   5   PainLoc: Abdomen     Physical Exam  Constitutional:       Appearance: Normal appearance.   Cardiovascular:      Rate and Rhythm: Normal rate.   Pulmonary:      Effort: Pulmonary effort is normal.      Breath sounds: No stridor.   Abdominal:      General: Abdomen is flat. Bowel sounds are increased. There is no distension.      Palpations: Abdomen is soft.      Tenderness: There is no abdominal tenderness. There is no guarding or rebound.   Neurological:      Mental Status: She is alert.           Assessment:         1. Acute diarrhea          Plan:   Given normal vitals, lack of other symptoms, and benign abdomen on exam no further treatment testing is warranted at this time. Discussed typical course of acute diarrhea, specifically that most symptoms resolve in about one week and testing for a specific pathogen is not indicated as most cases we will resolve prior to the results coming back.  She does not have nausea or vomiting and his able to maintain p.o. intake.  I advised continued aggressive hydration and electrolyte replacement with placement with water, Gatorade, and soup broth as tolerated. Discussed BRAT diet and advised her to avoid lactose for the next few days until her symptoms completely resolve.  As patient has not hematochezia or bloody stools and she has no risk factors for C diff infection she may use Imodium p.r.n. to be taken after experiencing diarrhea.  Discussed proper use of this medication to avoid over treating and leading to constipation.  Advised her to follow up with us if symptoms worsen or do not improve in the next 3-4 days.    Kapil Le MD  Internal Medicine  [facility]  02/02/2024 1:03 PM      All of your core healthy metrics are met.    No follow-ups on file. or sooner prn (as needed)        Kapil Le  [facility]  [address] [address]  Phone [phone]  Fax [phone]      This note is dictated using the M*Modal Fluency Direct word recognition program. It may contain word recognition mistakes or wrong word substitutions that were missed on review.  I spent a total of 28 minutes on the day of the visit.  This includes face to face time and non-face to face time preparing to see the patient (eg, review of tests), obtaining and/or reviewing separately obtained history, documenting clinical information in the electronic or other health record, independently interpreting results and communicating results to the patient/family/caregiver, or care coordinator.

## 2024-02-03 PROBLEM — K83.8 DILATION OF BILIARY TRACT: Status: ACTIVE | Noted: 2024-02-03

## 2024-02-03 PROBLEM — F41.1 GAD (GENERALIZED ANXIETY DISORDER): Chronic | Status: ACTIVE | Noted: 2024-02-03

## 2024-02-03 PROBLEM — R63.4 UNINTENTIONAL WEIGHT LOSS: Status: ACTIVE | Noted: 2024-02-03

## 2024-02-03 PROBLEM — E46 PROTEIN-CALORIE MALNUTRITION: Status: ACTIVE | Noted: 2024-02-03

## 2024-02-03 PROBLEM — K52.9 ENTERITIS: Status: ACTIVE | Noted: 2024-02-03

## 2024-02-03 LAB
ALBUMIN SERPL BCP-MCNC: 3.7 G/DL (ref 3.5–5.2)
ALP SERPL-CCNC: 53 U/L (ref 55–135)
ALT SERPL W/O P-5'-P-CCNC: 27 U/L (ref 10–44)
ANION GAP SERPL CALC-SCNC: 11 MMOL/L (ref 8–16)
ANION GAP SERPL CALC-SCNC: 9 MMOL/L (ref 8–16)
AST SERPL-CCNC: 36 U/L (ref 10–40)
BACTERIA #/AREA URNS HPF: NORMAL /HPF
BASOPHILS # BLD AUTO: 0.01 K/UL (ref 0–0.2)
BASOPHILS # BLD AUTO: 0.01 K/UL (ref 0–0.2)
BASOPHILS NFR BLD: 0.2 % (ref 0–1.9)
BASOPHILS NFR BLD: 0.2 % (ref 0–1.9)
BILIRUB SERPL-MCNC: 0.5 MG/DL (ref 0.1–1)
BILIRUB UR QL STRIP: NEGATIVE
BUN SERPL-MCNC: 17 MG/DL (ref 8–23)
BUN SERPL-MCNC: 23 MG/DL (ref 8–23)
CALCIUM SERPL-MCNC: 8.2 MG/DL (ref 8.7–10.5)
CALCIUM SERPL-MCNC: 9.4 MG/DL (ref 8.7–10.5)
CHLORIDE SERPL-SCNC: 104 MMOL/L (ref 95–110)
CHLORIDE SERPL-SCNC: 113 MMOL/L (ref 95–110)
CLARITY UR: CLEAR
CO2 SERPL-SCNC: 21 MMOL/L (ref 23–29)
CO2 SERPL-SCNC: 26 MMOL/L (ref 23–29)
COLOR UR: YELLOW
CREAT SERPL-MCNC: 0.7 MG/DL (ref 0.5–1.4)
CREAT SERPL-MCNC: 0.8 MG/DL (ref 0.5–1.4)
CREAT SERPL-MCNC: 0.8 MG/DL (ref 0.5–1.4)
DIFFERENTIAL METHOD BLD: NORMAL
DIFFERENTIAL METHOD BLD: NORMAL
EOSINOPHIL # BLD AUTO: 0.1 K/UL (ref 0–0.5)
EOSINOPHIL # BLD AUTO: 0.1 K/UL (ref 0–0.5)
EOSINOPHIL NFR BLD: 1.5 % (ref 0–8)
EOSINOPHIL NFR BLD: 1.5 % (ref 0–8)
ERYTHROCYTE [DISTWIDTH] IN BLOOD BY AUTOMATED COUNT: 13.2 % (ref 11.5–14.5)
ERYTHROCYTE [DISTWIDTH] IN BLOOD BY AUTOMATED COUNT: 13.2 % (ref 11.5–14.5)
EST. GFR  (NO RACE VARIABLE): >60 ML/MIN/1.73 M^2
EST. GFR  (NO RACE VARIABLE): >60 ML/MIN/1.73 M^2
GLUCOSE SERPL-MCNC: 89 MG/DL (ref 70–110)
GLUCOSE SERPL-MCNC: 89 MG/DL (ref 70–110)
GLUCOSE UR QL STRIP: NEGATIVE
HCT VFR BLD AUTO: 39.7 % (ref 37–48.5)
HCT VFR BLD AUTO: 43.9 % (ref 37–48.5)
HGB BLD-MCNC: 13.4 G/DL (ref 12–16)
HGB BLD-MCNC: 14.7 G/DL (ref 12–16)
HGB UR QL STRIP: NEGATIVE
IMM GRANULOCYTES # BLD AUTO: 0.01 K/UL (ref 0–0.04)
IMM GRANULOCYTES # BLD AUTO: 0.01 K/UL (ref 0–0.04)
IMM GRANULOCYTES NFR BLD AUTO: 0.2 % (ref 0–0.5)
IMM GRANULOCYTES NFR BLD AUTO: 0.2 % (ref 0–0.5)
KETONES UR QL STRIP: NEGATIVE
LEUKOCYTE ESTERASE UR QL STRIP: ABNORMAL
LIPASE SERPL-CCNC: 14 U/L (ref 4–60)
LYMPHOCYTES # BLD AUTO: 1.8 K/UL (ref 1–4.8)
LYMPHOCYTES # BLD AUTO: 2 K/UL (ref 1–4.8)
LYMPHOCYTES NFR BLD: 34.3 % (ref 18–48)
LYMPHOCYTES NFR BLD: 39.6 % (ref 18–48)
MAGNESIUM SERPL-MCNC: 2 MG/DL (ref 1.6–2.6)
MCH RBC QN AUTO: 30 PG (ref 27–31)
MCH RBC QN AUTO: 30 PG (ref 27–31)
MCHC RBC AUTO-ENTMCNC: 33.5 G/DL (ref 32–36)
MCHC RBC AUTO-ENTMCNC: 33.8 G/DL (ref 32–36)
MCV RBC AUTO: 89 FL (ref 82–98)
MCV RBC AUTO: 90 FL (ref 82–98)
MICROSCOPIC COMMENT: NORMAL
MONOCYTES # BLD AUTO: 0.6 K/UL (ref 0.3–1)
MONOCYTES # BLD AUTO: 0.9 K/UL (ref 0.3–1)
MONOCYTES NFR BLD: 14 % (ref 4–15)
MONOCYTES NFR BLD: 14.4 % (ref 4–15)
NEUTROPHILS # BLD AUTO: 2 K/UL (ref 1.8–7.7)
NEUTROPHILS # BLD AUTO: 2.9 K/UL (ref 1.8–7.7)
NEUTROPHILS NFR BLD: 44.5 % (ref 38–73)
NEUTROPHILS NFR BLD: 49.4 % (ref 38–73)
NITRITE UR QL STRIP: NEGATIVE
NRBC BLD-RTO: 0 /100 WBC
NRBC BLD-RTO: 0 /100 WBC
PH UR STRIP: 7 [PH] (ref 5–8)
PLATELET # BLD AUTO: 184 K/UL (ref 150–450)
PLATELET # BLD AUTO: 204 K/UL (ref 150–450)
PLATELET BLD QL SMEAR: NORMAL
PMV BLD AUTO: 10 FL (ref 9.2–12.9)
PMV BLD AUTO: 10.2 FL (ref 9.2–12.9)
POTASSIUM SERPL-SCNC: 3.7 MMOL/L (ref 3.5–5.1)
POTASSIUM SERPL-SCNC: 4.6 MMOL/L (ref 3.5–5.1)
PROCALCITONIN SERPL IA-MCNC: 0.25 NG/ML
PROT SERPL-MCNC: 7.1 G/DL (ref 6–8.4)
PROT UR QL STRIP: NEGATIVE
RBC # BLD AUTO: 4.46 M/UL (ref 4–5.4)
RBC # BLD AUTO: 4.9 M/UL (ref 4–5.4)
RBC #/AREA URNS HPF: 0 /HPF (ref 0–4)
SAMPLE: NORMAL
SARS-COV-2 RDRP RESP QL NAA+PROBE: NEGATIVE
SODIUM SERPL-SCNC: 141 MMOL/L (ref 136–145)
SODIUM SERPL-SCNC: 143 MMOL/L (ref 136–145)
SP GR UR STRIP: <=1.005 (ref 1–1.03)
SQUAMOUS #/AREA URNS HPF: 1 /HPF
TSH SERPL DL<=0.005 MIU/L-ACNC: 2.58 UIU/ML (ref 0.4–4)
URN SPEC COLLECT METH UR: ABNORMAL
UROBILINOGEN UR STRIP-ACNC: NEGATIVE EU/DL
WBC # BLD AUTO: 4.57 K/UL (ref 3.9–12.7)
WBC # BLD AUTO: 5.91 K/UL (ref 3.9–12.7)
WBC #/AREA URNS HPF: 5 /HPF (ref 0–5)

## 2024-02-03 PROCEDURE — 25500020 PHARM REV CODE 255: Mod: HCNC | Performed by: INTERNAL MEDICINE

## 2024-02-03 PROCEDURE — 87040 BLOOD CULTURE FOR BACTERIA: CPT | Mod: 59,HCNC | Performed by: INTERNAL MEDICINE

## 2024-02-03 PROCEDURE — G0378 HOSPITAL OBSERVATION PER HR: HCPCS | Mod: HCNC

## 2024-02-03 PROCEDURE — 80048 BASIC METABOLIC PNL TOTAL CA: CPT | Mod: HCNC,XB | Performed by: PHYSICIAN ASSISTANT

## 2024-02-03 PROCEDURE — 84145 PROCALCITONIN (PCT): CPT | Mod: HCNC | Performed by: INTERNAL MEDICINE

## 2024-02-03 PROCEDURE — 96361 HYDRATE IV INFUSION ADD-ON: CPT

## 2024-02-03 PROCEDURE — 25000003 PHARM REV CODE 250: Mod: HCNC | Performed by: INTERNAL MEDICINE

## 2024-02-03 PROCEDURE — 81000 URINALYSIS NONAUTO W/SCOPE: CPT | Mod: HCNC | Performed by: INTERNAL MEDICINE

## 2024-02-03 PROCEDURE — 83993 ASSAY FOR CALPROTECTIN FECAL: CPT | Mod: HCNC | Performed by: INTERNAL MEDICINE

## 2024-02-03 PROCEDURE — 63600175 PHARM REV CODE 636 W HCPCS: Mod: HCNC | Performed by: PHYSICIAN ASSISTANT

## 2024-02-03 PROCEDURE — 87329 GIARDIA AG IA: CPT | Mod: HCNC | Performed by: INTERNAL MEDICINE

## 2024-02-03 PROCEDURE — 87425 ROTAVIRUS AG IA: CPT | Mod: HCNC | Performed by: INTERNAL MEDICINE

## 2024-02-03 PROCEDURE — 96372 THER/PROPH/DIAG INJ SC/IM: CPT | Mod: 59 | Performed by: PHYSICIAN ASSISTANT

## 2024-02-03 PROCEDURE — 96365 THER/PROPH/DIAG IV INF INIT: CPT | Mod: HCNC

## 2024-02-03 PROCEDURE — A4216 STERILE WATER/SALINE, 10 ML: HCPCS | Mod: HCNC | Performed by: PHYSICIAN ASSISTANT

## 2024-02-03 PROCEDURE — 99900035 HC TECH TIME PER 15 MIN (STAT): Mod: HCNC

## 2024-02-03 PROCEDURE — 80053 COMPREHEN METABOLIC PANEL: CPT | Mod: HCNC | Performed by: INTERNAL MEDICINE

## 2024-02-03 PROCEDURE — 82565 ASSAY OF CREATININE: CPT | Mod: 91,HCNC

## 2024-02-03 PROCEDURE — 85025 COMPLETE CBC W/AUTO DIFF WBC: CPT | Mod: HCNC | Performed by: INTERNAL MEDICINE

## 2024-02-03 PROCEDURE — 25000003 PHARM REV CODE 250: Mod: HCNC | Performed by: PHYSICIAN ASSISTANT

## 2024-02-03 PROCEDURE — 87427 SHIGA-LIKE TOXIN AG IA: CPT | Mod: HCNC | Performed by: INTERNAL MEDICINE

## 2024-02-03 PROCEDURE — 96372 THER/PROPH/DIAG INJ SC/IM: CPT | Performed by: PHYSICIAN ASSISTANT

## 2024-02-03 PROCEDURE — 87449 NOS EACH ORGANISM AG IA: CPT | Mod: 91,HCNC | Performed by: INTERNAL MEDICINE

## 2024-02-03 PROCEDURE — 25000003 PHARM REV CODE 250: Mod: HCNC | Performed by: HOSPITALIST

## 2024-02-03 PROCEDURE — 84443 ASSAY THYROID STIM HORMONE: CPT | Mod: HCNC | Performed by: HOSPITALIST

## 2024-02-03 PROCEDURE — 87209 SMEAR COMPLEX STAIN: CPT | Mod: HCNC | Performed by: INTERNAL MEDICINE

## 2024-02-03 PROCEDURE — 36415 COLL VENOUS BLD VENIPUNCTURE: CPT | Mod: HCNC | Performed by: HOSPITALIST

## 2024-02-03 PROCEDURE — 87045 FECES CULTURE AEROBIC BACT: CPT | Mod: HCNC | Performed by: INTERNAL MEDICINE

## 2024-02-03 PROCEDURE — 87449 NOS EACH ORGANISM AG IA: CPT | Mod: HCNC | Performed by: INTERNAL MEDICINE

## 2024-02-03 PROCEDURE — G0426 INPT/ED TELECONSULT50: HCPCS | Mod: HCNC,95,, | Performed by: PSYCHIATRY & NEUROLOGY

## 2024-02-03 PROCEDURE — U0002 COVID-19 LAB TEST NON-CDC: HCPCS | Mod: HCNC | Performed by: HOSPITALIST

## 2024-02-03 PROCEDURE — 85025 COMPLETE CBC W/AUTO DIFF WBC: CPT | Mod: 91,HCNC | Performed by: PHYSICIAN ASSISTANT

## 2024-02-03 PROCEDURE — 83735 ASSAY OF MAGNESIUM: CPT | Mod: HCNC | Performed by: PHYSICIAN ASSISTANT

## 2024-02-03 PROCEDURE — 83690 ASSAY OF LIPASE: CPT | Mod: HCNC | Performed by: INTERNAL MEDICINE

## 2024-02-03 PROCEDURE — 89055 LEUKOCYTE ASSESSMENT FECAL: CPT | Mod: HCNC | Performed by: INTERNAL MEDICINE

## 2024-02-03 PROCEDURE — 96366 THER/PROPH/DIAG IV INF ADDON: CPT | Mod: HCNC

## 2024-02-03 PROCEDURE — 87046 STOOL CULTR AEROBIC BACT EA: CPT | Mod: HCNC | Performed by: INTERNAL MEDICINE

## 2024-02-03 RX ORDER — HEPARIN SODIUM 5000 [USP'U]/ML
5000 INJECTION, SOLUTION INTRAVENOUS; SUBCUTANEOUS EVERY 8 HOURS
Status: DISCONTINUED | OUTPATIENT
Start: 2024-02-03 | End: 2024-02-04 | Stop reason: HOSPADM

## 2024-02-03 RX ORDER — AMOXICILLIN 250 MG
1 CAPSULE ORAL 2 TIMES DAILY PRN
Status: DISCONTINUED | OUTPATIENT
Start: 2024-02-03 | End: 2024-02-04 | Stop reason: HOSPADM

## 2024-02-03 RX ORDER — DICYCLOMINE HYDROCHLORIDE 10 MG/1
10 CAPSULE ORAL 4 TIMES DAILY PRN
Status: DISCONTINUED | OUTPATIENT
Start: 2024-02-03 | End: 2024-02-04 | Stop reason: HOSPADM

## 2024-02-03 RX ORDER — LOPERAMIDE HYDROCHLORIDE 2 MG/1
2 CAPSULE ORAL 4 TIMES DAILY PRN
Status: DISCONTINUED | OUTPATIENT
Start: 2024-02-03 | End: 2024-02-04 | Stop reason: HOSPADM

## 2024-02-03 RX ORDER — SODIUM CHLORIDE 9 MG/ML
INJECTION, SOLUTION INTRAVENOUS CONTINUOUS
Status: DISCONTINUED | OUTPATIENT
Start: 2024-02-03 | End: 2024-02-04 | Stop reason: HOSPADM

## 2024-02-03 RX ORDER — SODIUM CHLORIDE 0.9 % (FLUSH) 0.9 %
10 SYRINGE (ML) INJECTION EVERY 8 HOURS
Status: DISCONTINUED | OUTPATIENT
Start: 2024-02-03 | End: 2024-02-04 | Stop reason: HOSPADM

## 2024-02-03 RX ORDER — NALOXONE HCL 0.4 MG/ML
0.02 VIAL (ML) INJECTION
Status: DISCONTINUED | OUTPATIENT
Start: 2024-02-03 | End: 2024-02-04 | Stop reason: HOSPADM

## 2024-02-03 RX ORDER — ACETAMINOPHEN 325 MG/1
650 TABLET ORAL EVERY 6 HOURS PRN
Status: DISCONTINUED | OUTPATIENT
Start: 2024-02-03 | End: 2024-02-04 | Stop reason: HOSPADM

## 2024-02-03 RX ORDER — TALC
6 POWDER (GRAM) TOPICAL NIGHTLY PRN
Status: DISCONTINUED | OUTPATIENT
Start: 2024-02-03 | End: 2024-02-04 | Stop reason: HOSPADM

## 2024-02-03 RX ORDER — L. ACIDOPHILUS/L.BULGARICUS 100MM CELL
1 GRANULES IN PACKET (EA) ORAL 2 TIMES DAILY
Status: DISCONTINUED | OUTPATIENT
Start: 2024-02-03 | End: 2024-02-04 | Stop reason: HOSPADM

## 2024-02-03 RX ADMIN — SODIUM CHLORIDE 1000 ML: 0.9 INJECTION, SOLUTION INTRAVENOUS at 12:02

## 2024-02-03 RX ADMIN — SODIUM CHLORIDE: 9 INJECTION, SOLUTION INTRAVENOUS at 10:02

## 2024-02-03 RX ADMIN — SODIUM CHLORIDE: 9 INJECTION, SOLUTION INTRAVENOUS at 05:02

## 2024-02-03 RX ADMIN — IOHEXOL 75 ML: 350 INJECTION, SOLUTION INTRAVENOUS at 01:02

## 2024-02-03 RX ADMIN — Medication 10 ML: at 06:02

## 2024-02-03 RX ADMIN — HEPARIN SODIUM 5000 UNITS: 5000 INJECTION INTRAVENOUS; SUBCUTANEOUS at 05:02

## 2024-02-03 RX ADMIN — LACTOBACILLUS ACIDOPHILUS / LACTOBACILLUS BULGARICUS 1 EACH: 100 MILLION CFU STRENGTH GRANULES at 10:02

## 2024-02-03 RX ADMIN — HEPARIN SODIUM 5000 UNITS: 5000 INJECTION INTRAVENOUS; SUBCUTANEOUS at 06:02

## 2024-02-03 RX ADMIN — PIPERACILLIN AND TAZOBACTAM 4.5 G: 4; .5 INJECTION, POWDER, LYOPHILIZED, FOR SOLUTION INTRAVENOUS; PARENTERAL at 06:02

## 2024-02-03 NOTE — ASSESSMENT & PLAN NOTE
-Noted on CT abdomen/pelvis  -Tbili, AST and ALT all normal  -Agree with GI recs for outpatient eval with MRCP

## 2024-02-03 NOTE — PLAN OF CARE
Malachi contacted pt via telephone (room [phone]) to complete discharge planning assessment. Chart review completed prior to completing discharge planning assessment. Pt was alert, oriented and easily engaged. Malachi introduced self and explained the role of discharge planner.     Malachi verified and confirmed information on facesheet. Pt's spouse, Rylan Toussaint Jr., is her primary support person and will assist as needed.    PCP: Ranjit Sweeney MD  Pharmacy: [facility] ([address] [facility]: agreeable to bedside delivery  Transportation: Yes  Insurance: correct on facesheet  HH: none  DME: none    There are no current discharge needs. CM available should any needs arise.       02/03/24 1402   Discharge Assessment   Confirmed/corrected address, phone number and insurance Yes   Source of Information patient   Communicated KAYLA with patient/caregiver Date not available/Unable to determine   People in Home spouse   Do you expect to return to your current living situation? Yes   Do you have help at home or someone to help you manage your care at home? Yes   Who are your caregiver(s) and their phone number(s)? Rylan Toussaint - spouse - [phone]   Prior to hospitilization cognitive status: Alert/Oriented   Current cognitive status: Alert/Oriented   Walking or Climbing Stairs Difficulty no   Dressing/Bathing Difficulty no   Equipment Currently Used at Home none   Do you have prescription coverage? Yes   Do you have any problems affording any of your prescribed medications? No   Who is going to help you get home at discharge?    How do you get to doctors appointments? car, drives self   Discharge Plan A Home with family

## 2024-02-03 NOTE — CONSULTS
[id number]}        TELEPSYCHIATRY: INITIAL EVALUATION     ASSESSMENT AND PLAN:     DIAGNOSES & PROBLEMS:  Generalized Anxiety Disorder    In Summary:  - Patient presents with diarrhea and abdominal pain.  She also has had significant unintentional weight loss over past year.  She is quite anxious, and there is concern that this anxiety could be contributing to her weight loss.  Psychiatry was consulted for recommendations.  Although anxiety certainly could be contributing to presentation, it may be unrelated - would continue search for other underlying causes.  There is no depression and there are no safety concerns.    Plan:  - I spoke to patient about options for treatment of anxiety.  We discussed possibility of a medication.  I would recommend mirtazapine 15mg bedtime - this would help with anxiety but also has a side effect of weight gain which would be very useful in this particular situation.  Also it tends to cause sedation and could help her get a better night sleep.  She is very hesitant to start a medication - concerned about side effects.  I gave her and  the name and they will research more, and speak to internists in AM about whether or not she'd like to trial.  If she opts to not take a medication, she could alternatively pursue psychotherapy.  She could also take medication and do psychotherapy together.  If she does take medication, likely could follow with PMD and be referred to psychiatry if problems develop.     With reasonable medical certainty, based on history, chart review, available collateral information, and a present-state examination:  - the patient does not currently meet the criteria for psychiatric hospitalization  - the patient can be safely and effectively managed in the community  - adequate support, monitoring, and/or structures are in place  - PEC is not indicated       PRESENTATION:     Alicia Toussaint is a 73 y.o. patient seen for an initial psychiatric evaluation.  A history of  "the presenting illness (HPI) was obtained, and a pertinent psychiatric and medical review of systems was performed.    Per Chart:  Per Hospital Medicine:  Principal Problem:Enteritis  HPI:  Ms. Alicia Toussaint is a 73 y.o. female, with PMH of prediabetes, who presented to [facility] ED on 2/2/24 due to diarrhea and abdominal pain. She states the pain began on Tuesday, was associated with watery diarrhea, and she attributed it to something she ate. She took Pepto-Bismol and the symptoms resolved. She was able to eat on Wednesday, but has had reduced appetite. On Thursday she ate some chicken, and the pain and diarrhea returned. She has tried taking imodium, but symptoms were only relieved temporarily. She denies sick contacts, recent travel, consumption of raw/undercooked foods. Yesterday she had an appointment with another doctor in her PCP's office, where she reported she had 6 watery bowel movements with the final one occurring at 1330 yesterday, and being stopped by imodium. She reported associated abdominal/gas pain, and mild nausea without vomiting. She states she has been tolerating water, and has had a subjective fever. She denied headache, chills, night sweats, lightheadedness, dizziness. She was evaluated in the ED with labs showing no leukocytosis or left shift.  A metabolic panel showed no significant abnormalities. A procalcitonin was mildly elevated at 0.25. UA was without UTI. A CT of the abdomen and pelvis showed findings concerning for enteritis of the small bowel, and mild intrahepatic biliary ductal dilatation. She was placed on observation.   Interval History: No acute events overnight.  Has had no further abdominal pain or diarrhea since admit.  No recent antibiotics and no one around has any similar illness.  Notes significant unintentional weight loss in last year.  Reports significant "worrying" and , who is at bedside, reports she often skips meals.  All questions answered and patient had no " further complaints.  Unintentional weight loss  -Patient with marked unintentional weight loss over last year  -Body mass index is 18.12 kg/m².  -Reports had C-scope in last several years which was normal but for 1 polyp  -Reports has regular mammograms and pap smears -   -Notes significant anxiety and frequent worry.  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  He feels she is at her typical baseline in terms of worry.    REVIEW OF SYSTEMS:  I[]I Patient unable or unwilling to provide any ROS.    [x] Y  [] N  sleep disturbance: *typically 7 to 7.5 hours but sometimes wakes up early* Globally: occasionally Positive for: terminal insomnia  [x] Y  [] N  appetite/weight change: *for about a year*  Positive for: decreased appetite, weight loss (unintentional)  [] Y  [x] N  fatigue/anergia: **    [] Y  [x] N  impairment in focus/concentration: **       [] Y  [x] N  depression: **   Negative for: worthlessness, excessive or inappropriate guilt, hopelessness  [x] Y  [] N  anxiety/worry: **  Positive for: more worry than the average person   [] Y  [] N  somatically preoccupied: **   [] Y  [x] N  dysregulated mood/behavior: **   Negative for: moodiness, irritability  [] Y  [x] N  manic symptomatology: **     [] Y  [x] N  psychosis: **   Negative for: paranoia, hallucinations        CURRENT PSYCHOTROPIC REGIMEN:  None    CURRENT PSYCHIATRIC PROVIDER:  No      HISTORY:     I[]I Patient unable or unwilling to provide any history.    I[]I Y  I[x]I N  I[]I U  Psychiatric Diagnoses/Symptomatology:   I[]I Y  I[x]I N   I[]I U  Hx of Psychiatric Hospitalization:   I[]I Y  I[x]I N  I[]I U  Hx of Outpatient Psychiatric Treatment (psychiatry/psychotherapy):   I[]I Y  I[x]I N  I[]I U  Psychotropic Trials:   I[]I Y  I[x]I N  I[]I U  Prior Suicide Attempts:   I[]I Y  I[x]I N  I[]I U  Hx of Suicidal Ideation:   I[]I Y  I[x]I N  I[]I U  Hx of Homicidal Ideation:   I[]I Y  I[x]I N  I[]I U  Hx of Self-Injurious Behavior (Non-Suicidal):   I[]I Y  I[x]I N  I[]I U  Hx of Violence:   I[]I Y  I[x]I N  I[]I U  Documented Hx of Malingering:   I[]I Y  I[x]I N  I[]I U  Hx of Detox:   I[]I Y  I[x]I N  I[]I U  Hx of Rehab:     I[]I Y  I[x]I N  I[]I U  I[]I Former  Nicotine Use:    I[x]I Y  I[]I N  I[]I U  I[]I Former  Alcohol Consumption:  social - rare  I[]I Y  I[x]I N  I[]I U  I[]I Former  Alcohol Misuse/Abuse:   I[]I Y  I[x]I N  I[]I U  I[]I Former  Illicit Drug Use/Misuse/Abuse:   I[]I Y  I[x]I N  I[]I U  I[]I Former  Misuse/Abuse of Rx Medications:   I[]I Cannabis  I[]I Cocaine  I[]I Heroin  I[]I Meth  I[]I Opioids  I[]I Stimulants  I[]I Benzos  I[]I Other:       FAMILY HISTORY:  I[]I Y  I[x]I N  I[]I U        I[]I Y  I[x]I N  I[]I U  Hx of Trauma/Neglect:   I[]I Y  I[x]I N  I[]I U  Hx of Physical Abuse:   I[]I Y  I[x]I N  I[]I U  Hx of Sexual Abuse:   I[]I Y  I[x]I N  I[]I U  Happy Childhood:   I[]I Y  I[x]I N  I[]I U  Significant Developmental Delay/Disability:   I[x]I Y  I[]I N  I[]I U  GED/High School Diploma or Beyond:   I[x]I Y  I[]I N  I[]I U  Currently Employed: works full time in [employer]  I[]I Y  I[x]I N  I[]I U  On or Applying for Disability:   I[x]I Y  I[]I N  I[]I U  Functions Independently:   I[x]I Y  I[]I N  I[]I U  Financially Stable:   I[x]I Y  I[]I N  I[]I U  Domiciled:   I[x]I Y  I[]I N  I[]I U  Intact Support System:   I[x]I Y  I[]I N  I[]I U  Currently in a Romantic Relationship:   I[x]I Y  I[]I N  I[]I U  Ever :   I[x]I Y  I[]I N  I[]I U  Children/Dependents:   I[x]I Y  I[]I N  I[]I U  Muslim/Spiritual:  Gnosticist  I[]I Y  I[x]I N  I[]I U   History:   I[]I Y  I[x]I N  I[]I U  Current Legal Issues:   I[]I Y  I[x]I N  I[]I U  Past Charges/Convictions:   I[]I Y  I[x]I N  I[]I U  Hx of Incarceration:   I[]I Y  I[x]I N  I[]I U  Access to a Gun?:   NOTE: patient counseled on gun safety, including safe storage.  NOTE: patient counseled on inherent risks associated with gun ownership.    I[]I Y  I[x]I N  I[]I U  Hx of Seizure:   I[]I Y  I[x]I N  I[]I U  Hx of Significant Head Injury (e.g., Loss of Consciousness, Concussion, Coma):   I[x]I Y  I[]I N  I[]I U  Medical History & Diagnoses:     The patient's past medical history has been reviewed and updated as appropriate within the electronic medical record system.  Problem List:  2024-02: Enteritis  2024-02: Unintentional weight loss  2024-02: Protein-calorie malnutrition  2024-02: Dilation of biliary tract  2020-11: Prediabetes  2019-10: Age related osteoporosis  2018-12: Arthritis  2017-07: Known medical problems  2017-07: Abnormal weight loss  2017-07: Vitamin D deficiency  2017-07: Elevated TSH  2017-07: Hypercholesteremia  2017-05: Encounter for hepatitis C screening test for low risk patient  Osteoporosis, post-menopausal      Scheduled and PRN Medications: The electronic chart was reviewed and updated as appropriate.  See Medcard for details.    Current Facility-Administered Medications:     0.9%  NaCl infusion, , Intravenous, Continuous, Aleshia Guzman PA-C, Last Rate: 125 mL/hr at 02/03/24 1033, New Bag at 02/03/24 1033    acetaminophen tablet 650 mg, 650 mg, Oral, Q6H PRN, Aleshia Guzman PA-C    dicyclomine capsule 10 mg, 10 mg, Oral, QID PRN, Aleshia Guzman PA-C    heparin (porcine) injection 5,000 Units, 5,000 Units, Subcutaneous, Q8H, Aleshia Guzman PA-C, 5,000 Units at 02/03/24 1804    lactobacillus acidophilus & bulgar 100 million cell packet 1 each, 1 packet, Oral, BID, Marker, Tai BEASLEY MD, 1 each at 02/03/24 1030     "loperamide capsule 2 mg, 2 mg, Oral, QID PRN, Tai Sanchez MD    melatonin tablet 6 mg, 6 mg, Oral, Nightly PRN, Aleshia Guzman PA-C    naloxone 0.4 mg/mL injection 0.02 mg, 0.02 mg, Intravenous, PRN, Aleshia Guzman PA-C    senna-docusate 8.6-50 mg per tablet 1 tablet, 1 tablet, Oral, BID PRN, Aleshia Guzman PA-C    sodium chloride 0.9% flush 10 mL, 10 mL, Intravenous, Q8H, Aleshia Guzman PA-C, 10 mL at 02/03/24 1805    Allergies:  Aspirin, Augmentin [amoxicillin-pot clavulanate], and Tessalon [benzonatate]    Additional Relevant History, As Applicable:       EXAMINATION:     BP (!) 119/59 (BP Location: Left arm, Patient Position: Lying)   Pulse 67   Temp 98.6 °F (37 °C) (Oral)   Resp 20   Ht 4' 11" (1.499 m)   Wt 40.7 kg (89 lb 11.6 oz)   LMP  (LMP Unknown)   SpO2 98%   BMI 18.12 kg/m²     MENTAL STATUS EXAMINATION:  General Appearance & Behavior: in hospital garb, thin  Involuntary Movements and Motor Activity: no tics or tremors  Speech & Language: conversational, spontaneous  Mood: ok  Affect: quite anxious/worried  Thought Process & Associations: linear, rumiantive  Thought Content & Perceptions: no auditory hallucinations/visual hallucinations/paranoid ideation   Sensorium and Cognition: alert with clear sensorium, no significant cognitive impairment noted.  Insight & Judgment: insight impaired but judgment intact  Reliability: The patient is deemed to be a reliable and factually accurate historian. **      RISK MANAGEMENT:     The [place] Protocol Suicide Screen:    Always ask questions 1 and 2:     I[]I Y - Low Risk  I[x]I N  1) Wish To Be Dead: In the past month, have you wished you were dead or wished you could go to sleep and not wake up?: **   I[]I Y - Low Risk  I[x]I N  2) Non-Specific Active Suicidal Thoughts: In the past month, have you actually had any thoughts about killing yourself?: **    If YES to 2, ask questions 3, 4, 5, 6 and 7:  If NO to 2, skip to " question 7:    I[]I Y - Moderate Risk  I[]I N  I[x]I N/A  3) Active Suicidal Ideation with Any Methods (Not Plan) without Intent to Act: In the past month, have you been thinking about how you might do this?: **  I[]I Y - High Risk          I[]I N  I[x]I N/A  4) Active Suicidal Ideation with Some Intent to Act, without Specific Plan: In the past month, have you had these thoughts and had some intention of acting on them?: **  I[]I Y - High Risk          I[]I N  I[x]I N/A  5) Active Suicidal Ideation with Specific Plan: In the past month, have you started to work out or already worked out the details of how to kill yourself?: **  I[]I Y - High Risk          I[]I N  I[x]I N/A  6) Active Suicidal Ideation with Specific Plan and Intent: DO YOU INTEND TO CARRY OUT THIS PLAN?: **    Always Ask Question 7:     Suicidal Behavior: Have you done anything, started to do anything, or prepared to do anything to end your life...  I[]I Y - Moderate Risk  I[x]I N  Ever in your lifetime?: **  I[]I Y - High Risk          I[x]I N  In the past 3 months?: **    Examples: Collected pills, obtained a gun, gave away valuables, wrote a will or suicide note, took out pills but didn't swallow any, held a gun but changed mind or it was grabbed from hand, went to the roof but didn't jump; or actually took pills, tried to shoot self, cut self, tried to hang self, etc.      NOTE: The use of standardized rating scales and safety contracts can aid in risk assessment, but do not substitute for clinical judgment. My clinical assessment is in agreement with the [place] Protocol Suicide Screen.    Risk Parameters:  I[]I Y  I[x]I N  I[]I U  I[]I A  Suicidal Ideation/Behavior: **   I[]I Y  I[x]I N  I[]I U  I[]I A  Homicidal Ideation/Behavior: **  I[]I Y  I[x]I N  I[]I U  I[]I A  Violence: **  I[]I Y  I[x]I N  I[]I U  I[]I A  Self-Injurious Behavior: **    I[]I Y  I[x]I N  I[]I U  I[]I A  I[]I N/A  Minimization of Risk Parameters Suspected/Evident:  **  I[]I Y  I[x]I N  I[]I U  I[]I A  I[]I N/A  Exaggeration of Risk Parameters Suspected/Evident: **     The patient was able to satisfactorily contract for safety and was noted to be future oriented.  Protective factors were identified.    Current risk is judged to be:   I[x]I None/Negligible    I[]I Low    I[]I Moderate   I[]I High    [] Y  [x] N  I[]I U  I[]I N/A  Danger to Self:   [] Y  [x] N  I[]I U  I[]I N/A  Danger to Others:   [] Y  [x] N  I[]I U  I[]I N/A  Grave Disability:        Goldy Alcaraz MD  Department of Psychiatry, [facility] Board Certified, Psychiatry and Addiction Medicine      MANAGEMENT:     I[]I Y = Yes / Present / Endorses.  I[]I N = No / Absent / Denies.  I[]I U = Unknown / Unable to Assess / Unwilling to Participate.  I[]I A = Ambiguity Exists / Accuracy Uncertain.  I[]I D = Denial or Minimization is Suspected/Evident.  I[]I N/A = Non-Applicable.    Chart Review: Available documentation has been reviewed, and pertinent elements of the chart have been incorporated into this evaluation where appropriate.      [x] In cases of emergencies (e.g. SI/HI resulting in danger to self or others, functioning deteriorates to the level of grave disability), call 911 or [phone], or present to the emergency department for immediate assistance.  [x] Patient should not operate a motor vehicle or heavy machinery if effects of medications or underlying symptoms/condition impair the ability to safely do so.  [x] Comply with ANY/ALL medication fully as prescribed/instructed and report ANY/ALL suspected adverse effects to appropriate health care providers.    Written material has been provided to supplement, augment, and reinforce any discussions and interventions, via the AVS and/or other pre-printed handouts.  Alcohol, Tobacco, and Drug Counseling, as well as applicable resources, has been provided, as warranted.  Shared medical decision making and informed consent are the hallmark and bedrock  of good clinical care, and as such have been employed and obtained, respectively, to the degree possible.  Risk Mitigation Strategies, Harm Reduction Techniques, and Safety Netting are important interventions that can reduce acute and chronic risk, and as such have been employed to the degree possible.  Prescription Drug Management entails the review, recommendation, or consideration without recommendation of medications, and as such was employed during the encounter.  Additional Psychoeducation has been provided, as warranted.             DIAGNOSTIC TESTING:     Glu 89  2/3/2024   HgA1c 5.7 (H)  5/2/2023    Na 143  2/3/2024  Cr 0.7  2/3/2024  BUN 17  2/3/2024    GFR >60  2/3/2024     Alb 3.7  2/3/2024   T Bili 0.5  2/3/2024   Alk Phos 53 (L)  2/3/2024   AST 36  2/3/2024   ALT 27  2/3/2024     Ammonia *   *   Amylase *   *   Lipase 14  2/3/2024    TSH 2.585  2/3/2024   Free T4 *   *     Prolactin *   *   CPK *   *   Troponin I *   *     PT *   *   INR *   *     WBC 4.57  2/3/2024   Hgb 13.4  2/3/2024   HCT 39.7  2/3/2024     2/3/2024   ANC 2.0; 44.5;   2/3/2024     Cholesterol 191  5/2/2023   Triglycerides 65  5/2/2023   HDL 61  5/2/2023   .0  5/2/2023     B12 *   *   Folate *   *   Thiamine *   *   Vit D *   *      HIV 1/2 Ag/Ab *   *   Hep C *   *   RPR *   *    Lithium *   *   VPA *   *   Clozapine *   *     Alcohol (Urine) *   *   Benzodiazepines *   *   Barbiturates *   *   Cannabis *   *   Cocaine *   *   Amphetamines *   *   PCP *   *   Opiates *   *   Methadone *   *   Buprenorphine *   *   Fentanyl *   *     Ethanol *   *  PETH *   *   EtG *   *   GGT *   *   MCV 89  2/3/2024    UPT *   *    No results found for this or any previous visit.    No results found for this or any previous visit.    TELEPSYCHIATRY:     Patient agreeable to consultation via telepsychiatry.    This consultation was requested by Tai Sanchez MD.  The location  of the consulting psychiatrist is: [place], [place]  The patient location is:  [facility]*  Consultation Setting: inpatient unit  Also present with the patient at the time of the evaluation: spouse/significant other    Inpatient consult to Telemedicine - Psyc  Consult performed by: Goldy Alcaraz MD  Consult ordered by: Tai Sanchez MD        Consult Start Time: 02/03/2024 17:35 CST  Consult End Time: 02/03/2024 18:25 CST

## 2024-02-03 NOTE — PROGRESS NOTES
[facility] Surg [address] Medicine  Progress Note    Patient Name: Alicia Toussaint  MRN: [id number]  Patient Class: OP- Observation   Admission Date: 2/2/2024  Length of Stay: 0 days  Attending Physician: Tia Sanchez MD  Primary Care Provider: Ranjit Sweeney MD        Subjective:     Principal Problem:Enteritis        HPI:  Ms. Alicia Toussaint is a 73 y.o. female, with PMH of prediabetes, who presented to [facility] ED on 2/2/24 due to diarrhea and abdominal pain. She states the pain began on Tuesday, was associated with watery diarrhea, and she attributed it to something she ate. She took Pepto-Bismol and the symptoms resolved. She was able to eat on Wednesday, but has had reduced appetite. On Thursday she ate some chicken, and the pain and diarrhea returned. She has tried taking imodium, but symptoms were only relieved temporarily. She denies sick contacts, recent travel, consumption of raw/undercooked foods. Yesterday she had an appointment with another doctor in her PCP's office, where she reported she had 6 watery bowel movements with the final one occurring at 1330 yesterday, and being stopped by imodium. She reported associated abdominal/gas pain, and mild nausea without vomiting. She states she has been tolerating water, and has had a subjective fever. She denied headache, chills, night sweats, lightheadedness, dizziness. She was evaluated in the ED with labs showing no leukocytosis or left shift.  A metabolic panel showed no significant abnormalities. A procalcitonin was mildly elevated at 0.25. UA was without UTI. A CT of the abdomen and pelvis showed findings concerning for enteritis of the small bowel, and mild intrahepatic biliary ductal dilatation. She was placed on observation.     Overview/Hospital Course:  No notes on file    Interval History: No acute events overnight.  Has had no further abdominal pain or diarrhea since admit.  No recent antibiotics and no one around has any  "similar illness.  Notes significant unintentional weight loss in last year.  Reports significant "worrying" and , who is at bedside, reports she often skips meals.  All questions answered and patient had no further complaints.    Objective:     Vital Signs (Most Recent):  Temp: 97.5 °F (36.4 °C) (02/03/24 1213)  Pulse: 65 (02/03/24 1213)  Resp: 20 (02/03/24 1213)  BP: (!) 118/56 (02/03/24 1213)  SpO2: 98 % (02/03/24 1213) Vital Signs (24h Range):  Temp:  [97.5 °F (36.4 °C)-97.8 °F (36.6 °C)] 97.5 °F (36.4 °C)  Pulse:  [41-77] 65  Resp:  [16-20] 20  SpO2:  [98 %-100 %] 98 %  BP: (118-144)/(56-65) 118/56     Weight: 40.7 kg (89 lb 11.6 oz)  Body mass index is 18.12 kg/m².    Intake/Output Summary (Last 24 hours) at 2/3/2024 1403  Last data filed at 2/3/2024 0228  Gross per 24 hour   Intake 1000 ml   Output --   Net 1000 ml         Physical Exam  Vitals and nursing note reviewed.   Constitutional:       General: She is not in acute distress.     Appearance: She is well-developed. She is ill-appearing. She is not toxic-appearing or diaphoretic.      Comments: Thin and frail   HENT:      Head: Normocephalic and atraumatic.   Eyes:      General: No scleral icterus.        Right eye: No discharge.         Left eye: No discharge.      Conjunctiva/sclera: Conjunctivae normal.   Neck:      Trachea: No tracheal deviation.   Cardiovascular:      Rate and Rhythm: Normal rate and regular rhythm.      Heart sounds: Normal heart sounds. No murmur heard.     No gallop.   Pulmonary:      Effort: Pulmonary effort is normal. No respiratory distress.      Breath sounds: Normal breath sounds. No stridor. No wheezing or rales.   Abdominal:      General: There is no distension.      Palpations: Abdomen is soft. There is no mass.      Tenderness: There is no abdominal tenderness. There is no guarding.      Comments: Bowel sounds hyperactive   Musculoskeletal:         General: No deformity. Normal range of motion.      Cervical back: " Normal range of motion and neck supple.   Skin:     General: Skin is warm and dry.      Coloration: Skin is not pale.      Findings: No erythema or rash.   Neurological:      General: No focal deficit present.      Mental Status: She is alert and oriented to person, place, and time.      Cranial Nerves: No cranial nerve deficit.      Motor: No abnormal muscle tone.   Psychiatric:      Comments: anxious         Significant Labs: All pertinent labs within the past 24 hours have been reviewed.    Significant Imaging: I have reviewed all pertinent imaging results/findings within the past 24 hours.    Assessment/Plan:      * Enteritis  -Placed in observation  -Presented with abdominal pain, gas, and profuse diarrhea   -CT abdomen/pelvis showed enteritis and intrahepatic biliary dilatation   -No leukocytosis.  Procal barely elevated  -Suspect viral etiology as most likely cause.  Appreciate input from GI.  -Stop zosyn which could worsen her diarrhea  -Await fecal wbc, C.diff and culture  -Add probiotic and OK for imodum  -Continue gentle IV fluids and and advance diet.  Tolerated breakfast well.    Unintentional weight loss  -Patient with marked unintentional weight loss over last year  -Body mass index is 18.12 kg/m².  -Reports had C-scope in last several years which was normal but for 1 polyp  -Reports has regular mammograms and pap smears -   -Notes significant anxiety and frequent worry.  -Consult nutrition  -Add boost with meals  -Needs outpatient GI follow up for MRCP  -Consult psychiatry to discuss her anxiety and worry    Dilation of biliary tract  -Noted on CT abdomen/pelvis  -Tbili, AST and ALT all normal  -Agree with GI recs for outpatient eval with MRCP    Protein-calorie malnutrition  -Treatment as above.      VTE Risk Mitigation (From admission, onward)           Ordered     heparin (porcine) injection 5,000 Units  Every 8 hours         02/03/24 0427     IP VTE HIGH RISK PATIENT  Once         02/03/24 7513      Place sequential compression device  Until discontinued         02/03/24 0427                    Discharge Planning   KAYLA:      Code Status: Full Code   Is the patient medically ready for discharge?:     Reason for patient still in hospital (select all that apply): Treatment  Discharge Plan A: Home with family                  Tai Sanchez MD  Department of Hospital Medicine   [facility] Surg ([address])

## 2024-02-03 NOTE — SUBJECTIVE & OBJECTIVE
"Interval History: No acute events overnight.  Has had no further abdominal pain or diarrhea since admit.  No recent antibiotics and no one around has any similar illness.  Notes significant unintentional weight loss in last year.  Reports significant "worrying" and , who is at bedside, reports she often skips meals.  All questions answered and patient had no further complaints.    Objective:     Vital Signs (Most Recent):  Temp: 97.5 °F (36.4 °C) (02/03/24 1213)  Pulse: 65 (02/03/24 1213)  Resp: 20 (02/03/24 1213)  BP: (!) 118/56 (02/03/24 1213)  SpO2: 98 % (02/03/24 1213) Vital Signs (24h Range):  Temp:  [97.5 °F (36.4 °C)-97.8 °F (36.6 °C)] 97.5 °F (36.4 °C)  Pulse:  [41-77] 65  Resp:  [16-20] 20  SpO2:  [98 %-100 %] 98 %  BP: (118-144)/(56-65) 118/56     Weight: 40.7 kg (89 lb 11.6 oz)  Body mass index is 18.12 kg/m².    Intake/Output Summary (Last 24 hours) at 2/3/2024 1403  Last data filed at 2/3/2024 0228  Gross per 24 hour   Intake 1000 ml   Output --   Net 1000 ml         Physical Exam  Vitals and nursing note reviewed.   Constitutional:       General: She is not in acute distress.     Appearance: She is well-developed. She is ill-appearing. She is not toxic-appearing or diaphoretic.      Comments: Thin and frail   HENT:      Head: Normocephalic and atraumatic.   Eyes:      General: No scleral icterus.        Right eye: No discharge.         Left eye: No discharge.      Conjunctiva/sclera: Conjunctivae normal.   Neck:      Trachea: No tracheal deviation.   Cardiovascular:      Rate and Rhythm: Normal rate and regular rhythm.      Heart sounds: Normal heart sounds. No murmur heard.     No gallop.   Pulmonary:      Effort: Pulmonary effort is normal. No respiratory distress.      Breath sounds: Normal breath sounds. No stridor. No wheezing or rales.   Abdominal:      General: There is no distension.      Palpations: Abdomen is soft. There is no mass.      Tenderness: There is no abdominal tenderness. " There is no guarding.      Comments: Bowel sounds hyperactive   Musculoskeletal:         General: No deformity. Normal range of motion.      Cervical back: Normal range of motion and neck supple.   Skin:     General: Skin is warm and dry.      Coloration: Skin is not pale.      Findings: No erythema or rash.   Neurological:      General: No focal deficit present.      Mental Status: She is alert and oriented to person, place, and time.      Cranial Nerves: No cranial nerve deficit.      Motor: No abnormal muscle tone.   Psychiatric:      Comments: anxious         Significant Labs: All pertinent labs within the past 24 hours have been reviewed.    Significant Imaging: I have reviewed all pertinent imaging results/findings within the past 24 hours.

## 2024-02-03 NOTE — SUBJECTIVE & OBJECTIVE
Past Medical History:   Diagnosis Date    Abnormal weight loss 7/23/2017    Arthritis 12/30/2018    Complication of anesthesia 2009    colonoscopy-cardiac arrest from anes    Elevated TSH 7/23/2017    Hypercholesteremia 7/23/2017    Osteoporosis, post-menopausal     Vitamin D deficiency 7/23/2017       Past Surgical History:   Procedure Laterality Date    COLONOSCOPY  2016    KNEE ARTHROPLASTY  1985    TUBAL LIGATION         Review of patient's allergies indicates:   Allergen Reactions    Aspirin Swelling    Augmentin [amoxicillin-pot clavulanate] Diarrhea    Tessalon [benzonatate] Rash       No current facility-administered medications on file prior to encounter.     Current Outpatient Medications on File Prior to Encounter   Medication Sig    ascorbic acid, vitamin C, (VITAMIN C) 100 MG tablet Take 100 mg by mouth once daily.    calcium carbonate 1250 MG capsule Take 1,250 mg by mouth 2 (two) times daily with meals.    CYANOCOBALAMIN, VITAMIN B-12, (VITAMIN B-12 ORAL) Take 2,500 mg by mouth every morning. OTC    diclofenac sodium (VOLTAREN) 1 % Gel Apply 2 g topically 4 (four) times daily.    ergocalciferol, vitamin D2, (VITAMIN D ORAL) Take by mouth.    fish oil-omega-3 fatty acids 300-1,000 mg capsule Take 2 g by mouth once daily.    multivitamin (THERAGRAN) per tablet Take 1 tablet by mouth once daily.       Family History       Problem Relation (Age of Onset)    Cancer Sister, Maternal Grandmother    Coronary artery disease Father    Heart attack Brother    No Known Problems Daughter, Son    Ovarian cancer Sister (45)    Parkinsonism Mother    Stroke Paternal Grandfather          Tobacco Use    Smoking status: Never    Smokeless tobacco: Never   Substance and Sexual Activity    Alcohol use: Yes     Comment: 1/2 glass wine occasionally    Drug use: No    Sexual activity: Yes     Partners: Male     Birth control/protection: Post-menopausal     Review of Systems   Constitutional:  Positive for appetite change.  Negative for activity change, chills, diaphoresis and fever (subjective).   Respiratory:  Negative for cough, shortness of breath and wheezing.    Cardiovascular:  Negative for chest pain and palpitations.   Gastrointestinal:  Positive for abdominal pain, diarrhea and nausea. Negative for abdominal distention, constipation and vomiting.   Genitourinary:  Negative for decreased urine volume, dysuria, flank pain, frequency, hematuria and urgency.   Musculoskeletal:  Negative for arthralgias, myalgias, neck pain and neck stiffness.   Skin:  Negative for color change, pallor and rash.   Neurological:  Negative for dizziness, weakness, light-headedness and headaches.   Psychiatric/Behavioral:  Negative for agitation and confusion.      Objective:     Vital Signs (Most Recent):  Temp: 97.6 °F (36.4 °C) (02/03/24 0602)  Pulse: 66 (02/03/24 0601)  Resp: 16 (02/03/24 0601)  BP: 129/63 (02/03/24 0602)  SpO2: 99 % (02/03/24 0601) Vital Signs (24h Range):  Temp:  [97.6 °F (36.4 °C)-97.8 °F (36.6 °C)] 97.6 °F (36.4 °C)  Pulse:  [41-77] 66  Resp:  [16-19] 16  SpO2:  [99 %-100 %] 99 %  BP: (129-144)/(63-76) 129/63     Weight: 40.7 kg (89 lb 11.6 oz)  Body mass index is 18.12 kg/m².     Physical Exam  Vitals and nursing note reviewed.   Constitutional:       General: She is not in acute distress.     Appearance: Normal appearance. She is well-developed and normal weight. She is not ill-appearing, toxic-appearing or diaphoretic.   HENT:      Head: Normocephalic and atraumatic.   Eyes:      General: No scleral icterus.        Right eye: No discharge.         Left eye: No discharge.      Conjunctiva/sclera: Conjunctivae normal.   Neck:      Trachea: No tracheal deviation.   Cardiovascular:      Rate and Rhythm: Normal rate and regular rhythm.      Heart sounds: Normal heart sounds. No murmur heard.     No gallop.   Pulmonary:      Effort: Pulmonary effort is normal. No respiratory distress.      Breath sounds: Normal breath sounds. No  "stridor. No wheezing or rales.   Abdominal:      General: Bowel sounds are normal. There is no distension.      Palpations: Abdomen is soft. There is no mass.      Tenderness: There is no abdominal tenderness (generalized, R>L). There is no guarding.   Musculoskeletal:         General: No deformity. Normal range of motion.      Cervical back: Normal range of motion and neck supple.   Skin:     General: Skin is warm and dry.      Coloration: Skin is not pale.      Findings: No erythema or rash.   Neurological:      General: No focal deficit present.      Mental Status: She is alert and oriented to person, place, and time.      Cranial Nerves: No cranial nerve deficit.      Motor: No abnormal muscle tone.   Psychiatric:         Mood and Affect: Mood normal.         Behavior: Behavior normal.         Thought Content: Thought content normal.         Judgment: Judgment normal.                Significant Labs: All pertinent labs within the past 24 hours have been reviewed.  BMP:   Recent Labs   Lab 02/03/24  0540   GLU 89      K 3.7   *   CO2 21*   BUN 17   CREATININE 0.7   CALCIUM 8.2*   MG 2.0     CBC:   Recent Labs   Lab 02/03/24 0057 02/03/24  0540   WBC 5.91 4.57   HGB 14.7 13.4   HCT 43.9 39.7    184     CMP:   Recent Labs   Lab 02/03/24 0057 02/03/24  0540    143   K 4.6 3.7    113*   CO2 26 21*   GLU 89 89   BUN 23 17   CREATININE 0.8 0.7   CALCIUM 9.4 8.2*   PROT 7.1  --    ALBUMIN 3.7  --    BILITOT 0.5  --    ALKPHOS 53*  --    AST 36  --    ALT 27  --    ANIONGAP 11 9     Urine Culture: No results for input(s): "LABURIN" in the last 48 hours.  Urine Studies:   Recent Labs   Lab 02/03/24  0347   COLORU Yellow   APPEARANCEUA Clear   PHUR 7.0   SPECGRAV <=1.005*   PROTEINUA Negative   GLUCUA Negative   KETONESU Negative   BILIRUBINUA Negative   OCCULTUA Negative   NITRITE Negative   UROBILINOGEN Negative   LEUKOCYTESUR 1+*   RBCUA 0   WBCUA 5   BACTERIA Rare   SQUAMEPITHEL 1 "       Significant Imaging: I have reviewed all pertinent imaging results/findings within the past 24 hours.      Imaging Results              CT Abdomen Pelvis With IV Contrast NO Oral Contrast (Final result)  Result time 02/03/24 01:55:43      Final result by Jeanne Guo MD (02/03/24 01:55:43)                   Impression:      Multiple nondilated small bowel loops containing fluid and air fluid levels.  Findings may be related to diarrheal illness and or enteritis.  Other etiology cannot be entirely excluded.    Mild intrahepatic biliary ductal dilatation.  Consider nonemergent outpatient MRCP or ERCP.    Colonic diverticulosis.      Electronically signed by: Jeanne Guo  Date:    02/03/2024  Time:    01:55               Narrative:    EXAMINATION:  CT OF ABDOMEN PELVIS WITH    CLINICAL HISTORY:  Abdominal abscess/infection suspected;    TECHNIQUE:  5 mm enhanced axial images were obtained from the lung bases through the greater trochanters.  Seventy-five mL of Omnipaque 350 was injected.    COMPARISON:  None.    FINDINGS:  Mild intrahepatic biliary ductal dilatation is seen.  There are no definite hepatic lesions.    Spleen, pancreas, and adrenal glands are unremarkable. The gallbladder contains no calcified gallstones.    The renal pelves are prominent or there are extrarenal pelves.    There are nondilated multiple small bowel loops containing fluid and air fluid levels.    There is no definite evidence for abdominal adenopathy or ascites.  There is a tiny fat containing umbilical hernia.    There are no pelvic masses or adenopathy.  Colonic diverticulosis is seen.    There is no free fluid in the pelvis.    There is moderate bibasilar atelectasis.

## 2024-02-03 NOTE — ASSESSMENT & PLAN NOTE
-Patient with marked unintentional weight loss over last year  -Body mass index is 18.12 kg/m².  -Reports had C-scope in last several years which was normal but for 1 polyp  -Reports has regular mammograms and pap smears -   -Notes significant anxiety and frequent worry.  -Consult nutrition  -Add boost with meals  -Needs outpatient GI follow up for MRCP  -Consult psychiatry to discuss her anxiety and worry

## 2024-02-03 NOTE — TELEPHONE ENCOUNTER
Reason for Disposition   Black or tarry bowel movements  (Exception: Chronic-unchanged black-grey BMs AND is taking iron pills or Pepto-Bismol.)    Additional Information   Negative: Shock suspected (e.g., cold/pale/clammy skin, too weak to stand, low BP, rapid pulse)   Negative: Difficult to awaken or acting confused (e.g., disoriented, slurred speech)   Negative: Sounds like a life-threatening emergency to the triager   Negative: [1] SEVERE abdominal pain (e.g., excruciating) AND [2] present > 1 hour   Negative: [1] SEVERE abdominal pain AND [2] age > 60 years   Negative: [1] Blood in the stool AND [2] moderate or large amount of blood    Protocols used: Diarrhea-A-AH  pt called re sx started tues am and then stopped. started again yest(thurs) am. diarrhea x2-3 yest am at 0630. seen by dr today. dx stomach virus. only drank water yest, no solid food. took Imodium once last pm and once today- last dose took one at 815pm then second pill at 915pm then still passed stool again. pt states diarrhea again at 7pm started after eating mashed pot, toast and banana. No blood in stool. taking pepto on tues and thurs for abd pain. dx4-5 today. pain below magdalena button. rating pain= 5-6. Pain comes and goes. Afeb. burping. drank Gatorade and sprite- now only drinking water. last uop around 7pm. not dizzy. pt states she has lost wt. pt states her stool is black. Pt reports she mentioned this in OV and thought to be due to pepto. Rec ED. pt insists on speaking to dr on call. spoke with dr hunter rec ED. pt agrees.   pharm; [employer].

## 2024-02-03 NOTE — ED PROVIDER NOTES
"Encounter date: 11:50 PM 02/03/2024    Source of History   Patient/    Chief Complaint   Pt presents with:   Diarrhea (Endorses diarrhea since Tuesday. States that she has been taking OTC meds with no relief. Endorses loose dark stools. Denies korin blood in stool.)      History Of Present Illness   Alicia Toussaint is a 73 y.o. female with history of arthritis, osteoporosis, and abnormal weight loss in 2017 who presents to the ED with chief complaint of abdominal pain and diarrhea with onset 3 days ago. She reports that the abdominal pain feels like a cramping sensation. She notes that after eating she has diarrhea, but when she does not eat the diarrhea resolves. She adds that the diarrhea is of "watery" consistency. She states that she took Pepto Bismol and Imodium. She notes that after taking the Pepto Bismol her stool was dark. She was seen by her PCP yesterday and was advised to come to the ED. She denies nausea, vomiting, decreased urine output, fever, and chills. She also denies any new foods, recent distant travel, and history of cancer.    This is the extent to the patients complaints today here in the emergency department.  Past History     Review of patient's allergies indicates:   Allergen Reactions    Aspirin Swelling    Augmentin [amoxicillin-pot clavulanate] Diarrhea    Tessalon [benzonatate] Rash       No current facility-administered medications on file prior to encounter.     Current Outpatient Medications on File Prior to Encounter   Medication Sig Dispense Refill    ascorbic acid, vitamin C, (VITAMIN C) 100 MG tablet Take 100 mg by mouth once daily.      calcium carbonate 1250 MG capsule Take 1,250 mg by mouth 2 (two) times daily with meals.      CYANOCOBALAMIN, VITAMIN B-12, (VITAMIN B-12 ORAL) Take 2,500 mg by mouth every morning. OTC      diclofenac sodium (VOLTAREN) 1 % Gel Apply 2 g topically 4 (four) times daily. 100 g 2    ergocalciferol, vitamin D2, (VITAMIN D ORAL) Take by mouth.   "    fish oil-omega-3 fatty acids 300-1,000 mg capsule Take 2 g by mouth once daily.      multivitamin (THERAGRAN) per tablet Take 1 tablet by mouth once daily.           As per HPI and below:  Past Medical History:   Diagnosis Date    Abnormal weight loss 7/23/2017    Arthritis 12/30/2018    Complication of anesthesia 2009    colonoscopy-cardiac arrest from anes    Elevated TSH 7/23/2017    Hypercholesteremia 7/23/2017    Osteoporosis, post-menopausal     Vitamin D deficiency 7/23/2017     Past Surgical History:   Procedure Laterality Date    COLONOSCOPY  2016    KNEE ARTHROPLASTY  1985    TUBAL LIGATION         Social History     Socioeconomic History    Marital status:    Tobacco Use    Smoking status: Never    Smokeless tobacco: Never   Substance and Sexual Activity    Alcohol use: Yes     Comment: 1/2 glass wine occasionally    Drug use: No    Sexual activity: Yes     Partners: Male     Birth control/protection: Post-menopausal     Social Determinants of Health     Financial Resource Strain: Low Risk  (2/28/2023)    Overall Financial Resource Strain (CARDIA)     Difficulty of Paying Living Expenses: Not hard at all   Food Insecurity: No Food Insecurity (2/28/2023)    Hunger Vital Sign     Worried About Running Out of Food in the Last Year: Never true     Ran Out of Food in the Last Year: Never true   Transportation Needs: No Transportation Needs (2/28/2023)    PRAPARE - Transportation     Lack of Transportation (Medical): No     Lack of Transportation (Non-Medical): No   Physical Activity: Sufficiently Active (2/28/2023)    Exercise Vital Sign     Days of Exercise per Week: 5 days     Minutes of Exercise per Session: 50 min   Stress: No Stress Concern Present (2/28/2023)    [employer] [place] [employer] - Occupational Stress Questionnaire     Feeling of Stress : Not at all   Social Connections: Moderately Integrated (2/28/2023)    Social Connection and Isolation Panel [NHANES]     Frequency of  "Communication with Friends and Family: Three times a week     Frequency of Social Gatherings with Friends and Family: Twice a week     Attends [employer] Services: 1 to 4 times per year     Active Member of Clubs or Organizations: No     Attends Club or Organization Meetings: Never     Marital Status:    Housing Stability: Unknown (2/28/2023)    Housing Stability Vital Sign     Unable to Pay for Housing in the Last Year: No     Unstable Housing in the Last Year: No       Family History   Problem Relation Age of Onset    Parkinsonism Mother     Coronary artery disease Father     Ovarian cancer Sister 45    Cancer Sister         ovarian    Heart attack Brother     No Known Problems Daughter     No Known Problems Son     Cancer Maternal Grandmother     Stroke Paternal Grandfather     Breast cancer Neg Hx     Colon cancer Neg Hx        Physical Exam     Vitals:    02/02/24 2323   BP: 139/65   Patient Position: Sitting   Pulse: 77   Resp: 19   Temp: 97.7 °F (36.5 °C)   TempSrc: Oral   SpO2: 99%   Weight: 40.7 kg (89 lb 11.6 oz)   Height: 4' 11" (1.499 m)     Physical Exam:   Nursing note and vitals reviewed.  Appearance: Well-appearing, nontoxic female in no acute respiratory distress.  Making purposeful movements.  Speaking in full sentences.  Skin: No obvious rashes seen.  Good turgor.  No abrasions.  No ecchymoses.  Eyes: No conjunctival injection. EOMI, PERRL.  Head: NC/AT  Chest: CTAB. No increased work of breathing, bilateral chest rise.  Cardiovascular: Regular rate and rhythm.  Normal equal bilateral radial pulses.  Abdomen: Soft.  Not distended.  Nontender.  No guarding.  No rebound. No Masses. No CVA tenderness.  Musculoskeletal: No obvious deformities.   Neck supple, full range of motion, no obvious deformity.   No tenderness to palpation of cervical through lumbar spine.  No step-offs or deformities. Good range of motion all joints.  Neurologic: Moves all extremities.  Normal sensation.  No facial " droop.  Normal speech.    Mental Status:  Alert and oriented x 3.  Appropriate, conversant.      Results and Medications    Procedures    Labs Reviewed   COMPREHENSIVE METABOLIC PANEL - Abnormal; Notable for the following components:       Result Value    Alkaline Phosphatase 53 (*)     All other components within normal limits   CLOSTRIDIUM DIFFICILE   CULTURE, STOOL   CULTURE, BLOOD   CULTURE, BLOOD   CBC W/ AUTO DIFFERENTIAL   LIPASE   URINALYSIS, REFLEX TO URINE CULTURE   H. PYLORI ANTIGEN, STOOL   PANCREATIC ELASTASE, FECAL   FECAL FAT, QUALITATIVE   OCCULT BLOOD X 1, STOOL   WBC, STOOL   ROTAVIRUS ANTIGEN, STOOL   CALPROTECTIN, STOOL   GIARDIA/CRYPTOSPORIDIUM (EIA)   STOOL EXAM-OVA,CYSTS,PARASITES   PROCALCITONIN   ISTAT CREATININE       Imaging Results              CT Abdomen Pelvis With IV Contrast NO Oral Contrast (Final result)  Result time 02/03/24 01:55:43      Final result by Jeanne Guo MD (02/03/24 01:55:43)                   Impression:      Multiple nondilated small bowel loops containing fluid and air fluid levels.  Findings may be related to diarrheal illness and or enteritis.  Other etiology cannot be entirely excluded.    Mild intrahepatic biliary ductal dilatation.  Consider nonemergent outpatient MRCP or ERCP.    Colonic diverticulosis.      Electronically signed by: Jeanne Guo  Date:    02/03/2024  Time:    01:55               Narrative:    EXAMINATION:  CT OF ABDOMEN PELVIS WITH    CLINICAL HISTORY:  Abdominal abscess/infection suspected;    TECHNIQUE:  5 mm enhanced axial images were obtained from the lung bases through the greater trochanters.  Seventy-five mL of Omnipaque 350 was injected.    COMPARISON:  None.    FINDINGS:  Mild intrahepatic biliary ductal dilatation is seen.  There are no definite hepatic lesions.    Spleen, pancreas, and adrenal glands are unremarkable. The gallbladder contains no calcified gallstones.    The renal pelves are prominent or there are  extrarenal pelves.    There are nondilated multiple small bowel loops containing fluid and air fluid levels.    There is no definite evidence for abdominal adenopathy or ascites.  There is a tiny fat containing umbilical hernia.    There are no pelvic masses or adenopathy.  Colonic diverticulosis is seen.    There is no free fluid in the pelvis.    There is moderate bibasilar atelectasis.                                          Medications   sodium chloride 0.9% bolus 1,000 mL 1,000 mL (0 mLs Intravenous Stopped 2/3/24 0228)   iohexoL (OMNIPAQUE 350) injection 75 mL (75 mLs Intravenous Given 2/3/24 0120)       MDM, Impression and Plan   Clinical Tests:   Lab Tests: Ordered and Reviewed  Radiological Study: Ordered and Reviewed  Medical Tests: Ordered and Reviewed    Differential diagnosis:  -Acute gastroenteritis   -GERD  -Gallbladder pathology  -unlikely PNA  -unlikely Bowel obstruction vs bowel Perforation  -Appendicitis  -thought was given to GI bleed      Initial Assessment & ED Management:    Urgent evaluation of 73 y.o. female with history of arthritis, osteoporosis, and abnormal weight loss in 2017 who presents to the ED with chief complaint of abdominal pain and diarrhea with onset 3 days ago. She presents afebrile and hemodynamically stable.  Rectal exam performed with nursing chaperone shows dark stool that is guaiac negative.  Normal external genitalia.  Patient refused pain medicine.  CBC CMP and lipase grossly unrevealing.  Her CT abdomen and pelvis shows signs of developing bowel obstruction versus enteritis, noted mild intrahepatic biliary duct dilation with recommended outpatient MRCP versus ERCP.  Had shared decision-making with patient.  She feels that she has not been able to keep up with her fluid intake.  Blood cultures were drawn due to concern for infectious etiology, yet empiric antibiotics were not started in the ED. I called discussed case with Hospital Medicine who was agreeable with  admission, recommended protocol be added on with pending blood cultures.  Patient deemed stable for admission to hospital medicine.    Medical Decision Making  Amount and/or Complexity of Data Reviewed  Labs: ordered.  Radiology: ordered.    Risk  Prescription drug management.               I called and discussed the case with:  Hospital Medicine    Please see ED course for discussion of workup and dispo if not listed above.          Final diagnoses:  [K52.9] Enteritis        ED Disposition Condition    Observation Stable               ED Course as of 02/03/24 0338   Sat Feb 03, 2024   0320 Secure messaged   []   0328 Called  with no response  [HM]      ED Course User Index  [HM] Ismael Florez MD          I, Mary Jo Albright, scribed for, and in the presence of, Ismael Florez MD. I performed the scribed service and the documentation accurately describes the services I performed. I attest to the accuracy of the note.     Physician Attestation for Scribe: I, Ismael Florez MD , reviewed documentation as scribed in my presence, which is both accurate and complete.      MD Gautam Gilman Heyward B, MD  02/03/24 0338

## 2024-02-03 NOTE — PLAN OF CARE
Inpatient Upgrade Note    Alicia Toussaint has warranted treatment spanning two or more midnights of hospital level care for the management of {2 Midnight Problems:49756}. She continues to require {2 Midnight Services:11298}. Her condition is also complicated by the following comorbidities: {2 Midnight Comorbidtities:18081}.

## 2024-02-03 NOTE — ASSESSMENT & PLAN NOTE
- Ms. Alicia Toussaint presents with abdominal pain, gas, and diarrhea   - the diarrhea has been intermittently relieved by use of Pepto-Bismol and Imodium   - Labs are overall unremarkable   - CT shows enteritis and intrahepatic biliary dilatation   - NPO  - hydrate w/ IVF   - prn bentyl  - will cover with zosyn as procal is mildly elevated, but have greater suspicion this is not bacterial   - C. Diff and stool studies pending   - consult GI

## 2024-02-03 NOTE — HPI
Ms. Alicia Toussaint is a 73 y.o. female, with PMH of prediabetes, who [place] [facility] [place] due to diarrhea and abdominal pain. She states the pain began on Tuesday, was associated with watery diarrhea, and she attributed it to something she ate. She took Pepto-Bismol and the symptoms resolved. She was able to eat on Wednesday, but has had reduced appetite. On Thursday she ate some chicken, and the pain and diarrhea returned. She has tried taking imodium, but symptoms were only relieved temporarily. She denies sick contacts, recent travel, consumption of raw/undercooked foods. Yesterday she had an appointment with another doctor in her PCP's office, where she reported she had 6 watery bowel movements with the final one occurring at 1330 yesterday, and being stopped by imodium. She reported associated abdominal/gas pain, and mild nausea without vomiting. She states she has been tolerating water, and has had a subjective fever. She denied headache, chills, night sweats, lightheadedness, dizziness. She was evaluated in the ED with labs showing no leukocytosis or left shift.  A metabolic panel showed no significant abnormalities. A procalcitonin was mildly elevated at 0.25. UA was without UTI. A CT of the abdomen and pelvis showed findings concerning for enteritis of the small bowel, and mild intrahepatic biliary ductal dilatation. She was placed on observation.

## 2024-02-03 NOTE — ASSESSMENT & PLAN NOTE
-Placed in observation  -Presented with abdominal pain, gas, and profuse diarrhea   -CT abdomen/pelvis showed enteritis and intrahepatic biliary dilatation   -No leukocytosis.  Procal barely elevated  -Suspect viral etiology as most likely cause.  Appreciate input from GI.  -Stop zosyn which could worsen her diarrhea  -Await fecal wbc, C.diff and culture  -Check covid as recent strains have seemed to have a GI predominance in symptoms  -Add probiotic and OK for imodum  -Continue gentle IV fluids and and advance diet.  Tolerated breakfast well.

## 2024-02-03 NOTE — CONSULTS
Gastroenterology Consult    2/3/2024 1:54 PM    Patient Name: Alicia Toussaint  MRN: [id number]  Admission Date: 2/2/2024  Hospital Length of Stay: 0 days  Code Status: Full Code   Primary Care Physician: Ranjit Sweeney MD  Principal Problem:Enteritis  Consulting Physician: Consults  Reason for consultation: enteritis    HPI:  Alicia Toussaint is a 73 y.o. female with a history of arthritis, HLD, osteoporosis and hypothyroidism who presented with diarrhea.  She had some Tuesday, it stopped.  She was able to eat on Wednesday but then diarrhea recurred Thursday and has been persistent.  She did wake up early in the AM to have a bowel movement but no nocturnal BM.  No blood in the stool.  She denies abdominal pain but there is a lot of gurgling.  No N/V, F/C or eating raw/undercooked foods.  No sick contacts.  She denies any new medications or OTC/herbal supplements.  No family history of IBD.  She had a colonoscopy within the last 5 years with 1 polyp.      Past Medical History:   Diagnosis Date    Abnormal weight loss 7/23/2017    Arthritis 12/30/2018    Complication of anesthesia 2009    colonoscopy-cardiac arrest from anes    Elevated TSH 7/23/2017    Hypercholesteremia 7/23/2017    Osteoporosis, post-menopausal     Vitamin D deficiency 7/23/2017       Past Surgical History:   Procedure Laterality Date    COLONOSCOPY  2016    KNEE ARTHROPLASTY  1985    TUBAL LIGATION         Social History     Tobacco Use    Smoking status: Never    Smokeless tobacco: Never   Substance Use Topics    Alcohol use: Yes     Comment: 1/2 glass wine occasionally    Drug use: No        Family History   Problem Relation Age of Onset    Parkinsonism Mother     Coronary artery disease Father     Ovarian cancer Sister 45    Cancer Sister         ovarian    Heart attack Brother     No Known Problems Daughter     No Known Problems Son     Cancer Maternal Grandmother     Stroke Paternal Grandfather     Breast cancer Neg Hx     Colon cancer Neg Hx   "        Review of patient's allergies indicates:   Allergen Reactions    Aspirin Swelling    Augmentin [amoxicillin-pot clavulanate] Diarrhea    Tessalon [benzonatate] Rash         Current Facility-Administered Medications:     0.9%  NaCl infusion, , Intravenous, Continuous, Aleshia Guzman PA-C, Last Rate: 125 mL/hr at 02/03/24 1033, New Bag at 02/03/24 1033    acetaminophen tablet 650 mg, 650 mg, Oral, Q6H PRN, Aleshia Guzman PA-C    dicyclomine capsule 10 mg, 10 mg, Oral, QID PRN, Aleshia Guzman PA-C    heparin (porcine) injection 5,000 Units, 5,000 Units, Subcutaneous, Q8H, Aleshia Guzman PA-C, 5,000 Units at 02/03/24 0542    lactobacillus acidophilus & bulgar 100 million cell packet 1 each, 1 packet, Oral, BID, Tai Sanchez MD, 1 each at 02/03/24 1034    loperamide capsule 2 mg, 2 mg, Oral, QID PRN, Tai Sanchez MD    melatonin tablet 6 mg, 6 mg, Oral, Nightly PRN, Aleshia Guzman PA-C    naloxone 0.4 mg/mL injection 0.02 mg, 0.02 mg, Intravenous, PRN, Aleshia Guzman PA-C    senna-docusate 8.6-50 mg per tablet 1 tablet, 1 tablet, Oral, BID PRN, Aleshia Guzman PA-C    sodium chloride 0.9% flush 10 mL, 10 mL, Intravenous, Q8H, Aleshia Guzman PA-C, 10 mL at 02/03/24 0601    Review of Systems   Gastrointestinal:  Positive for abdominal pain (gurgling) and diarrhea.   All other systems reviewed and are negative.      BP (!) 118/56 (BP Location: Left arm, Patient Position: Lying)   Pulse 65   Temp 97.5 °F (36.4 °C) (Oral)   Resp 20   Ht 4' 11" (1.499 m)   Wt 40.7 kg (89 lb 11.6 oz)   LMP  (LMP Unknown)   SpO2 98%   BMI 18.12 kg/m²   Physical Exam  Constitutional:       General: She is not in acute distress.     Appearance: Normal appearance.   HENT:      Head: Normocephalic and atraumatic.      Right Ear: External ear normal.      Left Ear: External ear normal.      Nose: Nose normal.      Mouth/Throat:      Mouth: Mucous membranes are moist.      " Pharynx: Oropharynx is clear.   Eyes:      General: No scleral icterus.     Extraocular Movements: Extraocular movements intact.      Conjunctiva/sclera: Conjunctivae normal.   Cardiovascular:      Rate and Rhythm: Normal rate and regular rhythm.      Heart sounds: Normal heart sounds. No murmur heard.  Pulmonary:      Effort: Pulmonary effort is normal. No respiratory distress.      Breath sounds: Normal breath sounds. No wheezing.   Abdominal:      General: Bowel sounds are normal. There is distension (mild).      Palpations: Abdomen is soft.      Tenderness: There is no abdominal tenderness. There is no guarding or rebound.   Musculoskeletal:         General: No deformity.      Right lower leg: No edema.      Left lower leg: No edema.   Skin:     General: Skin is warm and dry.      Findings: No rash.   Neurological:      General: No focal deficit present.      Mental Status: She is alert and oriented to person, place, and time. Mental status is at baseline.      Sensory: No sensory deficit.   Psychiatric:         Mood and Affect: Mood normal.         Thought Content: Thought content normal.         Labs:  Lab Results   Component Value Date/Time    WBC 4.57 02/03/2024 05:40 AM    HGB 13.4 02/03/2024 05:40 AM    HCT 39.7 02/03/2024 05:40 AM     02/03/2024 05:40 AM    MCV 89 02/03/2024 05:40 AM     02/03/2024 05:40 AM    K 3.7 02/03/2024 05:40 AM     (H) 02/03/2024 05:40 AM    CO2 21 (L) 02/03/2024 05:40 AM    BUN 17 02/03/2024 05:40 AM    CREATININE 0.7 02/03/2024 05:40 AM    GLU 89 02/03/2024 05:40 AM    CALCIUM 8.2 (L) 02/03/2024 05:40 AM    MG 2.0 02/03/2024 05:40 AM   ]  Lab Results   Component Value Date/Time    PROT 7.1 02/03/2024 12:57 AM    ALBUMIN 3.7 02/03/2024 12:57 AM    BILITOT 0.5 02/03/2024 12:57 AM    AST 36 02/03/2024 12:57 AM    ALT 27 02/03/2024 12:57 AM    ALKPHOS 53 (L) 02/03/2024 12:57 AM   ]    Imaging and Procedures:  I personally reviewed the imaging/procedures below.  CT  A/P 2/3/24:  Multiple nondilated small bowel loops containing fluid and air fluid levels.  Findings may be related to diarrheal illness and or enteritis.  Other etiology cannot be entirely excluded.  Mild intrahepatic biliary ductal dilatation.  Consider nonemergent outpatient MRCP or ERCP.  Colonic diverticulosis.    Assessment:  Alicia Toussaint is a 73 y.o. female with a history of arthritis, HLD, osteoporosis and hypothyroidism who presented with diarrhea.  Enteritis on CT.     Plan:  - IV fluids  - anti-emetics PRN  - bentyl PRN  - follow up stool studies  - if C. Diff negative, can try imodium  - diet as tolerated  - suspect this is infectious, possibly viral, and will resolve w/ supportive care  - can address biliary dilation with outpatient MRCP - doubt this is causing symptoms and LFTs are normal    Bethany Castellano MD

## 2024-02-04 VITALS
DIASTOLIC BLOOD PRESSURE: 57 MMHG | RESPIRATION RATE: 18 BRPM | BODY MASS INDEX: 18.09 KG/M2 | HEIGHT: 59 IN | TEMPERATURE: 98 F | SYSTOLIC BLOOD PRESSURE: 121 MMHG | OXYGEN SATURATION: 99 % | WEIGHT: 89.75 LBS | HEART RATE: 58 BPM

## 2024-02-04 PROBLEM — E43 SEVERE MALNUTRITION: Status: ACTIVE | Noted: 2024-02-03

## 2024-02-04 LAB
ANION GAP SERPL CALC-SCNC: 4 MMOL/L (ref 8–16)
BASOPHILS # BLD AUTO: 0.01 K/UL (ref 0–0.2)
BASOPHILS NFR BLD: 0.2 % (ref 0–1.9)
BUN SERPL-MCNC: 10 MG/DL (ref 8–23)
C DIFF GDH STL QL: NEGATIVE
C DIFF TOX A+B STL QL IA: NEGATIVE
CALCIUM SERPL-MCNC: 8.4 MG/DL (ref 8.7–10.5)
CHLORIDE SERPL-SCNC: 115 MMOL/L (ref 95–110)
CO2 SERPL-SCNC: 24 MMOL/L (ref 23–29)
CREAT SERPL-MCNC: 0.7 MG/DL (ref 0.5–1.4)
DIFFERENTIAL METHOD BLD: ABNORMAL
EOSINOPHIL # BLD AUTO: 0.1 K/UL (ref 0–0.5)
EOSINOPHIL NFR BLD: 3.1 % (ref 0–8)
ERYTHROCYTE [DISTWIDTH] IN BLOOD BY AUTOMATED COUNT: 13.4 % (ref 11.5–14.5)
EST. GFR  (NO RACE VARIABLE): >60 ML/MIN/1.73 M^2
GLUCOSE SERPL-MCNC: 83 MG/DL (ref 70–110)
HCT VFR BLD AUTO: 37.9 % (ref 37–48.5)
HGB BLD-MCNC: 12.4 G/DL (ref 12–16)
IMM GRANULOCYTES # BLD AUTO: 0.02 K/UL (ref 0–0.04)
IMM GRANULOCYTES NFR BLD AUTO: 0.4 % (ref 0–0.5)
LYMPHOCYTES # BLD AUTO: 2.4 K/UL (ref 1–4.8)
LYMPHOCYTES NFR BLD: 53.5 % (ref 18–48)
MAGNESIUM SERPL-MCNC: 1.9 MG/DL (ref 1.6–2.6)
MCH RBC QN AUTO: 29.8 PG (ref 27–31)
MCHC RBC AUTO-ENTMCNC: 32.7 G/DL (ref 32–36)
MCV RBC AUTO: 91 FL (ref 82–98)
MONOCYTES # BLD AUTO: 0.6 K/UL (ref 0.3–1)
MONOCYTES NFR BLD: 12.1 % (ref 4–15)
NEUTROPHILS # BLD AUTO: 1.4 K/UL (ref 1.8–7.7)
NEUTROPHILS NFR BLD: 30.7 % (ref 38–73)
NRBC BLD-RTO: 0 /100 WBC
PLATELET # BLD AUTO: 165 K/UL (ref 150–450)
PLATELET BLD QL SMEAR: ABNORMAL
PMV BLD AUTO: 10.5 FL (ref 9.2–12.9)
POTASSIUM SERPL-SCNC: 4.2 MMOL/L (ref 3.5–5.1)
RBC # BLD AUTO: 4.16 M/UL (ref 4–5.4)
SODIUM SERPL-SCNC: 143 MMOL/L (ref 136–145)
WBC # BLD AUTO: 4.54 K/UL (ref 3.9–12.7)
WBC #/AREA STL HPF: NORMAL /[HPF]

## 2024-02-04 PROCEDURE — 63600175 PHARM REV CODE 636 W HCPCS: Mod: HCNC | Performed by: PHYSICIAN ASSISTANT

## 2024-02-04 PROCEDURE — 83735 ASSAY OF MAGNESIUM: CPT | Mod: HCNC | Performed by: PHYSICIAN ASSISTANT

## 2024-02-04 PROCEDURE — 96372 THER/PROPH/DIAG INJ SC/IM: CPT | Performed by: PHYSICIAN ASSISTANT

## 2024-02-04 PROCEDURE — G0378 HOSPITAL OBSERVATION PER HR: HCPCS | Mod: HCNC

## 2024-02-04 PROCEDURE — 36415 COLL VENOUS BLD VENIPUNCTURE: CPT | Mod: HCNC | Performed by: PHYSICIAN ASSISTANT

## 2024-02-04 PROCEDURE — 25000003 PHARM REV CODE 250: Mod: HCNC | Performed by: PHYSICIAN ASSISTANT

## 2024-02-04 PROCEDURE — 85025 COMPLETE CBC W/AUTO DIFF WBC: CPT | Mod: HCNC | Performed by: PHYSICIAN ASSISTANT

## 2024-02-04 PROCEDURE — A4216 STERILE WATER/SALINE, 10 ML: HCPCS | Mod: HCNC | Performed by: PHYSICIAN ASSISTANT

## 2024-02-04 PROCEDURE — 94761 N-INVAS EAR/PLS OXIMETRY MLT: CPT | Mod: HCNC

## 2024-02-04 PROCEDURE — 25000003 PHARM REV CODE 250: Mod: HCNC | Performed by: HOSPITALIST

## 2024-02-04 PROCEDURE — 80048 BASIC METABOLIC PNL TOTAL CA: CPT | Mod: HCNC | Performed by: PHYSICIAN ASSISTANT

## 2024-02-04 PROCEDURE — 96361 HYDRATE IV INFUSION ADD-ON: CPT

## 2024-02-04 RX ORDER — LOPERAMIDE HYDROCHLORIDE 2 MG/1
2 CAPSULE ORAL 4 TIMES DAILY PRN
Qty: 30 CAPSULE | Refills: 1
Start: 2024-02-04 | End: 2024-02-14

## 2024-02-04 RX ORDER — MIRTAZAPINE 15 MG/1
15 TABLET, FILM COATED ORAL NIGHTLY
Qty: 30 TABLET | Refills: 0 | Status: SHIPPED | OUTPATIENT
Start: 2024-02-04 | End: 2025-02-03

## 2024-02-04 RX ORDER — L. ACIDOPHILUS/L.BULGARICUS 100MM CELL
1 GRANULES IN PACKET (EA) ORAL 2 TIMES DAILY
Qty: 28 PACKET | Refills: 0 | Status: SHIPPED | OUTPATIENT
Start: 2024-02-04 | End: 2024-02-18

## 2024-02-04 RX ADMIN — SODIUM CHLORIDE: 9 INJECTION, SOLUTION INTRAVENOUS at 12:02

## 2024-02-04 RX ADMIN — Medication 10 ML: at 06:02

## 2024-02-04 RX ADMIN — HEPARIN SODIUM 5000 UNITS: 5000 INJECTION INTRAVENOUS; SUBCUTANEOUS at 05:02

## 2024-02-04 RX ADMIN — LACTOBACILLUS ACIDOPHILUS / LACTOBACILLUS BULGARICUS 1 EACH: 100 MILLION CFU STRENGTH GRANULES at 09:02

## 2024-02-04 NOTE — DISCHARGE SUMMARY
[facility] [address] Medicine  Discharge Summary      Patient Name: Alicia Toussaint  MRN: [id number]  NIR: [id number]  Patient Class: OP- Observation  Admission Date: 2/2/2024  Hospital Length of Stay: 0 days  Discharge Date and Time: 2/4/2024  2:20 PM  Attending Physician: No att. providers found   Discharging Provider: Tai Sanchez MD  Primary Care Provider: Ranjit Sweeney MD    Primary Care Team: Networked reference to record PCT     HPI:   Ms. Alicia Toussaint is a 73 y.o. female, with PMH of prediabetes, who presented to [facility] ED on 2/2/24 due to diarrhea and abdominal pain. She states the pain began on Tuesday, was associated with watery diarrhea, and she attributed it to something she ate. She took Pepto-Bismol and the symptoms resolved. She was able to eat on Wednesday, but has had reduced appetite. On Thursday she ate some chicken, and the pain and diarrhea returned. She has tried taking imodium, but symptoms were only relieved temporarily. She denies sick contacts, recent travel, consumption of raw/undercooked foods. Yesterday she had an appointment with another doctor in her PCP's office, where she reported she had 6 watery bowel movements with the final one occurring at 1330 yesterday, and being stopped by imodium. She reported associated abdominal/gas pain, and mild nausea without vomiting. She states she has been tolerating water, and has had a subjective fever. She denied headache, chills, night sweats, lightheadedness, dizziness. She was evaluated in the ED with labs showing no leukocytosis or left shift.  A metabolic panel showed no significant abnormalities. A procalcitonin was mildly elevated at 0.25. UA was without UTI. A CT of the abdomen and pelvis showed findings concerning for enteritis of the small bowel, and mild intrahepatic biliary ductal dilatation. She was placed on observation.     Goals of Care Treatment Preferences:  Code Status: Full Code      Consults:    Consults (From admission, onward)          Status Ordering Provider     Inpatient consult to Social Work  Once        Provider:  (Not yet assigned)    Completed KJ WEST     Inpatient consult to Telemedicine - Psyc  Once        Provider:  (Not yet assigned)    Completed KJ WEST     Inpatient consult to Registered Dietitian/Nutritionist  Once        Provider:  (Not yet assigned)    Completed KJ WEST     Inpatient consult to Registered Dietitian/Nutritionist  Once        Provider:  (Not yet assigned)    Completed KJ WEST     Inpatient consult to Gastroenterology  Once        Provider:  (Not yet assigned)    Acknowledged JOANA STEPHENS          Hospital Course By Problem:   * Enteritis  -Placed in observation  -Presented with abdominal pain, gas, and profuse diarrhea   -CT abdomen/pelvis showed enteritis and intrahepatic biliary dilatation   -No leukocytosis.  Procal barely elevated.  C.diff negative.  Fecal WBC negative  -Suspect viral etiology as most likely cause.  Appreciate input from GI.  -Stopped zosyn 2/3 and added probiotic and imodium.  Only 1 bowel movement during her stay and tolerating her diet.  OK for discharge home today with outpatient follow up with PCP and GI.     Unintentional weight loss  -Patient with marked unintentional weight loss over last year  -Body mass index is 18.12 kg/m².  -Reports had C-scope in last several years which was normal but for 1 polyp  -Reports has regular mammograms and pap smears -   -Notes significant anxiety and frequent worry.  -Consulted nutrition and psychiatry and input appreciated  -Started mirtazapine and recommend nutritional shakes.     Dilation of biliary tract  -Noted on CT abdomen/pelvis  -Tbili, AST and ALT all normal  -Agree with GI recs for outpatient eval with MRCP     Protein-calorie malnutrition  -Treatment as above.      Final Active Diagnoses:    Diagnosis Date Noted POA    PRINCIPAL PROBLEM:  Enteritis [K52.9]  02/03/2024 Yes    Unintentional weight loss [R63.4] 02/03/2024 Yes    Severe malnutrition [E43] 02/03/2024 Yes    Dilation of biliary tract [K83.8] 02/03/2024 Yes    AMANDA (generalized anxiety disorder) [F41.1] 02/03/2024 Yes     Chronic      Problems Resolved During this Admission:       Discharged Condition: stable    Disposition: Home or Self Care    Follow Up:   Follow-up Information       Ranjit Sweeney MD. Schedule an appointment as soon as possible for a visit in 1 week(s).    Specialty: Family Medicine  Why: for hospital follow up  Contact information:  [address]  [facility] [address]  [facility] [address]  [phone]                           Patient Instructions:      Ambulatory referral/consult to Gastroenterology   Standing Status: Future   Referral Priority: Routine Referral Type: Consultation   Referral Reason: Specialty Services Required   Requested Specialty: Gastroenterology   Number of Visits Requested: 1     Diet Adult Regular     Notify your health care provider if you experience any of the following:  increased confusion or weakness     Notify your health care provider if you experience any of the following:  persistent dizziness, light-headedness, or visual disturbances     Notify your health care provider if you experience any of the following:  worsening rash     Notify your health care provider if you experience any of the following:  severe persistent headache     Notify your health care provider if you experience any of the following:  difficulty breathing or increased cough     Notify your health care provider if you experience any of the following:  severe uncontrolled pain     Notify your health care provider if you experience any of the following:  persistent nausea and vomiting or diarrhea     Notify your health care provider if you experience any of the following:  temperature >100.4     Activity as tolerated       Significant Diagnostic Studies: Labs: BMP:   Recent Labs   Lab  "02/03/24 0057 02/03/24  0540 02/04/24 0446   GLU 89 89 83    143 143   K 4.6 3.7 4.2    113* 115*   CO2 26 21* 24   BUN 23 17 10   CREATININE 0.8 0.7 0.7   CALCIUM 9.4 8.2* 8.4*   MG  --  2.0 1.9   , CMP   Recent Labs   Lab 02/03/24 0057 02/03/24 0540 02/04/24 0446    143 143   K 4.6 3.7 4.2    113* 115*   CO2 26 21* 24   GLU 89 89 83   BUN 23 17 10   CREATININE 0.8 0.7 0.7   CALCIUM 9.4 8.2* 8.4*   PROT 7.1  --   --    ALBUMIN 3.7  --   --    BILITOT 0.5  --   --    ALKPHOS 53*  --   --    AST 36  --   --    ALT 27  --   --    ANIONGAP 11 9 4*   , CBC   Recent Labs   Lab 02/03/24 0057 02/03/24 0540 02/04/24 0446   WBC 5.91 4.57 4.54   HGB 14.7 13.4 12.4   HCT 43.9 39.7 37.9    184 165   , INR No results found for: "INR", "PROTIME", Lipid Panel   Lab Results   Component Value Date    CHOL 191 05/02/2023    HDL 61 05/02/2023    LDLCALC 117.0 05/02/2023    TRIG 65 05/02/2023    CHOLHDL 31.9 05/02/2023   , Troponin No results for input(s): "TROPONINI" in the last 168 hours., and A1C: No results for input(s): "HGBA1C" in the last 4320 hours.    Pending Diagnostic Studies:       Procedure Component Value Units Date/Time    Calprotectin, Stool [[id number]] Collected: 02/03/24 1811    Order Status: Sent Lab Status: In process Updated: 02/03/24 2210    Specimen: Stool     Giardia / Cryptosporidum, EIA [[id number]] Collected: 02/03/24 1811    Order Status: Sent Lab Status: In process Updated: 02/03/24 2210    Specimen: Stool     Rotavirus antigen, stool [[id number]] Collected: 02/03/24 1811    Order Status: Sent Lab Status: In process Updated: 02/03/24 2210    Specimen: Stool     Stool Exam-Ova,Cysts,Parasites [[id number]] Collected: 02/03/24 1811    Order Status: Sent Lab Status: In process Updated: 02/03/24 2210    Specimen: Stool            Medications:  Reconciled Home Medications:      Medication List        START taking these medications      lactobacillus acidophilus & bulgar 100 " million cell packet  Commonly known as: LACTINEX  Take 1 packet (1 each total) by mouth 2 (two) times daily. for 14 days     loperamide 2 mg capsule  Commonly known as: IMODIUM  Take 1 capsule (2 mg total) by mouth 4 (four) times daily as needed for Diarrhea.     mirtazapine 15 MG tablet  Commonly known as: REMERON  Take 1 tablet (15 mg total) by mouth every evening.            CONTINUE taking these medications      ascorbic acid (vitamin C) 100 MG tablet  Commonly known as: VITAMIN C  Take 100 mg by mouth once daily.     calcium carbonate 1250 MG capsule  Take 1,250 mg by mouth 2 (two) times daily with meals.     fish oil-omega-3 fatty acids 300-1,000 mg capsule  Take 2 g by mouth once daily.     multivitamin per tablet  Commonly known as: THERAGRAN  Take 1 tablet by mouth once daily.            STOP taking these medications      diclofenac sodium 1 % Gel  Commonly known as: VOLTAREN     VITAMIN B-12 ORAL     VITAMIN D ORAL              Indwelling Lines/Drains at time of discharge:   Lines/Drains/Airways       None                   Time spent on the discharge of patient: 35 minutes         Tai Sanchez MD  Department of Hospital Medicine  [facility] - Med Surg ([address])

## 2024-02-04 NOTE — PLAN OF CARE
02/04/24 0951   HERNANDEZ Message   Medicare Outpatient and Observation Notification regarding financial responsibility Given to patient/caregiver;Explained to patient/caregiver;Signed/date by patient/caregiver   Date HERNANDEZ was signed 02/04/24   Time HERNANDEZ was signed 0900

## 2024-02-04 NOTE — DISCHARGE INSTRUCTIONS
Thank you for allowing me to participate as part of your health care team, and thank you for choosing [facility].    GEOVANNA HUBBARD MD  Board Certified in Psychiatry & Addiction Medicine      IN CASE OF SUICIDAL THINKING, call the National Suicide Hotline Number: 988    988 Suicide & Crisis Lifeline: 988 , 7-349-523-TALK (8255)  https://[employer].Tervela           AFTER VISIT INSTRUCTIONS:     [x] Take all medication, from all providers, as prescribed.  [x] If questions or concerns arise, or if experiencing side effects, adverse reactions or worsening symptoms, contact your provider through the [facility] portal at [email or web address], or call [phone] to reach the [facility] main line.  [x] In cases of emergencies, call [phone], or present directly to the emergency department for immediate assistance.      INFORMATION ON MENTAL HEALTH MEDICATIONS:     National [place] of Mental Health:   https://www.nimh.nih.gov/health/topics/mental-health-medications     Web MD:   https://www.[employer].Voz.io       RESOURCES:     IN CASE OF SUICIDAL THINKING, call the LiveIntent Suicide Hotline Number: 988    988 Suicide & Crisis Lifeline: 988 , 8-028-960-TALK (8255)  Provides 24/7, free and confidential support for people in distress, prevention and crisis resources for you or your loved ones, and best practices for professionals.    Call, text or chat.  https://H[employer].org     National Action [place] for Suicide Prevention: the National Action [place] for Suicide Prevention (Action Carroll) is the nations public-private partnership for suicide prevention, working with more than 250 national partners.   https://theactionalliance.org     National Strategy for Suicide Prevention & Risk Mitigation:  https://theactionalliance.org/our-strategy/national-strategy-suicide-prevention     [x] Fact Sheet:   https://www.hhs.gov/sites/default/files/national-strategy-for-suicide-prevention-factsheet.pdf     [x] Report:    https://www.ncbi.nlm.nih.gov/books/PHP456678/pdf/Bookshelf_NBK109917.pdf     Suicide Prevention Resource Center: The Suicide Prevention Resource Center (SPR) is the only federally supported resource center devoted to advancing the implementation of the National Strategy for Suicide Prevention. [facility] is funded by the U.S. Department of Health and Human Services' Substance Abuse and Mental Health Services Administration (SAMA).  https://www.[facility].org     [x] Safety Plan:   https://Terranova/wp-content/uploads/2021/08/Chet-Safety-Plan-8-6-21.pdf     [x] Suicide Risk Curve:  https://Terranova/wp-content/uploads/2021/08/Wydgnac-yhby-aaimb-8-6-21.pdf     [place] Mental Health Advocacy Service: the state agency tasked with protecting the legal rights of people with behavioral health diagnoses.  https://mhas.[place].[facility]     Alcoholics Anonymous (AA): find a meeting near you.  https://www.aa.org     SMI Adviser: resources for individuals and families with serious mental illness.  https://smiadviser.org     National [place] for the Mentally Ill (DESTINEY): the nation's largest grassroots organization dedicated to building better lives for individuals with mental illness.  https://www.destiney.org/Home     U.S. Department of Health and Human Services (HHS): the mission of HHS is to enhance the health and well-being of all Americans, by providing for effective health and human services and by fostering sound, sustained advances in the sciences underlying medicine, public health, and .   https://www.hhs.gov     Substance Abuse and Mental Health Services Administration (SAMHSA): SAMHSA is the agency within [facility] that leads public health efforts to advance the behavioral health of the nation. SAMHSA's mission is to reduce the impact of substance abuse and mental illness on [place]'s communities.   https://www.samhsa.gov     National Institutes of Health (NIH): a part of [facility], [facility] is  the largest biomedical research agency in the world.   https://www.nih.gov     National [place] on Drug Abuse (MISTI): sponsored by the NIH, the mission of MISTI is to advance science on drug use and addiction and to apply that knowledge to improve individual and public health.  https://misti.nih.gov     National [place] on Alcohol Abuse and Alcoholism (NIAAA): sponsored by the NIH, the mission of NIAA is to generate and disseminate fundamental knowledge about the effects of alcohol on health and well-being, and apply that knowledge to improve diagnosis, prevention, and treatment of alcohol-related problems, including alcohol use disorder, across the lifespan.   https://www.niaaa.nih.gov     National Harm Reduction Coalition: resources for harm reduction, including techniques, strategies, policy, and advocacy.  https://harmreduction.org     The SHARE Approach - A Model for Shared Decision Making:  [x] Fact Sheet  https://www.[facility].gov/sites/default/files/publications/files/share-approach_factsheet.pdf     AMA Principles of Medical Ethics - Informed Consent & Shared Decision Making:  [x] Chapter  https://www.ama-assn.org/system/files/2019-06/code-of-medical-bvlczq-fgyejyu-5.pdf     Safety Netting for Primary Care:  [x] Article  https://www.ncbi.nlm.nih.gov/pmc/articles/HDT3499423/pdf/xbfyyfm-4644--e70.pdf       MEDICATION MANAGEMENT:     [x] In addition to the potential beneficial effects, the use of any medication or drug (prescribed, over the counter or otherwise) carries with it the risk of potential adverse effects.  Each has a set of typical adverse effects - some common, some rare - but idiosyncratic and unanticipated reactions unique to you are always possible.      [x] It is important to remember that untreated illness can also pose a risk, which must be taken into account when weighing the pros and cons of a medication trial.    [x] Medications and drugs can sometimes interact with each other in the  body, leading to adverse effects - it is important that all your providers know all the medications and drugs you take - prescribed, over the counter, or otherwise.  Keep all your practitioners up to date with any changes.  It's always a good idea to keep an up-to-date list in an easily accessible location.    [x] There is an inherent unpredictability to all treatment, including the use of medication.  Unexpected outcomes can occur - keep me up to date with any difficulties you encounter.    [x] It is important to take medication as directed, and to comply fully with the instructions.  Check with the appropriate provider first before adjusting or stopping your medication on your own.    If you require further information pertaining to the issues outlined above, please reach out to your providers through the [facility] portal at [email or web address], or call [phone] to discuss.  See resource list for additional material.     Additional information can be provided pertaining to your diagnosis, intended outcomes, target symptoms for treatment, and possible benefits and risks of medication - you can also access this information through the provided resources.  Possible alternatives to the current treatment plan (including no treatment) can also be reviewed.      GENERAL HEALTH & WELLNESS:     [x] Establish and follow regularly with a primary care physician for routine health maintenance and management of any medical comorbidities.  [x] Follow a healthy diet, exercise routinely, and monitor weight and metabolic parameters.  [x] Allow adequate time for sleep and practice good sleep hygiene.  [x] Do not operate a motor vehicle or heavy machinery if the effects of medications or the symptoms underlying your condition impair the ability for you to do so safely.    Dietary Guidelines for Americans, 2101-7615:  U.S. Department of Agriculture  (USDA)  https://www.dietaryguidelines.gov/sites/default/files/2020-12/Dietary_Guidelines_for_Americans_2020-2025.pdf#page=31     The Nutrition Source:  [facility]  https://www.[facility].[place].[facility]/nutritionsource       SLEEP HYGIENE:     Follow these tips to establish healthy sleep habits:  [x] Keep a consistent sleep schedule. Get up at the same time every day, even on weekends or during vacations.  [x] Set a bedtime that is early enough for you to get at least 7-8 hours of sleep.  [x] Don't go to bed unless you are sleepy.  [x] If you don't fall asleep after 20 minutes, get out of bed. Go do a quiet activity without a lot of light exposure. It is especially important to not get on electronics.  [x] Establish a relaxing bedtime routine.  [x] Use your bed only for sleep and sex.  [x] Make your bedroom quiet and relaxing. Keep the room at a comfortable, cool temperature.  [x] Limit exposure to bright light in the evenings.  [x] Turn off electronic devices at least 30 minutes before bedtime.  [x] Don't eat a large meal before bedtime. If you are hungry at night, eat a light, healthy snack.  [x] Exercise regularly and maintain a healthy diet.  [x] Avoid consuming caffeine in the afternoon or evening.  [x] Avoid consuming alcohol before bedtime.  [x] Reduce your fluid intake before bedtime.    QUICK TIPS FOR BETTER SLEEP  Reduce smartphone usage Create and maintain a nightly ritual Avoid caffeine 4-6 hours before sleeping Don't eat or drink too much at bedtime Sleep at the same time every night        American Academy of Sleep Medicine - Healthy Sleep Habits:  https://sleepeducation.org/healthy-sleep/healthy-sleep-habits     American Academy of Sleep Medicine - Bedtime Calculator:  https://sleepeducation.org/healthy-sleep/bedtime-calculator     American Academy of Sleep Medicine - Cognitive Behavioral Therapy for Insomnia (CBT-I):  https://sleepeducation.org/patients/cognitive-behavioral-therapy      American Academy of Sleep Medicine - Insomnia:  https://sleepeducation.org/sleep-disorders/insomnia       ALCOHOL & DRUG USE COUNSELING:     Preventing Excessive Alcohol Use (CDC):  https://www.cdc.gov/alcohol/fact-sheets/moderate-drinking.htm#:~:text=To%20reduce%20the%20risk%20of,days%20when%20alcohol%20is%20consumed.     [x] Alcohol consumption is associated with a variety of short- and long-term health risks, including motor vehicle crashes, violence, sexual risk behaviors, high blood pressure, and various cancers (e.g., breast cancer).  [x] The risk of these harms increases with the amount of alcohol you drink. For some conditions, like some cancers, the risk increases even at very low levels of alcohol consumption (less than 1 drink).  [x] To reduce the risk of alcohol-related harms, the 9196-4386 Dietary Guidelines for Americans recommends that adults of legal drinking age can choose not to drink, or to drink in moderation by limiting intake to 2 drinks or less in a day for men or 1 drink or less in a day for women, on days when alcohol is consumed.  [x] The Guidelines also do not recommend that individuals who do not drink alcohol start drinking for any reason and that if adults of legal drinking age choose to drink alcoholic beverages, drinking less is better for health than drinking more.  [x] The Guidelines note that some people should not drink alcohol at all, such as:  - If they are pregnant or might be pregnant.  - If they are younger than age 21.  - If they have certain medical conditions or are taking certain medications that can interact with alcohol.  - If they are recovering from an alcohol use disorder or if they are unable to control the amount they drink.  [x] The Guidelines also note that not drinking alcohol is the safest option for women who are lactating. Generally, moderate consumption of alcoholic beverages by a woman who is lactating (up to 1 standard drink in a day) is not known to  "be harmful to the infant, especially if the woman waits at least 2 hours after a single drink before nursing or expressing breast milk. Women considering consuming alcohol during lactation should talk to their healthcare provider.  [x] The Guidelines note, Emerging evidence suggests that even drinking within the recommended limits may increase the overall risk of death from various causes, such as from several types of cancer and some forms of cardiovascular disease. Alcohol has been found to increase risk for cancer, and for some types of cancer, the risk increases even at low levels of alcohol consumption (less than 1 drink in a day).  [x] Although past studies have indicated that moderate alcohol consumption has protective health benefits (e.g., reducing risk of heart disease), recent studies show this may not be true.  [x] Its important to focus on the amount people drink on the days that they drink. Even if women consume an average of 1 drink per day or men consume an average of 2 drinks per day, binge drinking increases the risk of experiencing alcohol-related harm in the short-term and in the future.    Drinking Levels Defined (NIAAA):  https://www.niaaa.nih.gov/alcohol-health/overview-alcohol-consumption/moderate-binge-drinking     Drinking in Moderation:  According to the "Dietary Guidelines for Americans 6952-8243, U.S. Department of Health and Human Services and U.S. Department of Agriculture, adults of legal drinking age can choose not to drink or to drink in moderation by limiting intake to 2 drinks or less in a day for men and 1 drink or less in a day for women, when alcohol is consumed. Drinking less is better for health than drinking more.    Binge Drinking:  NIAAA defines binge drinking as a pattern of drinking alcohol that brings blood alcohol concentration (JOHAN) to 0.08 percent - or 0.08 grams of alcohol per deciliter - or higher.  For a typical adult, this pattern corresponds to consuming 5 " or more drinks (male), or 4 or more drinks (female), in about 2 hours.    The Substance Abuse and Mental Health Services Administration (SAMHSA), which conducts the annual National Survey on Drug Use and Health (NSDUH), defines binge drinking as 5 or more alcoholic drinks for males or 4 or more alcoholic drinks for females on the same occasion (i.e., at the same time or within a couple of hours of each other) on at least 1 day in the past month.    Heavy Alcohol Use:  NIAAA defines heavy drinking as follows:  - For men, consuming more than 4 drinks on any day or more than 14 drinks per week.  - For women, consuming more than 3 drinks on any day or more than 7 drinks per week.     [facility] defines heavy alcohol use as binge drinking on 5 or more days in the past month.    Patterns of Drinking Associated with Alcohol Use Disorder:  Binge drinking and heavy alcohol use can increase an individual's risk of alcohol use disorder.    Certain people should avoid alcohol completely, including those who:  - Plan to drive or operate machinery, or participate in activities that require skill, coordination, and alertness.  - Take certain over-the-counter or prescription medications.  - Have certain medical conditions.  - Are recovering from alcohol use disorder or are unable to control the amount that they drink.  - Are younger than age 21.  - Are pregnant or may become pregnant.    U.S. Standard Drink  12 oz beer   (5% ABV) 8 oz malt liquor   (7% ABV) 5 oz wine   (12% ABV) 1.5 oz 80-proof distilled spirit  (40% ABV)        Heroin use harm reduction:  1. Carry naloxone. When using heroin, make sure you have at least one dose of naloxone - the overdose reversal drug - and have it in plain view. Understand how to give it.  2. Try a small dose first. It is best to first try a small amount of the heroin to check the effect.  3. Dont use heroin alone. Always use heroin with someone else and take turns while using.    It is possible to  overdose with heroin whether you are snorting, injecting or using it in another form.    Signs of an overdose or emergency:   - The person is awake but unable to talk.  - Their body is limp.  - Their breathing is shallow or slow or stopped.  - Their skin is pale, ashen or clammy/sweaty.  - They are unconscious.    In case of emergency, give naloxone. If you suspect the heroin may contain fentanyl, administer more than one dose. Seek medical help even if naloxone has been given. Call 911 for help.      ADHD TREATMENT AND STIMULANT MEDICATIONS:     [facility] Prescription Stimulants Drug Facts  CMS Stimulant and Related Medications: Use in Adults  PANCHO Drug Fact Sheets: Stimulants  FDA Drug Safety Communication: Stimulants  [facility] ADHD  WebMD ADHD Medications and Side Effects  [facility]: ADHD Medication      SHARED DECISION MAKING & INFORMED CONSENT:     Shared medical decision making and informed consent are the hallmark and bedrock of excellent clinical care.  During the encounter, shared medical decision making was employed and informed consent was obtained, to the degree possible, whenever feasible, appropriate and relevant. Those interventions are supplemented here with written materials, detailing the topics in more depth.       PSYCHOEDUCATION:     Psychoeducation pertaining to the following -     Diagnosis Etiology Disease Processes Natural Progression   Treatment Options Time Course Safety Netting Informed Consent   Intended Benefits of Medication Expectable Adverse Effects Target Symptoms for Treatment Alternatives to Current Treatment   Shared   Decision Making Risk Mitigation Strategies Harm Reduction Techniques Associated Bio-Med Complications     - can be further discussed and reviewed (you can also access additional information through the provided resources in this document).      Effective communication is essential in order to engage in shared medical decision making.  If you had difficulty understanding  anything during your encounter or in this supplementary document, please contact your providers through the [facility] portal at [email or web address] or call [phone].     Amber Dictionary  https://dictionary.amber.org/us       It can be easy to miss, forget, or misremember important important information that was discussed during the session - especially when you're stressed, upset, or don't feel well.  If you or a representative have any additional questions, concerns, or topics to discuss - please contact your providers through the [facility] portal at [email or web address] or call [phone].    Memory Loss  https://www."[employer]app[employer]/brain/memory-loss    Causes of Memory Loss  https://www.Digital Trowel/what-causes-memory-loss-9671104    Memory loss: When to seek help  https://www.[facility].org/diseases-conditions/alzheimers-disease/in-depth/memory-loss/art-91723498    Memory, Forgetfulness, and Aging: What's Normal and What's Not?  https://www.moreno.nih.gov/health/memory-forgetfulness-and-aging-whats-normal-and-whats-not    Depression and Memory Loss  https://www.tuta.co/health/depression/depression-and-memory-loss    The Relationship Between Anxiety and Memory Loss  https://www.[facility]/academics/blog-posts/the-relationship-between-anxiety-and-memory-loss     PRESCRIPTION DRUG MANAGEMENT:     Prescription Drug Management entails the following:  [x] The review, recommendation, or consideration without recommendation of medications during the encounter.  [x] Discussion (to the extent possible) with the patient and/or other interested parties of the diagnosis, target symptoms, intended outcomes, and possible benefits and risks of medication, as well as alternatives (including no treatment), if not otherwise known or stated prior.  [x] Discussion (to the extent possible) with the patient and/or other interested parties of possible expectable adverse effects of any proposed individual  psychotropic agents, as well as the inherent unpredictability of treatment, if not otherwise known or stated prior.  [x] Informed consent is sought from the patient (and/or guardian/designated decision maker, if applicable) after a thorough discussion (to the extent possible) of the aforementioned points outlined above.  [x] The provision of counseling (to the extent possible) to the patient and/or other interested parties on the importance of full compliance with any prescribed medication, if not otherwise known or stated prior.    Information on psychotropic medication can be found at:   National [place] of Mental Health: Information on Mental Health Medications      RISK MITIGATION, HARM REDUCTION & SAFETY NETTING:     Risk Mitigation Strategies, Harm Reduction Techniques, and Safety Netting are important interventions that can reduce acute and chronic risk.  As such, opportunities were sought to incorporate psychoeducation and practical advice pertaining to these topics into the encounter, to the degree possible, whenever feasible, appropriate and relevant.  Those interventions are supplemented here with written materials, detailing the topics in more depth.       RISK MITIGATION STRATEGIES:     Risk mitigation strategies are used to reduce the likelihood of future episodes of suicide, homicide, violence, and/or other problematic behaviors (e.g. self-injurious, risky, addictive, compulsive, impulsive). The following are examples of risk mitigation strategies which you can employ in order to reduce your overall burden of risk.     [x] Treatment of underlying psychopathology driving acute and chronic risk to the extent possible.  [x] Use of self administered rating scales and journaling to assist in risk tracking.  [x] Exploration of protective factors to potentially counterbalance risk.  [x] Identification and avoidance of triggers and situations that increase risk, including excessive alcohol and drug  use.  [x] Timely follow up and ongoing treatment of mental health issues moving forward.  [x] Full compliance with medication regimen.  [x] A good working knowledge of your medication regimen, including specific instructions on the administration of the medications.  [x] Consultation with an appropriate medical provider prior to altering or deviating from these instructions on your own.  [x] Active involvement and participation of family and natural support wherever feasible and possible.  [x] Development and review of coping strategies that can be immediately deployed in times of acute crisis.  [x] Implementation of home safety practices and the removal/reduction of access to lethal means (including, but not limited to, firearms, certain types and quantities of medication, poisons, or other methods you may have contemplated or identified).  [x] Collaborative development of a written safety plan with your treatment team and loved ones that can be immediately referred to in times of acute crisis.  [x] Utilization of a safety contract to engage your treatment team and further assess/manage risk.  [x] A good working knowledge of how to access emergency treatment in times of acute crisis.  [x] Utilization of suicide hotlines number (988) and resources in times of crisis.    If you require further information pertaining to the issues outlined above, please reach out to your providers through the [facility] portal at [email or web address], or call [phone] to discuss.  See resource list for additional material.      SAFETY NETTING:     In healthcare, safety netting refers to the provision of information to help patients or carers identify the need to consult a health care professional if a health concern arises or changes.  The relevance of this advice is most obvious with chronic mental illnesses, as their dynamic nature, with symptoms and signs emerging at different times and in different combinations, makes safety  netting particularly important.  Specific safety net advice for you includes the following:    [x] The existence of uncertainty. Mental health diagnoses and conditions contain at least some degree of uncertainty - knowing this, you should feel empowered to reconsult if necessary.  [x] What exactly to look out for. Given the recognised risk of possible deterioration or the development of complications, you should become familiar with the specific clinical features (including red flags) to look out for.    [x] How exactly to seek further help. You should know how and where to seek further help if needed.  Make a plan in advance and keep it handy.  It's also a good idea to share the plan with your treatment providers and loved ones.  [x] What to expect about time course. Mental health diagnoses and conditions often have an expected time course, which is important information for you to know.  However, if your difficulties do not conform to this time line and concerns arise, do not delay seeking further medical advice.    If you require further information pertaining to the issues outlined above, please reach out to your providers through the [facility] portal at [email or web address], or call [phone] to discuss.  See resource list for additional material.      HARM REDUCTION:     Harm Reduction techniques are used in an effort to reduce negative consequences associated with risky and maladaptive behaviors, until cessation of the problematic behaviors can be established.  Harm reduction is best thought of as a journey and not a destination; it is not an endorsement of problematic behavior, but an acknowledgement and recognition of the step-by-step nature of recovery.      Although commonly employed in working with people who suffer with drug addiction, harm reduction can be more broadly applied to any problematic behavior.    Harm Reduction and Substance Abuse:  [x] Incorporates a spectrum of strategies that  includes safer use, managed use, abstinence, meeting people who use drugs where theyre at, and addressing conditions of use along with the use itself.  [x] Accepts, for better or worse, that licit and illicit drug use is part of our world and chooses to work to minimize its harmful effects rather than simply ignore or condemn them.  [x] Understands drug use as a complex, multi-faceted phenomenon that encompasses a continuum of behaviors from severe use to total abstinence, and acknowledges that some ways of using drugs are clearly safer than others.  [x] Calls for the non-judgmental, non-coercive provision of services and resources to people who use drugs and the communities in which they live in order to assist them in reducing attendant harm.  [x] Affirms people who use drugs themselves as the primary agents of reducing the harms of their drug use and seeks to empower them to share information and support each other in strategies which meet their actual conditions of use.  [x] Does not attempt to minimize or ignore the real and tragic harm and danger that can be associated with illicit drug use.  [x] Meets people where they are, but seeks to not leave them there.  [x] Examples of specific interventions include, but are not limited to, narcan (naloxone), medication assisted treatment, syringe access, overdose prevention, and safer drug use techniques.    Key Harm Reduction Strategies: Opioid Use Disorder  [x] Safe Injection Sites & Equipment  [x] Managed Use  [x] Syringe Exchange Programs  [x] Fentanyl Test Strips  [x] Pharmacotherapy/Medication Assisted Treatment  [x] Narcan  [x] [employer]  [x] Treatment Instead of long term  [x] Diversion Programs  [x] Overdose Education  [x] Abstinence    Whether or not you struggle with substance abuse, any and all opportunities to employ harm reduction techniques to address difficult to change problematic behaviors should be sought and implemented - whenever and  "wherever feasible, relevant and applicable. Additionally, harm reduction techniques can be applied broadly, and are relevant for a multitude of situations - even those that do not involve problematic or maladaptive behaviors.     EXAMPLES OF HARM REDUCTION IN OTHER AREAS  SUN SCREEN SEAT BELTS SPEED LIMITS BIRTH CONTROL        If you require further information pertaining to the issues outlined above, please reach out to your providers through the [facility] portal at [email or web address], or call [phone] to discuss.  See resource list for additional material.      FIREARM SAFETY:     THE SIX BASIC GUN SAFETY RULES  There are six basic gun safety rules for gun owners to understand and practice at all times:  Treat all guns as if they are loaded. Always assume that a gun is loaded even if you think it is unloaded. Every time a gun is handled for any reason, check to see that it is unloaded. If you are unable to check a gun to see if it is unloaded, leave it alone and seek help from someone more knowledgeable about guns.  Keep the gun pointed in the safest possible direction. Always be aware of where a gun is pointing. A "safe direction" is one where an accidental discharge of the gun will not cause injury or damage. Only point a gun at an object you intend to shoot. Never point a gun toward yourself or another person.  Keep your finger off the trigger until you are ready to shoot. Always keep your finger off the trigger and outside the trigger guard until you are ready to shoot. Even though it may be comfortable to rest your finger on the trigger, it also is unsafe. If you are moving around with your finger on the trigger and stumble or fall, you could inadvertently pull the trigger. Sudden loud noises or movements can result in an accidental discharge because there is a natural tendency to tighten the muscles when startled. The trigger is for firing and the handle is for handling.  Know your target, its " surroundings and beyond. Check that the areas in front of and behind your target are safe before shooting. Be aware that if the bullet misses or completely passes through the target, it could strike a person or object. Identify the target and make sure it is what you intend to shoot. If you are in doubt, DON'T SHOOT! Never fire at a target that is only a movement, color, sound or unidentifiable shape. Be aware of all the people around you before you shoot.  Know how to properly operate your gun. It is important to become thoroughly familiar with your gun. You should know its mechanical characteristics including how to properly load, unload and clear a malfunction from your gun. Obviously, not all guns are mechanically the same. Never assume that what applies to one make or model is exactly applicable to another. You should direct questions regarding the operation of your gun to your firearms dealer, or contact the  directly.  Store your gun safely and securely to prevent unauthorized use. Guns and ammunition should be stored separately. When the gun is not in your hands, you must still think of safety. Use an approved firearms safety device on the gun, such as a trigger lock or cable lock, so it cannot be fired. Store it unloaded in a locked container, such as an approved lock box or a gun safe. Store your gun in a different location than the ammunition. For maximum safety you should use both a locking device and a storage container.    ADDITIONAL SAFETY POINTS  The six basic safety rules are the foundational rules for gun safety. However, there are additional safety points that must not be overlooked.  [x] Never handle a gun when you are in an emotional state such as anger or depression. Your judgment may be impaired. If you have acute or chronic suicidal ideation, a suicide plan, or suicidal intent, have firearms removed and your access restricted by a trusted loved one or other responsible individual  "or agency.  [x] Never shoot a gun in celebration (the Fourth of July or New Year's [place], for example). Not only is this unsafe, but it is generally illegal. A bullet fired into the air will return to the ground with enough speed to cause injury or death.  [x] Do not shoot at water, flat or hard surfaces. The bullet can ricochet and hit someone or something other than the target.  [x] Hand your gun to someone only after you verify that it is unloaded and the cylinder or action is open. Take a gun from someone only after you verify that it is unloaded and the cylinder or action is open.  [x] Guns, alcohol and drugs don't mix. Alcohol and drugs can negatively affect judgment as well as physical coordination. Alcohol and any other substance likely to impair normal mental or physical functions should not be used before or while handling guns. Avoid handling and using your gun when you are taking medications that cause drowsiness or include a warning to not operate machinery while taking this drug.   [x] The loud noise from a fired gun can cause hearing damage, and the debris and hot gas that is often emitted can result in eye injury. Always wear ear and eye protection when shooting a gun.      GUNS AND CHILDREN - FIREARM OWNER RESPONSIBILITIES    You Cannot Be Too Careful with Children and Guns  [x] There is no such thing as being too careful with children and guns. Never assume that simply because a toddler may lack finger strength, they can't pull the trigger. A child's thumb has twice the strength of the other fingers. When a toddler's thumb "pushes" against a trigger, invariably the barrel of the gun is pointing directly at the child's face. NEVER leave a firearm lying around the house.  [x] Child safety precautions still apply even if you have no children or if your children have grown to adulthood and left home. A nephew, niece, neighbor's child or a grandchild may come to visit. Practice gun safety at all " "times.  [x] To prevent injury or death caused by improper storage of guns in a home where children are likely to be present, you should store all guns unloaded, lock them with a firearms safety device and store them in a locked container. Ammunition should be stored in a location separate from the gun.    Talking to Children About Guns  [x] Children are naturally curious about things they don't know about or think are "forbidden." When a child asks questions or begins to act out "gun play," you may want to address his or her curiosity by answering the questions as honestly and openly as possible. This will remove the mystery and reduce the natural curiosity. Also, it is important to remember to talk to children in a manner they can relate to and understand. This is very important, especially when teaching children about the difference between "real" and "make-believe." Let children know that, even though they may look the same, real guns are very different than toy guns. A real gun will hurt or kill someone who is shot.    Instill a Mind Set of Safety and Responsibility  [x] The American Academy of Pediatrics reports that adolescence is a highly vulnerable stage in life for teenagers struggling to develop traits of identity, independence and autonomy. Children, of course, are both naturally curious and innocently unaware of many dangers around them. Thus, adolescents as well as children may not be sufficiently safeguarded by cautionary words, however frequent. Contrary actions can completely undermine good advice. A "Do as I say and not as I do" approach to gun safety is both irresponsible and dangerous.  [x] Remember that actions speak louder than words. Children learn most by observing the adults around them. By practicing safe conduct you will also be teaching safe conduct.    Safety and Storage Devices  [x] If you decide to keep a firearm in your home you must consider the issue of how to store the firearm in a " safe and secure manner. There are a variety of safety and storage devices currently available to the public in a wide range of prices. Some devices are locking mechanisms designed to keep the firearm from being loaded or fired, but don't prevent the firearm from being handled or stolen. There are also locking storage containers that hold the firearm out of sight. For maximum safety you should use both a firearm safety device and a locking storage container to store your unloaded firearm.   Two of the most common locking mechanisms are trigger locks and cable locks. Trigger locks are typically two-piece devices that fit around the trigger and trigger guard to prevent access to the trigger. One side has a post that fits into a hole in the other side. They are locked by a key or combination locking mechanism. Cable locks typically work by looping a strong steel cable through the action of the firearm to block the firearm's operation and prevent accidental firing. However, neither trigger locks nor cable locks are designed to prevent access to the firearm.   [x] Smaller lock boxes and larger gun safes are two of the most common types of locking storage containers. One advantage of lock boxes and gun safes is that they are designed to completely prevent unintended handling and removal of a firearm. Lock boxes are generally constructed of sturdy, high-grade metal opened by either a key or combination lock. Gun safes are quite heavy, usually weighing at least 50 pounds. While gun safes are typically the most expensive firearm storage devices, they are generally more reliable and secure.     Remember: Safety and storage devices are only as secure as the precautions you take to protect the key or combination to the lock.    RULES FOR KIDS  Adults should be aware that a child could discover a gun when a parent or another adult is not present. This could happen in the child's own home; the home of a neighbor, friend or  relative; or in a public place such as a school or park. If this should happen, a child should know the following rules and be taught to practice them.   Stop  The first rule for a child to follow if he/she finds or sees a gun is to stop what he/she is doing.  Don't Touch!  The second rule is for a child not to touch a gun he/she finds or sees. A child may think the best thing to do if he/she finds a gun is to pick it up and take it to an adult. A child needs to know he/she should NEVER touch a gun he/she may find or see.  Leave the Area  The third rule is to immediately leave the area. This would include never taking a gun away from another child or trying to stop someone from using gun.  Tell an Adult  The last rule is for a child to tell an adult about the gun he/she has seen. This includes times when other kids are playing with or shooting a gun.     METHODS OF CHILDPROOFING YOUR FIREARM  As a responsible handgun owner, you must recognize the need and be aware of the methods of childproofing your handgun, whether or not you have children.  Whenever children could be around, whether your own, or a friend's, relative's or neighbor's, additional safety steps should be taken when storing firearms and ammunition in your home.  [x] Always store your firearm unloaded.  [x] Use a firearms safety device AND store the firearm in a locked container.  [x] Store the ammunition separately in a locked container.  Always storing your firearm securely is the best method of childproofing your firearm; however, your choice of a storage place can add another element of safety. Carefully choose the storage place in your home especially if children may be around.  [x] Do not store your firearm where it is visible.  [x] Do not store your firearm in a bedside table, under your mattress or pillow, or on a closet shelf.  [x] Do not store your firearm among your valuables (such as jewelry or cameras) unless it is locked in a secure  container.  [x] Consider storing firearms not possessed for self-defense in a safe and secure manner away from the home.    [facility] for Gun Safety:  https://www.everytown.org       Gun Violence: Prediction, Prevention and Policy  American Psychological Association Panel of Experts Report  https://www.apa.org/pubs/reports/gun-violence-report.pdf     If you require further information pertaining to any of the issues outlined above, please reach out to your providers through the [facility] portal at [email or web address], or call [phone] to discuss.  See resource list for additional material.      IN CASE OF SUICIDAL THINKING, call the [employer] Suicide Hotline Number: 988    988 Suicide & Crisis Lifeline: 988 , 9-615-110-TALK (8255)  Provides 24/7, free and confidential support for people in distress, prevention and crisis resources for you or your loved ones, and best practices for professionals.    Call, text or chat.  https://[employer]              REFERRAL RECOMMENDATIONS FOR SUBSTANCE ABUSE & MENTAL HEALTH      IN CASE OF SUICIDAL THINKING, call the [employer] Suicide Ability Dynamicsline Number: 988    988 Suicide & Crisis Lifeline: 988 , 7-099-497-TALK (8255)  https://[employer]       SUBSTANCE ABUSE:     [facility] (formerly known as the ABU)  [x] [phone], Option 2  [x] [address], [place] [address]  [x] [email or web address]  [x] [facility] delivers comprehensive and collaborative treatment for alcohol and substance use disorders.  Excellent program for working professionals or anyone else seeking recovery.  [x] Requires insurance approval prior to starting program, call number above for more information.  [x] Intensive Outpatient Rehabilitation Program - M-F 9am-3pm - daily groups with psychologists and social workers, sessions with MDs 3x per week   [x] Ambulatory detox and dual diagnosis  available      SUBOXONE:     NOTE: some Suboxone clinics require their clients to participate in a structured program (such as an IOP) in order to be prescribed Suboxone.  Some clinics have a long waiting list.  Most of these clinics do not accept walk-in clients, so call first to to learn what must be done to get started on Suboxone.    [facility] - [phone] (can do Sublocade)  [address], [place] [address]    [facility]  [address], [place]  [phone]    [facility] - [phone] (can do Sublocade)  [address] [place] [address]    [facility]  [address] [place]  [phone]     [facility] (very short waiting list, may accept some walk-in's but call first if possible)  [address], [place] [id number] [phone]; [phone]    [facility]   [address] [place]    [phone]    [facility] (can usually be seen within a week but is cash only for appointment)  [address] [place] [address] [place], [place]    [facility] ([facility])  [address] [place]  [phone]    [facility] ([facility])  [address], [place] [id number] [phone]    [place], [place]:    [facility] - [address] [place] [address] - Tel: [phone]    Kapil Beverly - [address] [place] [address] - Tel: [phone]    Paul Barber - [address] [place] [address] - Tel: [phone]    Tai Houston - [address] [address] - Tel: [phone]    Adria Leiva - [address] [place] [address] - Tel: [phone]    [place], [place]:     Dr. Socrates Manning and Dr. Ryan Buchanan - [address] Tel: [phone]    Dr. Faith Modi - [address] [place] [address] [place] - Tel: [phone]    Dr. Aydin Larsen - Tel: [phone]    Dr. Jaquan Hutson - [facility] - [phone]      METHADONE:     [facility] (the only methadone clinic in the  city, has two locations)  [x] [facility] [place] [address], [phone]  [x] [facility] [place] [address], [phone]      12 STEP PROGRAMS (and similar):     Alcoholics Anonymous (local)  [x] [phone]  [x] www.[place].org for schedules for in-person and online meetings  [x] There are AA meetings throughout the day all over town  [x] AA costs nothing to attend; they pass a basket for donations but this is not required    Narcotics Anonymous  [x] [phone]  [x] www.noana.org  [x] There are NA meetings throughout the day all over [facility]  [x] NA costs nothing to attend; they pass a basket for donations but this is not required    Alcoholics Anonymous Online Intergroup (national)  [x] www.aa-intergroup.org  [x] Good resource for large, nation-wide meetings  [x] Can also attend smaller, local meetings in other cities  [x] Countless meetings all day and all night  [x] AA costs nothing to attend; they pass a basket for donations but this is not required    [employer] - 24/7 zoom meetings for women and coed - sign on anytime, anywhere!  [email or web address]-csmhvotb/    Online Intergroup of AA - [address] [place] Meeting - 24/7 zoom meetings  https://aa-intergroup.org/meetings/    LOOKING FOR AN ALTERNATIVE TO 12 STEP PROGRAMS - check out:  [facility]: https://www.smartrecovery.org/about-us  [facility]: https://[email or web address]      DETOX UNITS (USUALLY 5-7 DAYS):     [facility]: [address], [place]  [phone], call first to ensure bed availability    [facility]: [address] [place]  [phone], Option 1, call first to ensure bed availability    [facility]: [address], [place]  [phone] (intake by appointment only)    [facility]: [address], [place]  [phone]      INTENSIVE OUTPATIENT PROGRAMS:     [facility] (formerly known as the [facility]BU)  [x]  [phone], Option 2  [x] [address], [facility] [address]  [x] [email or web address]  [x] [facility] delivers comprehensive and collaborative treatment for alcohol and substance use disorders.  Excellent program for working professionals or anyone else seeking recovery.  [x] Requires insurance approval prior to starting program, call number above for more information.  [x] Intensive Outpatient Rehabilitation Program - M-F 9am-3pm - daily groups with psychologists and social workers, sessions with MDs 3x per week   [x] Ambulatory detox and dual diagnosis available    [facility] Intensive Outpatient Program  [x] [phone]  [x] [facility] [facility] (the clinic not on [facility] main [facility])  [x] Call number above for more info and to check insurance requirements    [facility]  [address] [place] [address] [phone]    [facility]:  [address] [place], [place] [address]?, [phone]  [address] [place], [place] [address]?, [phone]    RESIDENTIAL REHABS (USUALLY 28 DAYS):     [facility]: [address], [phone]    [facility]: [facility] , [facility]  [phone] (intake by appointment only)    [facility] (men only) [address] [facility], [phone]    [facility] (Female only) [address] [facility], [phone]    [facility]: [address], [facility], men's program [phone], women's program [phone]    [facility]: [address], [facility], [phone]    [facility]: [address], [place], [phone]    [facility]: Men only, [phone], [address] [place]    [facility]: [address], [place], [place], [phone]    [facility]: [address] [place] [address],  [phone]  New Location: [address] [place] [address] [address],  [place] [address], [phone]    [facility]:   ?[address]?[place], [place] [address]?[phone]    Dorothy: [address], [place] [address], [phone]    [place]: [place], [phone]     [facility]: [place], [place], [phone]    [facility]: [place], [phone]    [facility]: [place], [place], [phone]    [facility]: [place], [phone]    [facility]: [address] [place] [address], [phone]    COMMUNITY ADDICTION CLINICS:     [facility]: [address] [facility], [facility] [place], [phone] -or- [address] [place] [address]    [facility] [place]: [address] [facility] [place]  [address]     [facility]: Clinics [phone]; Crisis [phone]    [facility]: [address] [place] [address]    formerly [facility] Center: [address] [place] [address]    [facility]: [address], [place] [address] Center: 2nd Floor [address] [facility] [address]    [facility]: [address], [place] [address]    [facility], [place] [address]    [facility] Behavior[facility] Health Center: [address] [place] [address] [phone]  (serves youth 16-23 years old)    [facility]: [facility]/[place]/[facility] [phone]    [facility]: [address] [facility], [place] [phone]    [facility]: [address], [facility] [phone]    [facility]: [address] [facility], [address], [phone]     [facility]: [address] [facility] [address], [phone], [phone]    RESOURCES IN OTHER [facility]:     [facility]: [facility] F. Kyle Sanchez, [place]  "[place], [phone], [phone]    [facility]: [address] [facility], [address], [phone]    [facility], [address] [place] [address], [phone]    [facility]: [address] [facility], [phone]    [facility] Behavioral Health, [address] [facility], [phone] ([facility])    [facility], [address] [facility], [phone] ([facility])    [facility], [address] [facility], [address], [place], [phone] ([facility], and [facility])    [facility], [address] [facility], [phone] ([facility])    [facility] Hotline [phone], [phone]    [facility], [address] [facility], [facility], [address] [facility], [address], [facility][note 2] Behavioral [facility], [address] [facility] Behavioral [facility], [address] [facility]. [address]., [facility] Behavioral [facility], [address], [facility] Human [facility], [address], [address] [phone]    [facility], [address] [facility] [phone]    [facility], [address] [facility], [phone]    [facility]. for Addiction Recovery, [address] [facility], [phone]      Latvian SPEAKING (en español):     Información de la reunión de Alcohólicos Anónimos  Anton [facility], 10:00 am  Habla español  Esta reunión está abierta y cualquiera puede asistir.    Ecuadorean speaking Alcoholics anonymous meetings:  El "Anton [place] AA Skype" es un anton on line de Alcohólicos Anónimos en español. El anton es ila, gratuito y virtual a través de Skype Audio. El anton funciona mediante steven llamada grupal de " voz, por lo que no se utiliza la videollamada, ni se pueden bob las imágenes o rostros de los participantes. Hace freddy años y medio abrimos el primer Anton de AA por Skchivo en [place], crystal actualmente asisten personas desde [place], [place], [place], [place], [place], [place], [place], [place], [place], [place], [place] y [place], entre otros.    El anton es muy útil para los alcohólicos que residen en lugares donde no se celebran reuniones de AA, o residen en lugares donde las reuniones de AA son un número limitado de días a la semana, o para aquellos compañeros que se hayan de viaje o que, por cualquier motivo, se hayan convalecientes y no pueden desplazarse. Todos los días nos reuniones a las 21:00 (hora española)    Podéis obtener más información sobre el anton y aylin sesiones en la página web [email or web address]      MENTAL HEALTH:     [facility] Out[facility]  [phone]    [facility] - General Psychiatry Intensive Outpatient Program  [facility] (formerly known as the BMU)  [phone], option 3    [facility] - Dual Diagnosis Intensive Outpatient Program  [facility] (formerly known as the ABU)  [phone], option 2      COMMUNITY MENTAL HEALTH CENTERS:     [facility]  (aka [facility])  Serves [facility] and [facility] residents.  Serves uninsured patients & those with Medicaid.  Main location: [address] [place] [address]  [phone]  Walk-in's available during regular business hours.  24/7 Crisis Line: [phone]    [facility]  (aka [facility], [facility])  Serves [facility].  Serves uninsured patients, those with Medicaid and some private plans.  Walk-in's available during regular business hours.  Primary care services available  as well.  [facility]: [address] [facility] [address] [place] [address];  [phone]  [place]: [address] [place] [address];  [phone]  24/7 Crisis Line: [phone]    [facility]  Serves uninsured patients & those with Medicaid, call for more info.  Primary care, pediatrics, HIV treatment, and dentistry services available as well.  Three locations.  [phone]    [facility] [place]?Corporate Office  Serves patients with Medicaid, Medicare, and private insurance  [address] [address]  [phone]    [facility]  Serves uninsured on a sliding scale, as well as Medicaid, Medicare, and private plans.  Eight locations around the [employer].  [phone]    [facility]  Serves uninsured patients & those with Medicaid, private insurances.  Primary care available as well.  [phone]  [address] [place] [address]    [facility]patient [facility]  Serves veterans who were honorably discharged.  [address] [place] [address]  [phone]  24/7 Veterans Crisis Line: [phone] (Press 1)    If you have private insurance and need to find a specialist, please contact your insurance network to request a list of providers covered by your benefits.      MENTAL HEALTH/ADDICTIVE DISORDERS:     AA ([phone]), NA ([phone])   National Suicide Prevention Lifeline- Call 1-475.645.4843 Available 24 hours everyday  [facility] [phone]; Crisis Line [phone] - Call for options A-F:  Intensive Outpatient Treatment/ Day programs   [facility], please contact   [facility], please contact [phone] or [phone] to speak with an admissions counselor.  [facility]al Health Group (Methadone Maintenance)   [address] [place] [address], [phone]  [address] [place] [place] [address] [phone]  [facility], [address] [address], [phone]  [facility] [phone]  [facility] please contact [phone]  [facility], [address], 4th [place] [address] Phone: [phone]   [facility]  [place] Office: [address] [place] [address], [phone]  [place] Office: [address] [facility], [address], [place] [address], [phone]  [place] Office: [address] [place], [place] [address] [phone]    Outpatient Substance Abuse Treatment   [facility]al Health Group (Methadone Maintenance)   [address] [place] [address], [phone]  [address] [place] [place] [address] [phone]  [facility], [address] [address], [phone]  [facility]  [place] Office: [address] [address], [phone]  [place] Office: [address] [facility], [address], [place] [address], [phone]  [place] Office: [address] [facility] [place] [address] [phone]  [facility], [address] [place] [address] [phone]   [facility], [address] [facility], [address], [phone]  [facility], [address] [place], [place] [phone]    Residential Substance Abuse Treatment   [facility] [address] [facility], [phone]  [facility], [address] [facility], [phone]  [facility] (men only) [address] [place] [address], [place] [address], [phone]  Women at the [facility] (women only) [address] [place] [address] [phone]  [facility], [address] [place] [address] [phone]  [facility] (women only), intakes at [address] [facility], [phone]  [facility] (7-day program, $100, [address], [phone], [phone], [phone])  [facility]  (Men only, [phone]), [address] (Vets*/Non-Vets)  [facility] (Men only, $400/month program fee) [address], [phone]  [facility] (Women over age 39 only), [address] [facility], [phone]    Out of Area:    [place], [address], [place], [place] [phone])  [facility] Program (men only), [address] [facility] [place] [address], [phone]  [facility], [address] [place], [place] [phone])  [place], [facility] [place], [place] ([phone])  [facility], [address] [facility], [phone]  [facility] (Women only, has to have mental illness, can be homeless or substance abuser), [phone]        DOMESTIC VIOLENCE RESOURCES:     Advocacy  [facility] (NOF[facility])  [address] [address]    [facility] ? [phone]  Services provided: emergency shelter, individual advocacy, information and referrals, group support, children's program, medical advocacy, forensic medical exams, primary care, legal assistance, counseling, safety planning, and caregiver support    [facility] HEALING AND EMPOWERMENT [place]  Confidential location  [facility] ? [phone]  Services provided: short term emergency shelter, all services provided are free of charge    [facility] CENTERS FOR COMMUNITY ADVOCACY  Multiple locations in [place], [facility], [place], [facility], [place], and [facility] ([place], [place], and [place])    [place] ? [phone]  Services provided: emergency shelter, individual advocacy, information and referrals, group support, children's program, medical advocacy, legal assistance in obtaining restraining orders, counseling, safety planning, and caregiver support    [facility]   Emergency Shelter   [phone]  Emergency Services ,Legal and Financial Assistance Services ,Housing Services ,Support Services      [facility]   [phone]  Emergency Services ,Counseling Services , Housing Services ,Support Services ,Children's Services     WOMEN WITH A VISION  [address] [place] [address]  WWAV ? [phone]  Services provided: advocacy, health education and supportive services, specializing in free healing services for marginalized groups, including LGBTQ individuals and sex workers    SEXUAL TRAUMA AWARENESS AND RESPONSE (STAR)  [address] [place] [address]    STAR ? ([phone] 756-[phone]  Services provided: individual advocacy, information and referrals, group support, medical advocacy, legal assistance, counseling, and safety planning for survivors of sexual assault    [facility] ([facility])  [address] [place] [address]  [facility] Forensic Program ? [phone]  Services provided: free forensic medical exams for sexual assault and domestic violence, which can be performed up to 5 days after an incident. It is not necessary to make a police report to receive a forensic medical exam    Legal  [facility]  [address] [address]  [facility] ? [phone]  Services provided: free emergency legal representation for survivors of doemstic violence residing in [facility]. Legal services may include temporary restraining orders, temporary child support, custody, and use of property    [facility] LEGAL SERVICES (LS)  [address] [address]  SLLS ? [phone]  Services provided: free legal representation for survivors of domestic violence residing in [facility]. Legal services may include temporary child support, custody, and divorce      HOTLINES:     [facility] DOMESTIC VIOLENCE HOTLINE  [phone]    Services provided: free and confidential hotline for victims and survivors of domestic violence. All calls will be routed to a domestic violence service provided in the  victim or survivor's area    NATIONAL HUMAN TRAFFICKING HOTLINE  [phone]    Services provided: national anti-trafficking hotline serving victims and survivors of human trafficking. Provides information about local resources, and access to safe space to report tips, seek services, and ask for help    VIA LINK  211 or [phone]    Service provided: counselors can provide crisis counseling. Counselors can also provide information and referrals to programs which can help with needs such as food, shelter, medical care, financial assistance, mental health services, substance abuse treatment, senior services, childcare, and more      HOMELESS SHELTERS:      Homeless shelters  [facility]  Emergency shelter for individuals and families  [address] [facility]  [phone]  [facility]  Emergency shelter for men only  Meals daily 6am, 2pm, & 6pm  Clothing, case management M-F by appointment (ID/job/housing/legal assistance), mail  [address] [facility]  [phone]  [facility]  Emergency shelter for men  [address] [facility]  [phone]  Emergency shelter for women  [address] [facility]  [phone]  Breakfast & lunch daily, dinner M-F  Case management, job counseling services   [facility]  Emergency shelter for teens and young adults up to 20yo  [address] [facility]  [phone]  [facility]  Emergency shelter for women over 17yo and their kids  [address] [place] [address]  [phone]  [facility]  Day program, meals M-F 1PM (arrive early)  Showers, laundry, hygiene kits, showers, phones, , notary services, case management, ID assistance  [address] [facility]  [phone] M-F 8am-2:30pm  [employer]  Day program  MTWF 7:30am-3:30pm,  8:30am-3:30pm  Crisis intervention, employment assistance, food/clothing, hygiene kits, bus tokens, mail  [address] [facility] B  [phone]  [facility]  Mobile outreach for homeless persons in  [facility]  [phone]  Healthcare for the Homeless  Primary healthcare, case management, dental services, TB placement  Call ahead  [address]  2nd Floor  [phone]  [facility]ruthi at the [facility]  Connects homeless people with their loved ones in other cities by providing transportation costs   [phone]      [place] RESOURCES:     [place] Mobile Mental Health Crisis Response Team:    Region 12 ([place], [place], [employer], and [facility]) ([facility])  [phone]      Outpatient Mental Health & Addiction Clinic Resources for both [facility]:    State mental health facility Mental Healthcare Resources  Website: www.[facility].org  Main Number: 340-918-3022    [facility] ([facility] Area)  [address] ([address]) [place] [address]  [phone]    [facility] ([facility])  [address] ([address] [facility]) [place] [address]  [phone]    [facility]  [address] ([address]) [place] [address]  [phone]    [facility]  [address] ([address] [facility] [place] [address]  [phone]      Addiction Treatment Resources for both [facility]:    [facility] (Detox, Residential, PHP, IOP, and Aftercare Programs)  [address] [place] [address]  [phone]    [facility] (Residential, IOP, Transitional Living, and Aftercare Programs)  [address] [place] [address]  [phone]    [place] [facility] (Inpatient, Residential, Detox, IOP, Outpatient, and Aftercare Programs)  [address] [facility], [place], [place] [address] [phone] or toll free at [phone]      Outpatient Mental Health Psychotherapy Resources for both [facility]:    Apple Puri, [facility]  [address]  [place] [address]  [phone]  Specialties: Depression, Anxiety, and Life Transitions    Libby Barba, PhD  [address]  [place] [address]  [phone]  Specialties: Testing and Evaluation, Education and Learning Disabilities, and ADHD    Esperanza Kelley, [facility] [address] [place] [address]  [phone]  Specialties: Obsessive-Compulsive (OCD), Depression, and Relationship Issues    [facility] [address] [place] [address]  [phone]  Specialties: Trauma & PTSD, Mood Disorders, and Anxiety    Neda Rendon, PhD, [facility]  [facility] [address] [place] [address]  [phone]  Specialties: Family Conflict, Child, and Relationship Issues    [facility] [place] [address] [phone]  Specialties: Anxiety, Depression, and Anger Management        IN CASE OF SUICIDAL THINKING, call the National Suicide Hotline Number: 988    988 Suicide & Crisis Lifeline: 988 , 8-948-494-TALK (5688)  Provides 24/7, free and confidential support for people in distress, prevention and crisis resources for you or your loved ones, and best practices for professionals.    Call, text or chat.  https://[employer]ustain[employer] Roundtable.Access Closure          Take all medications as prescribed.  Eat a regular diet.  Follow up with your physicians as scheduled - pcp within 1 week and gastroenterologist within 2-4 weeks.  Thank you for trusting [facility] and Dr. Sanchez with your care.  We are honored that you entrusted us with your healthcare needs. Your satisfaction is very important to us and we hope you have been very pleased with your experience at [facility]. After your discharge you may receive a survey asking you to rate your hospital experience. We encourage you to take the time to complete the survey as your feedback allows us to identify areas for improvement as well as recognize  our staff.   We hope that you have received the very best care possible during your hospitalization at [facility], as your satisfaction is our top priority.

## 2024-02-04 NOTE — CONSULTS
[facility] - Med Surg ([address])  Adult Nutrition  Consult Note    SUMMARY     Recommendations    Encourage po intake as tolerated  Recommend commercial oral beverages   Monitor labs and weights   Collaboration with medical providers    Goals: Patient to consume >50% of estimated energy needs prior to RD follow up.  Nutrition Goal Status: new  Communication of RD Recs: other (comment) (consult, poc)    Assessment and Plan    Endocrine  Severe malnutrition  Malnutrition Type:  Context: chronic illness  Level: severe    Related to (etiology):   Conditions associated with medical diagnoses  Decreased appetite     Signs and Symptoms (as evidenced by):   Weight loss  BMI: 18.12 (underweight)  Gi intolerance     Malnutrition Characteristic Summary:  Weight Loss (Malnutrition): greater than 10% in 6 months  Energy Intake (Malnutrition): less than or equal to 75% for greater than or equal to 1 month      Interventions(treatment strategy):  1. Commercial beverages 2. Collaboration with medical providers    Nutrition Diagnosis Status:   New         Malnutrition Assessment  Malnutrition Context: chronic illness  Malnutrition Level: severe      Micronutrient Evaluation Summary: suspected deficiency   Weight Loss (Malnutrition): greater than 10% in 6 months  Energy Intake (Malnutrition): less than or equal to 75% for greater than or equal to 1 month                         Reason for Assessment    Reason For Assessment: consult    Diagnosis: gastrointestinal disease  Patient Active Problem List   Diagnosis    Osteoporosis, post-menopausal    Encounter for hepatitis C screening test for low risk patient    Abnormal weight loss    Vitamin D deficiency    Elevated TSH    Hypercholesteremia    Known medical problems    Arthritis    Age related osteoporosis    Prediabetes    Enteritis    Unintentional weight loss    Severe malnutrition    Dilation of biliary tract    AMANDA (generalized anxiety disorder)       Relevant Medical  "History:   Past Medical History:   Diagnosis Date    Abnormal weight loss 2017    Arthritis 2018    Complication of anesthesia 2009    colonoscopy-cardiac arrest from anes    Elevated TSH 2017    Hypercholesteremia 2017    Osteoporosis, post-menopausal     Vitamin D deficiency 2017       Interdisciplinary Rounds: did not attend (RD remote)    General Information Comments:   24: Patient is on a regular diet with decreased po intake.  Patient positive for diarrhea and abdominal pain.  Patient reported unwanted weight loss within the past 1 year.  Previous weight of 49 kg on 2023. Patient does meet positive criteria for malnutrition due to decreased po intake and weight loss.  Labs reviewed.  NKFA.  LBM: 24.  RD to continue to monitor and follow.    Nutrition Discharge Planning: Patient to continue on recommended general healthy diet post discharge    Nutrition Risk Screen    Nutrition Risk Screen:  (reported weight loss)    Nutrition/Diet History    Spiritual, Cultural Beliefs, Protestant Practices, Values that Affect Care: no  Food Allergies: NKFA  Factors Affecting Nutritional Intake: diarrhea    Anthropometrics    Temp: 98 °F (36.7 °C)  Height Method: Stated  Height: 4' 11" (149.9 cm)  Height (inches): 59 in  Weight Method: Standard Scale  Weight: 40.7 kg (89 lb 11.6 oz)  Weight (lb): 89.73 lb  Ideal Body Weight (IBW), Female: 95 lb  % Ideal Body Weight, Female (lb): 94.45 %  BMI (Calculated): 18.1  BMI Grade: 17 - 18.4 protein-energy malnutrition grade I  Weight Loss: unintentional  Usual Body Weight (UBW), k kg (within 6 months)  % Usual Body Weight: 83.23  % Weight Change From Usual Weight: -16.94 %       Lab/Procedures/Meds    Pertinent Labs Reviewed: reviewed  BMP  Lab Results   Component Value Date     2024    K 4.2 2024     (H) 2024    CO2 24 2024    BUN 10 2024    CREATININE 0.7 2024    CALCIUM 8.4 (L) 2024    " ANIONGAP 4 (L) 02/04/2024    EGFRNORACEVR >60 02/04/2024     Lab Results   Component Value Date    LABA1C 5.5 04/03/2018    HGBA1C 5.7 (H) 05/02/2023     Glucose   Date Value Ref Range Status   02/04/2024 83 70 - 110 mg/dL Final     Lab Results   Component Value Date    CALCIUM 8.4 (L) 02/04/2024       Pertinent Medications Reviewed: reviewed  Scheduled Meds:   heparin (porcine)  5,000 Units Subcutaneous Q8H    lactobacillus acidophilus & bulgar  1 packet Oral BID    sodium chloride 0.9%  10 mL Intravenous Q8H     Continuous Infusions:   sodium chloride 0.9% 125 mL/hr at 02/04/24 0116     PRN Meds:.acetaminophen, dicyclomine, loperamide, melatonin, naloxone, senna-docusate 8.6-50 mg    Physical Findings/Assessment         Estimated/Assessed Needs    Weight Used For Calorie Calculations: 40.7 kg (89 lb 11.6 oz)  Energy Calorie Requirements (kcal): 25-35kcals/day (1017-1425kcals/day)  Energy Need Method: Kcal/kg  Protein Requirements: 1.2-1.5g/kg (49-61g/day)  Weight Used For Protein Calculations: 40.7 kg (89 lb 11.6 oz)  Fluid Requirements (mL): 1ml/kcal  Estimated Fluid Requirement Method: RDA Method  RDA Method (mL): 25  CHO Requirement: 225-250      Nutrition Prescription Ordered    Current Diet Order: Regular  Oral Nutrition Supplement: boost    Evaluation of Received Nutrient/Fluid Intake    % Kcal Needs: <50%  % Protein Needs: <50%  I/O: +3.1L since admit  Energy Calories Required: not meeting needs  Protein Required: not meeting needs  Fluid Required: not meeting needs  Comments: LBM: 2/2/24  Tolerance: not tolerating  % Intake of Estimated Energy Needs: 0 - 25 %  % Meal Intake: 0 - 25 %    Nutrition Risk    Level of Risk/Frequency of Follow-up: moderate - high (follow up 2x per week)       Monitor and Evaluation    Food and Nutrient Intake: energy intake, food and beverage intake  Food and Nutrient Adminstration: diet order  Knowledge/Beliefs/Attitudes: food and nutrition knowledge/skill, beliefs and  attitudes  Physical Activity and Function: nutrition-related ADLs and IADLs, factors affecting access to physical activity  Anthropometric Measurements: height/length, weight, weight change, body mass index  Biochemical Data, Medical Tests and Procedures: electrolyte and renal panel, gastrointestinal profile, inflammatory profile, glucose/endocrine profile, lipid profile       Nutrition Follow-Up    RD Follow-up?: Yes  Diann Mark MS, RDN, LDN

## 2024-02-04 NOTE — PLAN OF CARE
Met with patient at bedside to discuss discharge - ambulatory referral placed to GI - patient aware to expect call from  - instructions placed on AVS for patient to schedule hospital follow up with PCP once office reopens tomorrow - new meds escribed to preferred [facility] -  will provide transportation home     [facility] - [facility] ([address])  Discharge Final Note    Primary Care Provider: Ranjit Sweeney MD    Expected Discharge Date: 2/4/2024    Final Discharge Note (most recent)       Final Note - 02/04/24 0953          Final Note    Assessment Type Final Discharge Note     Anticipated Discharge Disposition Home or Self Care     What phone number can be called within the next 1-3 days to see how you are doing after discharge? [phone]     Hospital Resources/Appts/Education Provided Provided patient/caregiver with written discharge plan information        Post-Acute Status    Discharge Delays None known at this time                   Contact Info       Ranjit Sweeney MD   Specialty: Family Medicine   Relationship: PCP - General    [id number]  [address] [address]   Phone: [phone]       Next Steps: Schedule an appointment as soon as possible for a visit in 1 week(s)    Instructions: for hospital follow up

## 2024-02-04 NOTE — PLAN OF CARE
Nutrition Plan of Care:  Recommendations     Encourage po intake as tolerated  Recommend commercial oral beverages   Monitor labs and weights   Collaboration with medical providers     Goals: Patient to consume >50% of estimated energy needs prior to RD follow up.  Nutrition Goal Status: new  Communication of RD Recs: other (comment) (consult, poc)     Assessment and Plan     Endocrine  Severe malnutrition  Malnutrition Type:  Context: chronic illness  Level: severe     Related to (etiology):   Conditions associated with medical diagnoses  Decreased appetite      Signs and Symptoms (as evidenced by):   Weight loss  BMI: 18.12 (underweight)  Gi intolerance      Malnutrition Characteristic Summary:  Weight Loss (Malnutrition): greater than 10% in 6 months  Energy Intake (Malnutrition): less than or equal to 75% for greater than or equal to 1 month        Interventions(treatment strategy):  1. Commercial beverages 2. Collaboration with medical providers     Nutrition Diagnosis Status:   New           Malnutrition Assessment  Malnutrition Context: chronic illness  Malnutrition Level: severe      Micronutrient Evaluation Summary: suspected deficiency   Weight Loss (Malnutrition): greater than 10% in 6 months  Energy Intake (Malnutrition): less than or equal to 75% for greater than or equal to 1 month                  Diann Mark MS, RDN, LDN

## 2024-02-04 NOTE — PROGRESS NOTES
"Gastroenterology Progress Note    Reason for Consult: enteritis    Subjective:  Feeling better.  Was up trying to take a shower before discharge when I saw her.  Diarrhea improved.  No N/V.  No pain.  Discussed CT results regarding dilated bile ducts.    ROS:  Gen: no F/C  CV: no CP/palpitations  Resp: no SOB/wheezing    Physical Exam  BP (!) 121/57 (BP Location: Left arm, Patient Position: Lying)   Pulse (!) 58   Temp 98 °F (36.7 °C)   Resp 18   Ht 4' 11" (1.499 m)   Wt 40.7 kg (89 lb 11.6 oz)   LMP  (LMP Unknown)   SpO2 99%   BMI 18.12 kg/m²   General: Awake, alert female in no apparent distress  HEENT: NC/AT, PERRL, EOMI, MMM  CV: RRR, without murmur, gallop, or rubs  Pulm: CTAB, no wheezing, rales, or rhonchi  GI: soft, NT/ND, +BS  MSK: no edema and normal ROM  Neuro: A&Ox3, moves all extremities equally, sensation grossly intact  Skin: no rashes or lesions  Psych: normal mood and affect    Medications/Infusions:  Current Facility-Administered Medications   Medication Dose Route Frequency Provider Last Rate Last Admin    0.9%  NaCl infusion   Intravenous Continuous Aleshia Guzman PA-C 125 mL/hr at 02/04/24 0116 Rate Verify at 02/04/24 0116    acetaminophen tablet 650 mg  650 mg Oral Q6H PRN Aleshia Guzman PA-C        dicyclomine capsule 10 mg  10 mg Oral QID PRN Aleshia Guzman PA-C        heparin (porcine) injection 5,000 Units  5,000 Units Subcutaneous Q8H Aleshia Guzman PA-C   5,000 Units at 02/04/24 0536    lactobacillus acidophilus & bulgar 100 million cell packet 1 each  1 packet Oral BID Tai Sanchez MD   1 each at 02/04/24 0929    loperamide capsule 2 mg  2 mg Oral QID PRN Tai Sanchez MD        melatonin tablet 6 mg  6 mg Oral Nightly PRN Aleshia Guzman PA-C        naloxone 0.4 mg/mL injection 0.02 mg  0.02 mg Intravenous PRN Aleshia Guzman PA-C        senna-docusate 8.6-50 mg per tablet 1 tablet  1 tablet Oral BID PRN Aleshia Guzman PA-C   "      sodium chloride 0.9% flush 10 mL  10 mL Intravenous Q8H Aleshia Guzman PA-C   10 mL at 02/04/24 0600       Intake and Output:    Intake/Output Summary (Last 24 hours) at 2/4/2024 1210  Last data filed at 2/4/2024 0116  Gross per 24 hour   Intake 2055.79 ml   Output --   Net 2055.79 ml       Labs:  Lab Results   Component Value Date/Time    WBC 4.54 02/04/2024 04:46 AM    HGB 12.4 02/04/2024 04:46 AM    HCT 37.9 02/04/2024 04:46 AM     02/04/2024 04:46 AM    MCV 91 02/04/2024 04:46 AM     02/04/2024 04:46 AM    K 4.2 02/04/2024 04:46 AM     (H) 02/04/2024 04:46 AM    CO2 24 02/04/2024 04:46 AM    BUN 10 02/04/2024 04:46 AM    CREATININE 0.7 02/04/2024 04:46 AM    GLU 83 02/04/2024 04:46 AM    CALCIUM 8.4 (L) 02/04/2024 04:46 AM    MG 1.9 02/04/2024 04:46 AM   ]  Lab Results   Component Value Date/Time    PROT 7.1 02/03/2024 12:57 AM    ALBUMIN 3.7 02/03/2024 12:57 AM    BILITOT 0.5 02/03/2024 12:57 AM    AST 36 02/03/2024 12:57 AM    ALT 27 02/03/2024 12:57 AM    ALKPHOS 53 (L) 02/03/2024 12:57 AM   ]    Imaging and Procedures:  Labs and imaging results were reviewed.  CT A/P:  Multiple nondilated small bowel loops containing fluid and air fluid levels.  Findings may be related to diarrheal illness and or enteritis.  Other etiology cannot be entirely excluded.  Mild intrahepatic biliary ductal dilatation.  Consider nonemergent outpatient MRCP or ERCP.  Colonic diverticulosis.    Assessment:  Alicia Toussaint is a 73 y.o. female with a history of arthritis, HLD, osteoporosis and hypothyroidism who presented with diarrhea. Suspect viral etiology.  Also noted to have dilated bile duct but normal LFTs.  Gallbladder still present.    Plan:  - diet as tolerated  - imodium PRN diarrhea  - follow up remaining stool studies  - outpatient follow up in GI clinic with me after Baron Garcia to set up MRI/MRCP   - discussed w/ Dr. Sanchez  - call back with any questions/concerns    Bethany Castellano MD

## 2024-02-04 NOTE — PLAN OF CARE
Patient adequate for discharge per MD.  The patient verbalized understanding of her plan of care.  The patient denies any questions or concerns.  The patient remained free from injury during hospital stay.

## 2024-02-04 NOTE — ASSESSMENT & PLAN NOTE
Malnutrition Type:  Context: chronic illness  Level: severe    Related to (etiology):   Conditions associated with medical diagnoses  Decreased appetite     Signs and Symptoms (as evidenced by):   Weight loss  BMI: 18.12 (underweight)  Gi intolerance     Malnutrition Characteristic Summary:  Weight Loss (Malnutrition): greater than 10% in 6 months  Energy Intake (Malnutrition): less than or equal to 75% for greater than or equal to 1 month      Interventions(treatment strategy):  1. Commercial beverages 2. Collaboration with medical providers    Nutrition Diagnosis Status:   New

## 2024-02-05 LAB
CRYPTOSP AG STL QL IA: NEGATIVE
E COLI SXT1 STL QL IA: NEGATIVE
E COLI SXT2 STL QL IA: NEGATIVE
G LAMBLIA AG STL QL IA: NEGATIVE
RV AG STL QL IA.RAPID: POSITIVE

## 2024-02-06 ENCOUNTER — PATIENT OUTREACH (OUTPATIENT)
Dept: ADMINISTRATIVE | Facility: CLINIC | Age: 74
End: 2024-02-06
Payer: MEDICARE

## 2024-02-06 LAB
BACTERIA STL CULT: NORMAL
O+P STL MICRO: NORMAL

## 2024-02-06 NOTE — PROGRESS NOTES
C3 nurse attempted to contact Alicia Toussaint's  [facility]ylan for a TCC post hospital discharge follow up call. The patient is unable to conduct the call @ this time. The patient requested a callback.    The patient does not have a scheduled HOSFU appointment within 5-7 days post hospital discharge date 02/04/24. Message sent to Physician staff to assist with HOSFU appointment scheduling.

## 2024-02-07 LAB — CALPROTECTIN STL-MCNT: 204 MCG/G

## 2024-02-07 NOTE — TELEPHONE ENCOUNTER
SPOKE WITH PT IN ATTEMPT TO SCHEDULE HOSPITAL F/U BUT PT SAID SHE'LL CALL US BACK ONCE SHE VIEWS HER CALENDER

## 2024-02-07 NOTE — PROGRESS NOTES
C3 nurse spoke with Alicia Toussaint for a TCC post hospital discharge follow up call. The patient does not have a scheduled HOSFU appointment with Ranjit Sweeney MD within 5-7 days post hospital discharge date 02/04/24. C3 nurse was unable to schedule HOSFU appointment in [facility].    Message sent to PCP staff requesting they contact patient and schedule follow up appointment.       Pt. did not want the C3 nurse to schedule HOSPFU in [facility] d/t to her work schedule. Instructed pt. to call for a HOSPFU with PCP within 5-7 days of d/c. Pt. stated understanding.      Bilateral Helical Rim Advancement Flap Text: The defect edges were debeveled with a #15 blade scalpel.  Given the location of the defect and the proximity to free margins (helical rim) a bilateral helical rim advancement flap was deemed most appropriate.  Using a sterile surgical marker, the appropriate advancement flaps were drawn incorporating the defect and placing the expected incisions between the helical rim and antihelix where possible.  The area thus outlined was incised through and through with a #15 scalpel blade.  With a skin hook and iris scissors, the flaps were gently and sharply undermined and freed up.

## 2024-02-08 LAB
BACTERIA BLD CULT: NORMAL
BACTERIA BLD CULT: NORMAL

## 2024-04-01 ENCOUNTER — HOSPITAL ENCOUNTER (OUTPATIENT)
Dept: RADIOLOGY | Facility: OTHER | Age: 74
Discharge: HOME | End: 2024-04-01
Attending: INTERNAL MEDICINE
Payer: MEDICARE

## 2024-04-01 DIAGNOSIS — K52.9 ENTERITIS: ICD-10-CM

## 2024-04-01 DIAGNOSIS — K83.8 DILATED BILE DUCT: ICD-10-CM

## 2024-04-01 DIAGNOSIS — R63.4 WEIGHT LOSS: ICD-10-CM

## 2024-04-01 PROCEDURE — 76376 3D RENDER W/INTRP POSTPROCES: CPT | Mod: 26,,, | Performed by: RADIOLOGY

## 2024-04-01 PROCEDURE — 76376 3D RENDER W/INTRP POSTPROCES: CPT | Mod: TC

## 2024-04-01 PROCEDURE — 25500020 PHARM REV CODE 255: Performed by: INTERNAL MEDICINE

## 2024-04-01 PROCEDURE — A9585 GADOBUTROL INJECTION: HCPCS | Performed by: INTERNAL MEDICINE

## 2024-04-01 PROCEDURE — 74183 MRI ABD W/O CNTR FLWD CNTR: CPT | Mod: 26,,, | Performed by: RADIOLOGY

## 2024-04-01 RX ORDER — GADOBUTROL 604.72 MG/ML
5 INJECTION INTRAVENOUS
Status: COMPLETED | OUTPATIENT
Start: 2024-04-01 | End: 2024-04-01

## 2024-04-01 RX ADMIN — GADOBUTROL 5 ML: 604.72 INJECTION INTRAVENOUS at 10:04

## 2024-06-05 ENCOUNTER — TELEPHONE (OUTPATIENT)
Dept: INTERNAL MEDICINE | Facility: CLINIC | Age: 74
End: 2024-06-05
Payer: MEDICARE

## 2024-06-05 DIAGNOSIS — Z00.00 ANNUAL PHYSICAL EXAM: ICD-10-CM

## 2024-06-05 DIAGNOSIS — E78.00 HYPERCHOLESTEREMIA: ICD-10-CM

## 2024-06-05 DIAGNOSIS — Z00.00 ENCOUNTER FOR PREVENTIVE HEALTH EXAMINATION: ICD-10-CM

## 2024-06-05 DIAGNOSIS — R73.03 PREDIABETES: ICD-10-CM

## 2024-06-05 DIAGNOSIS — R79.89 ELEVATED TSH: ICD-10-CM

## 2024-06-05 NOTE — TELEPHONE ENCOUNTER
----- Message from Yesy James sent at 6/4/2024  3:57 PM CDT -----  Regarding: order for labs  Type:  Patient Returning Call      Name of who is calling:patient        What is request in detail:patient is requesting a call back, she needs orders placed for blood work for upcoming annual appt. Just leave message as to when she can go, just leave message if no answer.        Can clinic reply by [facility]:no        [facility][phone]

## 2024-06-07 ENCOUNTER — LAB VISIT (OUTPATIENT)
Dept: LAB | Facility: OTHER | Age: 74
End: 2024-06-07
Attending: FAMILY MEDICINE
Payer: MEDICARE

## 2024-06-07 DIAGNOSIS — R79.89 ELEVATED TSH: ICD-10-CM

## 2024-06-07 DIAGNOSIS — R73.03 PREDIABETES: ICD-10-CM

## 2024-06-07 DIAGNOSIS — Z00.00 ANNUAL PHYSICAL EXAM: ICD-10-CM

## 2024-06-07 DIAGNOSIS — Z00.00 ENCOUNTER FOR PREVENTIVE HEALTH EXAMINATION: ICD-10-CM

## 2024-06-07 DIAGNOSIS — E78.00 HYPERCHOLESTEREMIA: ICD-10-CM

## 2024-06-07 LAB
ALBUMIN SERPL BCP-MCNC: 3.9 G/DL (ref 3.5–5.2)
ALP SERPL-CCNC: 56 U/L (ref 55–135)
ALT SERPL W/O P-5'-P-CCNC: 15 U/L (ref 10–44)
ANION GAP SERPL CALC-SCNC: 11 MMOL/L (ref 8–16)
AST SERPL-CCNC: 23 U/L (ref 10–40)
BILIRUB SERPL-MCNC: 0.7 MG/DL (ref 0.1–1)
BUN SERPL-MCNC: 21 MG/DL (ref 8–23)
CALCIUM SERPL-MCNC: 9.7 MG/DL (ref 8.7–10.5)
CHLORIDE SERPL-SCNC: 105 MMOL/L (ref 95–110)
CHOLEST SERPL-MCNC: 212 MG/DL (ref 120–199)
CHOLEST/HDLC SERPL: 3.2 {RATIO} (ref 2–5)
CO2 SERPL-SCNC: 26 MMOL/L (ref 23–29)
CREAT SERPL-MCNC: 0.8 MG/DL (ref 0.5–1.4)
EST. GFR  (NO RACE VARIABLE): >60 ML/MIN/1.73 M^2
ESTIMATED AVG GLUCOSE: 114 MG/DL (ref 68–131)
GLUCOSE SERPL-MCNC: 90 MG/DL (ref 70–110)
HBA1C MFR BLD: 5.6 % (ref 4–5.6)
HDLC SERPL-MCNC: 66 MG/DL (ref 40–75)
HDLC SERPL: 31.1 % (ref 20–50)
LDLC SERPL CALC-MCNC: 131.2 MG/DL (ref 63–159)
NONHDLC SERPL-MCNC: 146 MG/DL
POTASSIUM SERPL-SCNC: 4 MMOL/L (ref 3.5–5.1)
PROT SERPL-MCNC: 7.1 G/DL (ref 6–8.4)
SODIUM SERPL-SCNC: 142 MMOL/L (ref 136–145)
TRIGL SERPL-MCNC: 74 MG/DL (ref 30–150)
TSH SERPL DL<=0.005 MIU/L-ACNC: 2.28 UIU/ML (ref 0.4–4)

## 2024-06-07 PROCEDURE — 80061 LIPID PANEL: CPT | Mod: HCNC | Performed by: FAMILY MEDICINE

## 2024-06-07 PROCEDURE — 84443 ASSAY THYROID STIM HORMONE: CPT | Mod: HCNC | Performed by: FAMILY MEDICINE

## 2024-06-07 PROCEDURE — 36415 COLL VENOUS BLD VENIPUNCTURE: CPT | Mod: HCNC | Performed by: FAMILY MEDICINE

## 2024-06-07 PROCEDURE — 83036 HEMOGLOBIN GLYCOSYLATED A1C: CPT | Mod: HCNC | Performed by: FAMILY MEDICINE

## 2024-06-07 PROCEDURE — 80053 COMPREHEN METABOLIC PANEL: CPT | Mod: HCNC | Performed by: FAMILY MEDICINE

## 2024-06-12 ENCOUNTER — OFFICE VISIT (OUTPATIENT)
Dept: INTERNAL MEDICINE | Facility: CLINIC | Age: 74
End: 2024-06-12
Attending: FAMILY MEDICINE
Payer: MEDICARE

## 2024-06-12 VITALS
SYSTOLIC BLOOD PRESSURE: 138 MMHG | HEART RATE: 71 BPM | HEIGHT: 59 IN | WEIGHT: 90.25 LBS | OXYGEN SATURATION: 98 % | BODY MASS INDEX: 18.2 KG/M2 | DIASTOLIC BLOOD PRESSURE: 90 MMHG

## 2024-06-12 DIAGNOSIS — F41.1 GAD (GENERALIZED ANXIETY DISORDER): Chronic | ICD-10-CM

## 2024-06-12 DIAGNOSIS — K52.9 ENTERITIS: Primary | ICD-10-CM

## 2024-06-12 DIAGNOSIS — R63.4 UNINTENTIONAL WEIGHT LOSS: ICD-10-CM

## 2024-06-12 PROCEDURE — 3288F FALL RISK ASSESSMENT DOCD: CPT | Mod: HCNC,CPTII,S$GLB, | Performed by: FAMILY MEDICINE

## 2024-06-12 PROCEDURE — 1126F AMNT PAIN NOTED NONE PRSNT: CPT | Mod: HCNC,CPTII,S$GLB, | Performed by: FAMILY MEDICINE

## 2024-06-12 PROCEDURE — 1160F RVW MEDS BY RX/DR IN RCRD: CPT | Mod: HCNC,CPTII,S$GLB, | Performed by: FAMILY MEDICINE

## 2024-06-12 PROCEDURE — 99214 OFFICE O/P EST MOD 30 MIN: CPT | Mod: HCNC,S$GLB,, | Performed by: FAMILY MEDICINE

## 2024-06-12 PROCEDURE — 3080F DIAST BP >= 90 MM HG: CPT | Mod: HCNC,CPTII,S$GLB, | Performed by: FAMILY MEDICINE

## 2024-06-12 PROCEDURE — 3075F SYST BP GE 130 - 139MM HG: CPT | Mod: HCNC,CPTII,S$GLB, | Performed by: FAMILY MEDICINE

## 2024-06-12 PROCEDURE — 1101F PT FALLS ASSESS-DOCD LE1/YR: CPT | Mod: HCNC,CPTII,S$GLB, | Performed by: FAMILY MEDICINE

## 2024-06-12 PROCEDURE — 3008F BODY MASS INDEX DOCD: CPT | Mod: HCNC,CPTII,S$GLB, | Performed by: FAMILY MEDICINE

## 2024-06-12 PROCEDURE — 99999 PR PBB SHADOW E&M-EST. PATIENT-LVL III: CPT | Mod: PBBFAC,HCNC,, | Performed by: FAMILY MEDICINE

## 2024-06-12 PROCEDURE — 3044F HG A1C LEVEL LT 7.0%: CPT | Mod: HCNC,CPTII,S$GLB, | Performed by: FAMILY MEDICINE

## 2024-06-12 PROCEDURE — 1159F MED LIST DOCD IN RCRD: CPT | Mod: HCNC,CPTII,S$GLB, | Performed by: FAMILY MEDICINE

## 2024-06-12 NOTE — PROGRESS NOTES
"CHIEF COMPLAINT:    annual    HISTORY OF PRESENT ILLNESS: The patient is a remarkably healthy 74-year-old female.  The patient has no specific complaints today.   Since the last time I saw her she was in the hospital with rotavirus enteritis    She does have osteoporosis for which she used to use Prolia.  She was intolerant of bisphosphonates with jaw pain.    REVIEW OF SYSTEMS:  GENERAL: No fever, chills, fatigability or weight loss.  SKIN: No rashes, itching or changes in color or texture of skin.  HEAD: No headaches or recent head trauma.  EYES: Visual acuity fine. No photophobia, ocular pain or diplopia.  EARS: Denies ear pain, discharge or vertigo.  NOSE: No loss of smell, no epistaxis or postnasal drip.  MOUTH & THROAT: No hoarseness or change in voice. No excessive gum bleeding.  NODES: Denies swollen glands.  CHEST: Denies FREITAS, cyanosis, wheezing, cough and sputum production.  CARDIOVASCULAR: Denies chest pain, PND, orthopnea or reduced exercise tolerance.  ABDOMEN: Appetite fine. No weight loss. Denies diarrhea, abdominal pain, hematemesis or blood in stool.  URINARY: No flank pain, dysuria or hematuria.  PERIPHERAL VASCULAR: No claudication or cyanosis.  MUSCULOSKELETAL: No joint stiffness or swelling. Denies back pain. Except as above  NEUROLOGIC: No history of seizures, paralysis, alteration of gait or coordination.    SOCIAL HISTORY: The patient does not smoke.  The patient consumes alcohol socially.  The patient is .    PHYSICAL EXAMINATION:   Blood pressure (!) 138/90, pulse 71, height 4' 11" (1.499 m), weight 40.9 kg (90 lb 4.5 oz), SpO2 98%.    APPEARANCE: Well nourished, well developed, in no acute distress.    HEAD: Normocephalic, atraumatic.  EYES: PERRL. EOMI.  Conjunctivae without injection and  anicteric  NOSE: Mucosa pink. Airway clear.  MOUTH & THROAT: No tonsillar enlargement. No pharyngeal erythema or exudate. No stridor.  NECK: Supple.   NODES: No cervical, axillary or inguinal " lymph node enlargement.  CHEST: Lungs clear to auscultation.  No retractions are noted.  No rales or rhonchi are present.  CARDIOVASCULAR: Normal S1, S2. No rubs, murmurs or gallops.  ABDOMEN: Bowel sounds normal. Not distended. Soft. No tenderness or masses.  No ascites is noted.  MUSCULOSKELETAL:  There is no clubbing, cyanosis, or edema of the extremities x4.  There is full range of motion of the lumbar spine.  There is full range of motion of the extremities x4.  There is no deformity noted.    NEUROLOGIC:       Normal speech development.      Hearing normal.      Normal gait.      Motor and sensory exams grossly normal.  PSYCHIATRIC: Patient is alert and oriented x3.  Thought processes are all normal.  There is no homicidality.  There is no suicidality.  There is no evidence of psychosis.    LABORATORY/RADIOLOGY:   Chart reviewed.  We recently updated blood work.    ASSESSMENT:   Normal physical exam in an apparently healthy individual  Osteoporosis  Hyperlipidemia    PLAN:  Recent blood work was NL  Return to clinic in one year.

## 2024-07-29 ENCOUNTER — TELEPHONE (OUTPATIENT)
Dept: OBSTETRICS AND GYNECOLOGY | Facility: CLINIC | Age: 74
End: 2024-07-29
Payer: MEDICARE

## 2024-07-29 DIAGNOSIS — Z12.31 SCREENING MAMMOGRAM FOR BREAST CANCER: Primary | ICD-10-CM

## 2024-07-29 NOTE — TELEPHONE ENCOUNTER
----- Message from Rocio Diamond sent at 7/29/2024 11:27 AM CDT -----  Pt called she wanted to know if she can get a order put in for a DEXA, and her Mammogram she wanted to speak with someone in the office she can be reached at [phone]

## 2024-10-28 ENCOUNTER — HOSPITAL ENCOUNTER (OUTPATIENT)
Dept: RADIOLOGY | Facility: OTHER | Age: 74
Discharge: HOME | End: 2024-10-28
Attending: OBSTETRICS & GYNECOLOGY
Payer: MEDICARE

## 2024-10-28 DIAGNOSIS — Z12.31 SCREENING MAMMOGRAM FOR BREAST CANCER: ICD-10-CM

## 2024-10-28 PROCEDURE — 77063 BREAST TOMOSYNTHESIS BI: CPT | Mod: TC,HCNC

## 2024-10-28 PROCEDURE — 77067 SCR MAMMO BI INCL CAD: CPT | Mod: 26,HCNC,, | Performed by: RADIOLOGY

## 2024-10-28 PROCEDURE — 77067 SCR MAMMO BI INCL CAD: CPT | Mod: TC,HCNC

## 2024-10-28 PROCEDURE — 77063 BREAST TOMOSYNTHESIS BI: CPT | Mod: 26,HCNC,, | Performed by: RADIOLOGY

## 2024-11-01 ENCOUNTER — PATIENT MESSAGE (OUTPATIENT)
Dept: OBSTETRICS AND GYNECOLOGY | Facility: CLINIC | Age: 74
End: 2024-11-01
Payer: MEDICARE

## 2024-11-08 ENCOUNTER — OFFICE VISIT (OUTPATIENT)
Dept: OBSTETRICS AND GYNECOLOGY | Facility: CLINIC | Age: 74
End: 2024-11-08
Attending: OBSTETRICS & GYNECOLOGY
Payer: MEDICARE

## 2024-11-08 VITALS
SYSTOLIC BLOOD PRESSURE: 128 MMHG | BODY MASS INDEX: 19.47 KG/M2 | HEIGHT: 59 IN | WEIGHT: 96.56 LBS | DIASTOLIC BLOOD PRESSURE: 82 MMHG

## 2024-11-08 DIAGNOSIS — Z78.0 POST-MENOPAUSAL: ICD-10-CM

## 2024-11-08 DIAGNOSIS — M85.89 OSTEOPENIA OF MULTIPLE SITES: ICD-10-CM

## 2024-11-08 DIAGNOSIS — Z01.419 WOMEN'S ANNUAL ROUTINE GYNECOLOGICAL EXAMINATION: Primary | ICD-10-CM

## 2024-11-08 PROCEDURE — 99999 PR PBB SHADOW E&M-EST. PATIENT-LVL III: CPT | Mod: PBBFAC,HCNC,, | Performed by: OBSTETRICS & GYNECOLOGY

## 2024-11-08 NOTE — PROGRESS NOTES
"Alicia Toussaint is a 74 y.o. year old  female who presents for routine GYN exam.  She is postmenopausal, not taking hormones.  Denies bleeding, hot flashes, and night sweats.  History of osteopenia - taking calcium / vitamin D with weight bearing exercise.  Recent mammogram was negative.  Reports being admitted for gastroenteritis 2024.  Otherwise, denies recent changes in her medical / surgical history.  No GYN complaints.    Blood pressure 128/82, height 4' 11" (1.499 m), weight 43.8 kg (96 lb 9 oz).    10/31/2023 Pap: Negative    10/28/24 Mammogram: Negative, TC 1.8%     10/27/23 BMD: Osteopenia (Spine T-2.4, Hip T-1.4, T-1.1)      Past Medical History:   Diagnosis Date    Abnormal weight loss 2017    Arthritis 2018    Complication of anesthesia     colonoscopy-cardiac arrest from anes    Elevated TSH 2017    Hypercholesteremia 2017    Osteoporosis, post-menopausal     Vitamin D deficiency 2017       Past Surgical History:   Procedure Laterality Date    COLONOSCOPY      KNEE ARTHROPLASTY  1985    TUBAL LIGATION         OB History          2    Para   2    Term   2            AB        Living   2         SAB        IAB        Ectopic        Multiple        Live Births   2                   ROS:  GENERAL: Feeling well overall.   SKIN: Denies rash or lesions.   HEAD: Denies head injury or headache.   NODES: Denies enlarged lymph nodes.   CHEST: Denies chest pain or shortness of breath.   CARDIOVASCULAR: Denies palpitations or left sided chest pain.   ABDOMEN: No abdominal pain, nausea, vomiting or rectal bleeding.   URINARY: No dysuria or hematuria.  REPRODUCTIVE: See HPI.   BREASTS: Denies pain, lumps, or nipple discharge.   HEMATOLOGIC: No easy bruisability or excessive bleeding.   MUSCULOSKELETAL: Notes some joint pain.  NEUROLOGIC: Denies syncope or weakness.   PSYCHIATRIC: Denies depression.     PE:   (chaperone present during entire exam)  APPEARANCE: Well " nourished, well developed, in no acute distress.  BREASTS: Symmetrical, no skin changes or visible lesions. No palpable masses, nipple discharge or adenopathy bilaterally.  ABDOMEN: Soft. No tenderness or masses.   VULVA: Atrophic.  No lesions. Normal female genitalia.  URETHRAL MEATUS: Normal size and location, no lesions, no prolapse.  URETHRA: No masses, tenderness, prolapse or scarring.  VAGINA: Atrophic.  No lesions, no abnormal discharge, no significant cystocele or rectocele.  CERVIX: No lesions and discharge.   UTERUS: Normal size, regular shape, mobile, non-tender, bladder base nontender.  ADNEXA: No masses, tenderness or CDS nodularity.  ANUS PERINEUM: Normal.    Diagnosis:  1. Women's annual routine gynecological examination    2. Post-menopausal    3. Osteopenia of multiple sites          PLAN:         Patient was counseled today on postmenopausal issues.  We discussed her osteopenia- she will update her BMD next year.    Follow-up in 1 year.    This note was created with voice recognition software.  Grammatical, syntax and spelling errors may be inevitable.

## 2025-01-05 ENCOUNTER — NURSE TRIAGE (OUTPATIENT)
Dept: ADMINISTRATIVE | Facility: CLINIC | Age: 75
End: 2025-01-05
Payer: MEDICARE

## 2025-01-05 NOTE — TELEPHONE ENCOUNTER
"Spoke with pt who reports that she has syncopal episode while at [employer] today.States that she feels weak at this time. Denies CP. States that she did not eat much yesterday, and did not eat before going to [employer].Advised to be seen in ED verbalized understanding    Reason for Disposition   [1] Fainted > 15 minutes ago AND [2] still feels weak or dizzy    Additional Information   Negative: Still unconscious   Negative: Difficult to awaken or acting confused (e.g., disoriented, slurred speech)   Negative: Shock suspected (e.g., cold/pale/clammy skin, too weak to stand, low BP, rapid pulse)   Negative: Difficulty breathing   Negative: Bluish (or gray) lips or face now   Negative: Chest pain   Negative: Extra heartbeats, irregular heart beating, or heart is beating very fast  (i.e., "palpitations")   Negative: Bleeding (e.g., vomiting blood, rectal bleeding or tarry stools, severe vaginal bleeding)  (Exception: Fainted from sight of small amount of blood; small cut or abrasion.)   Negative: Fainted suddenly after medicine, allergic food or bee sting   Negative: Age > 50 years  (Exception: Occurred > 1 hour ago AND now feels completely fine.)   Negative: History of heart problems (e.g., congestive heart failure, heart attack)   Negative: [1] Fainted > 15 minutes ago AND [2] still feels too weak or dizzy to stand   Negative: Sounds like a life-threatening emergency to the triager   Negative: [1] Fainted > 15 minutes ago AND [2] still looks pale (pale skin, pallor)    Protocols used: [id number]    "

## 2025-01-06 ENCOUNTER — TELEPHONE (OUTPATIENT)
Dept: INTERNAL MEDICINE | Facility: CLINIC | Age: 75
End: 2025-01-06
Payer: MEDICARE

## 2025-01-06 NOTE — TELEPHONE ENCOUNTER
"States she did NOT go to Emergency Department but is feeling better. Scheduled to see Dr. Seweney on 01/15/25. Advised patient to go to closest Emergency Department to her current location if syncope returns or any other alarming symptoms develop between now and next appointment. Patient expressed understanding and gratitude for phone call.  Call ended.      "  View All Conversations on this Encounter   Tasia Osuna RN [place] Christopher J StaffYesterday (11:11 AM)     PADMINI  Please see attached encounter, thank you     Tasia Osuna RNYesterday (11:03 AM)     PADMIIN  Spoke with pt who reports that she has syncopal episode while at [employer] today.States that she feels weak at this time. Denies CP. States that she did not eat much yesterday, and did not eat before going to [facility].Advised to be seen in ED verbalized understanding     Reason for Disposition   [1] Fainted > 15 minutes ago AND [2] still feels weak or dizzy    Additional Information   Negative: Still unconscious   Negative: Difficult to awaken or acting confused (e.g., disoriented, slurred speech)   Negative: Shock suspected (e.g., cold/pale/clammy skin, too weak to stand, low BP, rapid pulse)   Negative: Difficulty breathing   Negative: Bluish (or gray) lips or face now   Negative: Chest pain   Negative: Extra heartbeats, irregular heart beating, or heart is beating very fast  (i.e., "palpitations")   Negative: Bleeding (e.g., vomiting blood, rectal bleeding or tarry stools, severe vaginal bleeding)  (Exception: Fainted from sight of small amount of blood; small cut or abrasion.)   Negative: Fainted suddenly after medicine, allergic food or bee sting   Negative: Age > 50 years  (Exception: Occurred > 1 hour ago AND now feels completely fine.)   Negative: History of heart problems (e.g., congestive heart failure, heart attack)   Negative: [1] Fainted > 15 minutes ago AND [2] still feels too weak or dizzy to stand   Negative: Sounds like a " "life-threatening emergency to the triager   Negative: [1] Fainted > 15 minutes ago AND [2] still looks pale (pale skin, pallor)    Protocols used: Hmoityls-X-LY           Note     Tasia Osuna, Hayes Sharmaga CELINA [phone]   "  "

## 2025-01-15 ENCOUNTER — OFFICE VISIT (OUTPATIENT)
Dept: INTERNAL MEDICINE | Facility: CLINIC | Age: 75
End: 2025-01-15
Attending: FAMILY MEDICINE
Payer: MEDICARE

## 2025-01-15 VITALS
SYSTOLIC BLOOD PRESSURE: 114 MMHG | OXYGEN SATURATION: 98 % | WEIGHT: 100.88 LBS | BODY MASS INDEX: 20.37 KG/M2 | DIASTOLIC BLOOD PRESSURE: 68 MMHG | HEART RATE: 83 BPM

## 2025-01-15 DIAGNOSIS — R93.89 ABNORMAL FINDINGS ON DIAGNOSTIC IMAGING OF OTHER SPECIFIED BODY STRUCTURES: ICD-10-CM

## 2025-01-15 DIAGNOSIS — R79.9 ABNORMAL FINDING OF BLOOD CHEMISTRY, UNSPECIFIED: ICD-10-CM

## 2025-01-15 DIAGNOSIS — R55 VASOVAGAL SYNCOPE: Primary | ICD-10-CM

## 2025-01-15 PROCEDURE — 99999 PR PBB SHADOW E&M-EST. PATIENT-LVL III: CPT | Mod: PBBFAC,HCNC,, | Performed by: FAMILY MEDICINE

## 2025-01-15 PROCEDURE — 3008F BODY MASS INDEX DOCD: CPT | Mod: HCNC,CPTII,S$GLB, | Performed by: FAMILY MEDICINE

## 2025-01-15 PROCEDURE — 1159F MED LIST DOCD IN RCRD: CPT | Mod: HCNC,CPTII,S$GLB, | Performed by: FAMILY MEDICINE

## 2025-01-15 PROCEDURE — 3288F FALL RISK ASSESSMENT DOCD: CPT | Mod: HCNC,CPTII,S$GLB, | Performed by: FAMILY MEDICINE

## 2025-01-15 PROCEDURE — 1160F RVW MEDS BY RX/DR IN RCRD: CPT | Mod: HCNC,CPTII,S$GLB, | Performed by: FAMILY MEDICINE

## 2025-01-15 PROCEDURE — 3078F DIAST BP <80 MM HG: CPT | Mod: HCNC,CPTII,S$GLB, | Performed by: FAMILY MEDICINE

## 2025-01-15 PROCEDURE — G2211 COMPLEX E/M VISIT ADD ON: HCPCS | Mod: HCNC,S$GLB,, | Performed by: FAMILY MEDICINE

## 2025-01-15 PROCEDURE — 1100F PTFALLS ASSESS-DOCD GE2>/YR: CPT | Mod: HCNC,CPTII,S$GLB, | Performed by: FAMILY MEDICINE

## 2025-01-15 PROCEDURE — 3074F SYST BP LT 130 MM HG: CPT | Mod: HCNC,CPTII,S$GLB, | Performed by: FAMILY MEDICINE

## 2025-01-15 PROCEDURE — 99215 OFFICE O/P EST HI 40 MIN: CPT | Mod: HCNC,S$GLB,, | Performed by: FAMILY MEDICINE

## 2025-01-15 NOTE — PROGRESS NOTES
CHIEF COMPLAINT:  Syncope    HISTORY OF PRESENT ILLNESS: The patient is a remarkably healthy 74-year-old female.  The patient had 10 days ago syncope in [facility].  No seizure activity.  No palpitations or cardiac symptoms.  No workup yet.  No recurrent episodes    She does have osteoporosis for which she used to use Prolia.  She was intolerant of bisphosphonates with jaw pain.    REVIEW OF SYSTEMS:  GENERAL: No fever, chills, fatigability or weight loss.  SKIN: No rashes, itching or changes in color or texture of skin.  HEAD: No headaches or recent head trauma.  EYES: Visual acuity fine. No photophobia, ocular pain or diplopia.  EARS: Denies ear pain, discharge or vertigo.  NOSE: No loss of smell, no epistaxis or postnasal drip.  MOUTH & THROAT: No hoarseness or change in voice. No excessive gum bleeding.  NODES: Denies swollen glands.  CHEST: Denies FREITAS, cyanosis, wheezing, cough and sputum production.  CARDIOVASCULAR: Denies chest pain, PND, orthopnea or reduced exercise tolerance.  ABDOMEN: Appetite fine. No weight loss. Denies diarrhea, abdominal pain, hematemesis or blood in stool.  URINARY: No flank pain, dysuria or hematuria.  PERIPHERAL VASCULAR: No claudication or cyanosis.  MUSCULOSKELETAL: No joint stiffness or swelling. Denies back pain. Except as above  NEUROLOGIC: No history of seizures, paralysis, alteration of gait or coordination.    SOCIAL HISTORY: The patient does not smoke.  The patient consumes alcohol socially.  The patient is .    PHYSICAL EXAMINATION:   Blood pressure 114/68, pulse 83, weight 45.8 kg (100 lb 13.8 oz), SpO2 98%.    APPEARANCE: Well nourished, well developed, in no acute distress.    HEAD: Normocephalic, atraumatic.  EYES: PERRL. EOMI.  Conjunctivae without injection and  anicteric  NOSE: Mucosa pink. Airway clear.  MOUTH & THROAT: No tonsillar enlargement. No pharyngeal erythema or exudate. No stridor.  NECK: Supple.   NODES: No cervical, axillary or inguinal lymph node  enlargement.  CHEST: Lungs clear to auscultation.  No retractions are noted.  No rales or rhonchi are present.  CARDIOVASCULAR: Normal S1, S2. No rubs, murmurs or gallops.  ABDOMEN: Bowel sounds normal. Not distended. Soft. No tenderness or masses.  No ascites is noted.  MUSCULOSKELETAL:  There is no clubbing, cyanosis, or edema of the extremities x4.  There is full range of motion of the lumbar spine.  There is full range of motion of the extremities x4.  There is no deformity noted.    NEUROLOGIC:       Normal speech development.      Hearing normal.      Normal gait.      Motor and sensory exams grossly normal.  PSYCHIATRIC: Patient is alert and oriented x3.  Thought processes are all normal.  There is no homicidality.  There is no suicidality.  There is no evidence of psychosis.    LABORATORY/RADIOLOGY:   Chart reviewed.      ASSESSMENT:   Syncopal episode  Osteoporosis  Hyperlipidemia    PLAN:  BW today  Reassured  Would like to be called with results  Return to clinic in one year.

## 2025-01-17 ENCOUNTER — LAB VISIT (OUTPATIENT)
Dept: LAB | Facility: OTHER | Age: 75
End: 2025-01-17
Attending: FAMILY MEDICINE
Payer: MEDICARE

## 2025-01-17 DIAGNOSIS — R93.89 ABNORMAL FINDINGS ON DIAGNOSTIC IMAGING OF OTHER SPECIFIED BODY STRUCTURES: ICD-10-CM

## 2025-01-17 DIAGNOSIS — R79.9 ABNORMAL FINDING OF BLOOD CHEMISTRY, UNSPECIFIED: ICD-10-CM

## 2025-01-17 DIAGNOSIS — R55 VASOVAGAL SYNCOPE: ICD-10-CM

## 2025-01-17 LAB
ALBUMIN SERPL BCP-MCNC: 3.8 G/DL (ref 3.5–5.2)
ALP SERPL-CCNC: 64 U/L (ref 40–150)
ALT SERPL W/O P-5'-P-CCNC: 19 U/L (ref 10–44)
ANION GAP SERPL CALC-SCNC: 8 MMOL/L (ref 8–16)
AST SERPL-CCNC: 22 U/L (ref 10–40)
BASOPHILS # BLD AUTO: 0.03 K/UL (ref 0–0.2)
BASOPHILS NFR BLD: 0.5 % (ref 0–1.9)
BILIRUB SERPL-MCNC: 0.5 MG/DL (ref 0.1–1)
BUN SERPL-MCNC: 19 MG/DL (ref 8–23)
CALCIUM SERPL-MCNC: 9.7 MG/DL (ref 8.7–10.5)
CHLORIDE SERPL-SCNC: 107 MMOL/L (ref 95–110)
CO2 SERPL-SCNC: 26 MMOL/L (ref 23–29)
CREAT SERPL-MCNC: 0.7 MG/DL (ref 0.5–1.4)
DIFFERENTIAL METHOD BLD: NORMAL
EOSINOPHIL # BLD AUTO: 0.3 K/UL (ref 0–0.5)
EOSINOPHIL NFR BLD: 4.2 % (ref 0–8)
ERYTHROCYTE [DISTWIDTH] IN BLOOD BY AUTOMATED COUNT: 13.7 % (ref 11.5–14.5)
EST. GFR  (NO RACE VARIABLE): >60 ML/MIN/1.73 M^2
ESTIMATED AVG GLUCOSE: 111 MG/DL (ref 68–131)
GLUCOSE SERPL-MCNC: 93 MG/DL (ref 70–110)
HBA1C MFR BLD: 5.5 % (ref 4–5.6)
HCT VFR BLD AUTO: 44.1 % (ref 37–48.5)
HGB BLD-MCNC: 14.2 G/DL (ref 12–16)
IMM GRANULOCYTES # BLD AUTO: 0.02 K/UL (ref 0–0.04)
IMM GRANULOCYTES NFR BLD AUTO: 0.3 % (ref 0–0.5)
LYMPHOCYTES # BLD AUTO: 2.5 K/UL (ref 1–4.8)
LYMPHOCYTES NFR BLD: 39.4 % (ref 18–48)
MCH RBC QN AUTO: 29 PG (ref 27–31)
MCHC RBC AUTO-ENTMCNC: 32.2 G/DL (ref 32–36)
MCV RBC AUTO: 90 FL (ref 82–98)
MONOCYTES # BLD AUTO: 0.7 K/UL (ref 0.3–1)
MONOCYTES NFR BLD: 10.7 % (ref 4–15)
NEUTROPHILS # BLD AUTO: 2.9 K/UL (ref 1.8–7.7)
NEUTROPHILS NFR BLD: 44.9 % (ref 38–73)
NRBC BLD-RTO: 0 /100 WBC
PLATELET # BLD AUTO: 245 K/UL (ref 150–450)
PMV BLD AUTO: 10.5 FL (ref 9.2–12.9)
POTASSIUM SERPL-SCNC: 4.3 MMOL/L (ref 3.5–5.1)
PROT SERPL-MCNC: 7.2 G/DL (ref 6–8.4)
RBC # BLD AUTO: 4.89 M/UL (ref 4–5.4)
SODIUM SERPL-SCNC: 141 MMOL/L (ref 136–145)
TSH SERPL DL<=0.005 MIU/L-ACNC: 3.97 UIU/ML (ref 0.4–4)
WBC # BLD AUTO: 6.44 K/UL (ref 3.9–12.7)

## 2025-01-17 PROCEDURE — 85025 COMPLETE CBC W/AUTO DIFF WBC: CPT | Mod: HCNC | Performed by: FAMILY MEDICINE

## 2025-01-17 PROCEDURE — 84443 ASSAY THYROID STIM HORMONE: CPT | Mod: HCNC | Performed by: FAMILY MEDICINE

## 2025-01-17 PROCEDURE — 80053 COMPREHEN METABOLIC PANEL: CPT | Mod: HCNC | Performed by: FAMILY MEDICINE

## 2025-01-17 PROCEDURE — 83036 HEMOGLOBIN GLYCOSYLATED A1C: CPT | Mod: HCNC | Performed by: FAMILY MEDICINE

## 2025-01-28 ENCOUNTER — TELEPHONE (OUTPATIENT)
Dept: INTERNAL MEDICINE | Facility: CLINIC | Age: 75
End: 2025-01-28

## 2025-01-28 NOTE — TELEPHONE ENCOUNTER
----- Message from Ranjit Sweeney MD sent at 1/28/2025  4:37 PM CST -----  Patient would like to be called with results.  Sorry for the delay.    Laboratory is normal.  No changes in medications.  Followup as scheduled.

## 2025-01-30 DIAGNOSIS — Z00.00 ENCOUNTER FOR MEDICARE ANNUAL WELLNESS EXAM: ICD-10-CM

## 2025-01-31 ENCOUNTER — TELEPHONE (OUTPATIENT)
Dept: INTERNAL MEDICINE | Facility: CLINIC | Age: 75
End: 2025-01-31
Payer: MEDICARE

## 2025-01-31 NOTE — TELEPHONE ENCOUNTER
----- Message from Jasper sent at 1/31/2025 10:32 AM CST -----  Regarding: SELF  Type: Patient returning call    Who Called:SELF  Who left message for patient:Dian Perea LPN  Does the patient know what this is regarding?PT WANTS BLOOD WORK RESULTS.   Would the patient rather a call back or a response via [facility]?CALL  Best call back number: [phone]    Additional Information:

## 2025-01-31 NOTE — TELEPHONE ENCOUNTER
"Spoke with the pt and informed her of her lab results.    "Laboratory is normal. No changes in medications. Followup as scheduled".  "

## 2025-03-06 ENCOUNTER — TELEPHONE (OUTPATIENT)
Dept: INTERNAL MEDICINE | Facility: CLINIC | Age: 75
End: 2025-03-06
Payer: MEDICARE

## 2025-03-06 NOTE — TELEPHONE ENCOUNTER
----- Message from Med Assistant Kaye sent at 3/6/2025  8:14 AM CST -----  Name of Who is Calling:JACQUELINE JUNE [[id number]]  What is the request in detail: Pt is [facility] be [facility] a [id number] [address] [facility]   Can the clinic reply by [facility]: No  What Number to Call Back if not in [facility]: [phone]

## 2025-03-06 NOTE — TELEPHONE ENCOUNTER
"LM with patient to call. Spoke to patient , to schedule a VV he said " he will call back when she can come in  "

## 2025-03-06 NOTE — TELEPHONE ENCOUNTER
----- Message from Bridgettetrangin sent at 3/6/2025 11:30 AM CST -----  Type : Patient Call  Who Called : Patient   Does the patient know what this is regarding?: Pt is requesting a call back in regards to a refill for a cough syrup that was prescribed to her in the ER. Pt has a bad cough and need something for her cough. Please Advise   Would the patient rather a call back or a response via [facility]? Call   Best Call Back Number: [phone]  Additional Information:

## 2025-03-11 ENCOUNTER — TELEPHONE (OUTPATIENT)
Dept: INTERNAL MEDICINE | Facility: CLINIC | Age: 75
End: 2025-03-11
Payer: MEDICARE

## 2025-03-11 NOTE — TELEPHONE ENCOUNTER
----- Message from Med Assistant Peguero sent at 3/7/2025  9:26 AM CST -----  Type:  Patient Returning CallWho Called:ptWho Left Message for Patient:Constantin Hong LPNDoes the patient know what this is regarding?:Virtual regarding cough 3/11Would the patient rather a call back or a response via [facility]?  Call backCallback Best Call Back Number:Telephone Information:Mobile          [phone] Additional Information:Thank you.

## 2025-03-11 NOTE — TELEPHONE ENCOUNTER
Schedule pt for 3/12/5 with QUINTON Mcghee to discuss the cough that she has been having for over a week. Pt verbalized understanding.

## 2025-03-12 ENCOUNTER — OFFICE VISIT (OUTPATIENT)
Dept: INTERNAL MEDICINE | Facility: CLINIC | Age: 75
End: 2025-03-12
Payer: MEDICARE

## 2025-03-12 VITALS
OXYGEN SATURATION: 98 % | WEIGHT: 102.94 LBS | SYSTOLIC BLOOD PRESSURE: 142 MMHG | HEART RATE: 87 BPM | HEIGHT: 59 IN | BODY MASS INDEX: 20.75 KG/M2 | DIASTOLIC BLOOD PRESSURE: 72 MMHG

## 2025-03-12 DIAGNOSIS — Z00.00 ANNUAL PHYSICAL EXAM: ICD-10-CM

## 2025-03-12 DIAGNOSIS — Z00.00 ENCOUNTER FOR PREVENTIVE HEALTH EXAMINATION: ICD-10-CM

## 2025-03-12 DIAGNOSIS — J06.9 VIRAL URI WITH COUGH: Primary | ICD-10-CM

## 2025-03-12 DIAGNOSIS — R03.0 ELEVATED BLOOD PRESSURE READING: ICD-10-CM

## 2025-03-12 DIAGNOSIS — E78.00 HYPERCHOLESTEREMIA: ICD-10-CM

## 2025-03-12 PROCEDURE — 99999 PR PBB SHADOW E&M-EST. PATIENT-LVL IV: CPT | Mod: PBBFAC,HCNC,,

## 2025-03-12 RX ORDER — DEXCHLORPHENIRAMINE MALEATE, DEXTROMETHORPHAN HBR, PHENYLEPHRINE HCL 1; 10; 5 MG/5ML; MG/5ML; MG/5ML
5 SYRUP ORAL
Qty: 480 ML | Refills: 0 | Status: SHIPPED | OUTPATIENT
Start: 2025-03-12

## 2025-03-12 NOTE — PATIENT INSTRUCTIONS
Monitor your blood pressure at home and let me know if trend >140/80. Follow low sodium diet. DASH diet.     Continue symptom mgmt for current cold:  -Mild symptoms, most likely viral etiology.  -based on clinical findings today, does not warrant Abx treatment at this time. If symptoms worsen, we will re-evaluate to assess the need to change the treatment plan.     -Warm tea with honey and lemon, and/or warm salt water gargles every 4 hours PRN for sore throat. May try OTC Cepacol if pt desires.  -advised frequent hand washing, rest, and plenty of fluids. Increase water intake to 64-80 oz daily to help thin mucus.  -Tylenol tablets as needed for fever, headaches, sore throat, ear pain, bodyaches, and/or nasal/sinus inflammation.   -Nasal Saline spray (Over the counter [place] spray or Ayr)  2 sprays each nostril 2-3 times a day for nasal congestion.      I counseled the patient on fluids, rest, OTC medications that can safely be used, hand/cough hygiene, expected course of illness, and when further medical attention would be warranted.      Symptom mgmt for URI/colds:     - You can take over-the-counter claritin, zyrtec, allegra, or xyzal as directed. These are antihistamines that can help with runny nose, nasal congestion, sneezing, and helps to dry up post-nasal drip, which usually causes sore throat and cough.              - You can use Flonase (fluticasone) nasal spray as directed for sinus congestion and postnasal drip. This is a steroid nasal spray that works locally over time to decrease the inflammation in your nose/sinuses and help with allergic symptoms. This is not an quick- relief spray like afrin, but it works well if used daily.  Discontinue if you develop nose bleed  - use nasal saline prior to Flonase.  - Use Ocean Spray Nasal Saline 1-3 puffs each nostril every 2-3 hours then blow out onto tissue. This is to irrigate the nasal passage way to clear the sinus openings. Use until sinus problem  resolved.     - you can take plain Mucinex (guaifenesin) 1200 mg twice a day to help loosen mucous.      -warm salt water gargles can help with sore throat     - warm tea with honey can help with cough. Honey is a natural cough suppressant.     - Dextromethorphan (DM) is a cough suppressant over the counter (ie. mucinex DM, robitussin, delsym; dayquil/nyquil has DM as well.)     - Go to the ER if you develop new or worsening symptoms.

## 2025-03-12 NOTE — PROGRESS NOTES
HPI     Chief Complaint:  Chief Complaint   Patient presents with    Sore Throat    Cough     For a week       Alicia Toussaint is a 74 y.o. female with multiple medical diagnoses as listed in the medical history and problem list that presents for   Chief Complaint   Patient presents with    Sore Throat    Cough     For a week   .   Patient is not known to me with her last appointment in this department on 1/15/2025.      HPI    Symptoms since last Tuesday, approx 8 days ago. Had [employer] party and lots of people over. First developed sore throat and now coughing for 1 week. Hard because she works full time. Using cough drops and polytussin DM. Helping some. Cough is not improving. Also taking Robutussin DM without any relief.     No asthma or COPD history  Nonsmoker    No wheezing, mild productice cough with white phlegm.   No ear pain   No sore throat  No fever    Has been waking up coughing    Took Polytussin DM last night with relief  Staying hydrated with water  Normal BM  Normal urine output  Assessment & Plan       1. Viral URI with cough  -Mild symptoms, most likely viral etiology.  -based on clinical findings today, does not warrant Abx treatment at this time. D/w pt about the potential risks of inappropriate Abx use and that if patient's Sx worsens, we will re-evaluate to assess the need to change the treatment plan.     -Warm tea with honey and lemon, and/or warm salt water gargles every 4 hours PRN for sore throat. May try OTC Cepacol if pt desires.  -advised frequent hand washing, rest, and plenty of fluids. Increase water intake to 64-80 oz daily to help thin mucus.  -Tylenol tablets as needed for fever, headaches, sore throat, ear pain, bodyaches, and/or nasal/sinus inflammation.   -Nasal Saline spray (Over the counter [place] spray or Ayr)  2 sprays each nostril 2-3 times a day for nasal congestion.      I counseled the patient on fluids, rest, OTC medications that can safely be used, hand/cough hygiene,  expected course of illness, and when further medical attention would be warranted.      -     dexchlorphen-phenylephrine-DM (POLYTUSSIN DM,DEXCHLORPHENRMN,) 1-5-10 mg/5 mL Syrp; Take 5 mLs by mouth every 6 to 8 hours as needed (cough).  Dispense: 480 mL; Refill: 0    2. Annual physical exam  Future labs prior to appt with me  -     Comprehensive Metabolic Panel; Future; Expected date: 06/12/2025  -     CBC Auto Differential; Future; Expected date: 06/12/2025    3. Hypercholesteremia  Future labs prior to appt me me, fasting. Diet controlled.   Lab Results   Component Value Date    CHOL 212 (H) 06/07/2024    CHOL 191 05/02/2023    CHOL 197 08/10/2022     Lab Results   Component Value Date    HDL 66 06/07/2024    HDL 61 05/02/2023    HDL 66 08/10/2022     Lab Results   Component Value Date    LDLCALC 131.2 06/07/2024    LDLCALC 117.0 05/02/2023    LDLCALC 119.0 08/10/2022     Lab Results   Component Value Date    TRIG 74 06/07/2024    TRIG 65 05/02/2023    TRIG 60 08/10/2022       Lab Results   Component Value Date    CHOLHDL 31.1 06/07/2024    CHOLHDL 31.9 05/02/2023    CHOLHDL 33.5 08/10/2022       -     TSH; Future; Expected date: 06/12/2025  -     Lipid Panel; Future; Expected date: 06/12/2025  5. Encounter for preventive health examination  Lab Results   Component Value Date    TSH 3.968 01/17/2025     -     TSH; Future; Expected date: 06/12/2025    6. Elevated blood pressure reading  Pt reports home reading controlled, will monitor at home and inform me of elevated readings  F/u with me in 3 months with labs  No decongestants  -     CBC Auto Differential; Future; Expected date: 06/12/2025          --------------------------------------------      Health Maintenance:  Health Maintenance         Date Due Completion Date    Shingles Vaccine (2 of 3) 11/06/2015 9/11/2015    COVID-19 Vaccine (5 - 2024-25 season) 09/01/2024 9/8/2022    Lipid Panel 06/07/2025 6/7/2024    Mammogram 10/28/2025 10/28/2024    Hemoglobin  A1c (Prediabetes) 01/17/2026 1/17/2025    TETANUS VACCINE 10/05/2026 10/5/2016    Colorectal Cancer Screening 10/25/2026 10/25/2021    Override on 5/6/2009: Done    DEXA Scan 10/27/2026 10/27/2023    RSV Vaccine (Age 60+ and Pregnant patients) (1 - 1-dose 75+ series) 05/28/2027 ---            Health maintenance reviewed    Follow Up:  Follow up in about 3 months (around 6/12/2025) for appt with PCP.    Exam     Review of Systems:  (as noted above)  Review of Systems    Physical Exam:   Physical Exam  Constitutional:       General: She is not in acute distress.     Appearance: Normal appearance. She is not ill-appearing, toxic-appearing or diaphoretic.   HENT:      Head: Normocephalic and atraumatic.      Right Ear: A middle ear effusion is present. Tympanic membrane is not erythematous or bulging.      Left Ear: Tympanic membrane normal.  No middle ear effusion. Tympanic membrane is not erythematous or bulging.      Nose: Nose normal. No congestion.      Mouth/Throat:      Pharynx: Posterior oropharyngeal erythema present. No oropharyngeal exudate.   Eyes:      General: Allergic shiner present.      Conjunctiva/sclera: Conjunctivae normal.   Cardiovascular:      Rate and Rhythm: Normal rate and regular rhythm.      Pulses: Normal pulses.      Heart sounds: Normal heart sounds. No murmur heard.  Pulmonary:      Effort: Pulmonary effort is normal. No respiratory distress.      Breath sounds: Normal breath sounds. No wheezing.   Abdominal:      General: Bowel sounds are normal.      Palpations: Abdomen is soft. There is no hepatomegaly or mass.      Tenderness: There is no abdominal tenderness. There is no right CVA tenderness or left CVA tenderness. Negative signs include Garg's sign.   Musculoskeletal:         General: Normal range of motion.      Cervical back: Normal range of motion and neck supple. No rigidity.      Right lower leg: No edema.      Left lower leg: No edema.   Lymphadenopathy:      Head:       "Right side of head: No submental, submandibular, tonsillar, preauricular, posterior auricular or occipital adenopathy.      Left side of head: No submental, submandibular, tonsillar, preauricular or posterior auricular adenopathy.      Cervical: No cervical adenopathy.   Skin:     General: Skin is warm.      Capillary Refill: Capillary refill takes less than 2 seconds.      Findings: No bruising.   Neurological:      General: No focal deficit present.      Mental Status: She is alert and oriented to person, place, and time.      Gait: Gait normal.   Psychiatric:         Mood and Affect: Mood normal.       Vitals:    03/12/25 1321 03/12/25 1344   BP: (!) 145/78 (!) 142/72   BP Location: Left arm Left arm   Patient Position: Sitting Sitting   Pulse: 87    SpO2: 98%    Weight: 46.7 kg (102 lb 15.3 oz)    Height: 4' 11" (1.499 m)       Body mass index is 20.79 kg/m².    Lab Results   Component Value Date    WBC 6.44 01/17/2025    HGB 14.2 01/17/2025    HCT 44.1 01/17/2025     01/17/2025    CHOL 212 (H) 06/07/2024    TRIG 74 06/07/2024    HDL 66 06/07/2024    ALT 19 01/17/2025    AST 22 01/17/2025     01/17/2025    K 4.3 01/17/2025     01/17/2025    CREATININE 0.7 01/17/2025    BUN 19 01/17/2025    CO2 26 01/17/2025    TSH 3.968 01/17/2025    HGBA1C 5.5 01/17/2025       The 10-year ASCVD risk score (Albaro DK, et al., 2019) is: 14.1%    Values used to calculate the score:      Age: 74 years      Sex: Female      Is Non- : No      Diabetic: No      Tobacco smoker: No      Systolic Blood Pressure: 142 mmHg      Is BP treated: No      HDL Cholesterol: 66 mg/dL      Total Cholesterol: 212 mg/dL    (Imaging have been independently reviewed)    History     Past Medical History:  Past Medical History:   Diagnosis Date    Abnormal weight loss 7/23/2017    Arthritis 12/30/2018    Complication of anesthesia 2009    colonoscopy-cardiac arrest from anes    Elevated TSH 7/23/2017    " Hypercholesteremia 7/23/2017    Osteoporosis, post-menopausal     Vitamin D deficiency 7/23/2017       Past Surgical History:  Past Surgical History:   Procedure Laterality Date    COLONOSCOPY  2016    KNEE ARTHROPLASTY  1985    TUBAL LIGATION         Social History:  Social History[1]    Family History:  Family History   Problem Relation Name Age of Onset    Parkinsonism Mother      Coronary artery disease Father      Ovarian cancer Sister  45    Cancer Sister          ovarian    Heart attack Brother      No Known Problems Daughter Karena     No Known Problems Son Jean Pierre     Cancer Maternal Grandmother      Stroke Paternal Grandfather      Breast cancer Neg Hx      Colon cancer Neg Hx         Allergies and Medications: (updated and reviewed)  Review of patient's allergies indicates:   Allergen Reactions    Aspirin Swelling    Augmentin [amoxicillin-pot clavulanate] Diarrhea    Tessalon [benzonatate] Rash     Current Medications[2]    Patient Care Team:  Ranjit Sweeney MD as PCP - General (Family Medicine)         - The patient is given an After Visit Summary that lists all medications with directions, allergies, education, orders placed during this encounter and follow-up instructions.      - I have reviewed the patient's medical information including past medical, family, and social history sections including the medications and allergies.      - We discussed the patient's current medications.     This note was created by combination of typed  and MModal dictation.  Transcription errors may be present.  If there are any questions, please contact me.                 [1]   Social History  Socioeconomic History    Marital status:    Tobacco Use    Smoking status: Never    Smokeless tobacco: Never   Substance and Sexual Activity    Alcohol use: Yes     Comment: 1/2 glass wine occasionally    Drug use: No    Sexual activity: Yes     Partners: Male     Birth control/protection: Post-menopausal      Social Drivers of Health     Financial Resource Strain: Low Risk  (2/28/2023)    Overall Financial Resource Strain (CARDIA)     Difficulty of Paying Living Expenses: Not hard at all   Food Insecurity: No Food Insecurity (2/28/2023)    Hunger Vital Sign     Worried About Running Out of Food in the Last Year: Never true     Ran Out of Food in the Last Year: Never true   Transportation Needs: No Transportation Needs (2/28/2023)    PRAPARE - Transportation     Lack of Transportation (Medical): No     Lack of Transportation (Non-Medical): No   Physical Activity: Sufficiently Active (2/28/2023)    Exercise Vital Sign     Days of Exercise per Week: 5 days     Minutes of Exercise per Session: 50 min   Stress: No Stress Concern Present (2/28/2023)    [employer] [place] [employer] - Occupational Stress Questionnaire     Feeling of Stress : Not at all   Housing Stability: Unknown (2/28/2023)    Housing Stability Vital Sign     Unable to Pay for Housing in the Last Year: No     Unstable Housing in the Last Year: No   [2]   Current Outpatient Medications   Medication Sig Dispense Refill    ascorbic acid, vitamin C, (VITAMIN C) 100 MG tablet Take 100 mg by mouth once daily.      calcium carbonate 1250 MG capsule Take 1,250 mg by mouth 2 (two) times daily with meals.      calcium carbonate/vitamin D3 (CALCIUM WITH VITAMIN D ORAL) Take by mouth. Takes two a day, unsure of dose      cyanocobalamin, vitamin B-12, (B-12 DOTS ORAL) Take by mouth.      fish oil-omega-3 fatty acids 300-1,000 mg capsule Take 2 g by mouth once daily.      Lactobacillus acidophilus (PROBIOTIC ORAL) Take by mouth.      LUTEIN ORAL Take by mouth.      multivitamin (THERAGRAN) per tablet Take 1 tablet by mouth once daily.        dexchlorphen-phenylephrine-DM (POLYTUSSIN DM,DEXCHLORPHENRMN,) 1-5-10 mg/5 mL Syrp Take 5 mLs by mouth every 6 to 8 hours as needed (cough). 480 mL 0     No current facility-administered medications for this visit.

## 2025-03-13 ENCOUNTER — PATIENT MESSAGE (OUTPATIENT)
Dept: ADMINISTRATIVE | Facility: CLINIC | Age: 75
End: 2025-03-13
Payer: MEDICARE

## 2025-03-19 ENCOUNTER — TELEPHONE (OUTPATIENT)
Dept: INTERNAL MEDICINE | Facility: CLINIC | Age: 75
End: 2025-03-19
Payer: MEDICARE

## 2025-03-19 DIAGNOSIS — J20.8 ACUTE BACTERIAL BRONCHITIS: Primary | ICD-10-CM

## 2025-03-19 DIAGNOSIS — B96.89 ACUTE BACTERIAL BRONCHITIS: Primary | ICD-10-CM

## 2025-03-19 RX ORDER — AZITHROMYCIN 250 MG/1
TABLET, FILM COATED ORAL
Qty: 6 TABLET | Refills: 0 | Status: SHIPPED | OUTPATIENT
Start: 2025-03-19 | End: 2025-03-24

## 2025-03-19 NOTE — TELEPHONE ENCOUNTER
3 weeks with cough now and no improvement. Congestion in chest. Agrees to zpak. Reviewed allergies.     BP improved to 120/70.

## 2025-03-19 NOTE — TELEPHONE ENCOUNTER
----- Message from Yeison Michelle sent at 3/19/2025 11:39 AM CDT -----    ----- Message -----  From: Cheri Sultana  Sent: 3/19/2025  11:25 AM CDT  To: Litzy Hooks Staff    Type : Patient Call  Who Called : Patient   Does the patient know what this is regarding?: Requesting a call back in regards to the pt still having a cough . Pt stated the medication prescribed is not working (dexchlorphen-phenylephrine-DM (POLYTUSSIN DM,DEXCHLORPHENRMN,) 1-5-10 mg/5 mL Syrp) ,. Pt stated she have been taking the medication since Saturday. Pt really wants the cough to go away . Please try to call the pt on both phone numbers due to the pt may be at work.  Please Advise   Would the patient rather a call back or a response via [facility]? Call    Best Call Back Number: [phone] or [phone]  Additional Information:

## 2025-03-20 ENCOUNTER — PATIENT MESSAGE (OUTPATIENT)
Dept: ADMINISTRATIVE | Facility: CLINIC | Age: 75
End: 2025-03-20
Payer: MEDICARE

## 2025-03-21 ENCOUNTER — TELEPHONE (OUTPATIENT)
Dept: ADMINISTRATIVE | Facility: CLINIC | Age: 75
End: 2025-03-21
Payer: MEDICARE

## 2025-03-21 NOTE — TELEPHONE ENCOUNTER
Called pt; informed pt I was calling to confirm her virtual EAWV on 3/24/25 at 7:30am and to see if she needed any help; pt stated she did not need any help and would complete e-pre check later today; pt informed to login 10 minutes prior to appt time

## 2025-03-24 ENCOUNTER — OFFICE VISIT (OUTPATIENT)
Dept: FAMILY MEDICINE | Facility: CLINIC | Age: 75
End: 2025-03-24
Payer: MEDICARE

## 2025-03-24 DIAGNOSIS — Z00.00 ENCOUNTER FOR MEDICARE ANNUAL WELLNESS EXAM: Primary | ICD-10-CM

## 2025-03-24 DIAGNOSIS — M85.80 OSTEOPENIA, UNSPECIFIED LOCATION: ICD-10-CM

## 2025-03-24 DIAGNOSIS — E78.00 HYPERCHOLESTEREMIA: ICD-10-CM

## 2025-03-24 DIAGNOSIS — F41.1 GAD (GENERALIZED ANXIETY DISORDER): Chronic | ICD-10-CM

## 2025-03-24 NOTE — PROGRESS NOTES
The patient location is: [place]  The chief complaint leading to consultation is: ANNUAL WELLNESS    Visit type: audiovisual    Face to Face time with patient: 15 minutes  45 minutes of total time spent on the encounter, which includes face to face time and non-face to face time preparing to see the patient (eg, review of tests), Obtaining and/or reviewing separately obtained history, Documenting clinical information in the electronic or other health record, Independently interpreting results (not separately reported) and communicating results to the patient/family/caregiver, or Care coordination (not separately reported).     Each patient to whom he or she provides medical services by telemedicine is:  (1) informed of the relationship between the physician and patient and the respective role of any other health care provider with respect to management of the patient; and (2) notified that he or she may decline to receive medical services by telemedicine and may withdraw from such care at any time.    Notes:       Alicia Toussaint presented for an initial Medicare AWV today. The following components were reviewed and updated:    Medical history  Family History  Social history  Allergies and Current Medications  Health Risk Assessment  Health Maintenance  Care Team    **See Completed Assessments for Annual Wellness visit with in the encounter summary    The following assessments were completed:  Depression Screening  Cognitive function Screening  Timed Get Up Test  Whisper Test    Opioid documentation:  Patient does not have a current opioid prescription.        There were no vitals filed for this visit.  There is no height or weight on file to calculate BMI.     Physical Exam  Vitals reviewed.   Constitutional:       General: She is not in acute distress.  Pulmonary:      Effort: Pulmonary effort is normal. No respiratory distress.   Neurological:      Mental Status: She is alert and oriented to person, place, and time.    Psychiatric:         Mood and Affect: Mood normal.         Behavior: Behavior normal.         Thought Content: Thought content normal.         Judgment: Judgment normal.     Diagnoses and health risks identified today and associated recommendations/orders:  1. Encounter for Medicare annual wellness exam  Reviewed and discussed health maintenance.    - Referral to Enhanced Annual Wellness Visit (eAWV) W+1    2. AMANDA (generalized anxiety disorder)  Stable- continue current treatment and follow up routinely with PCP     3. Hypercholesteremia  Stable- continue current treatment and follow up routinely with PCP   Encouraged healthy eating, weight loss and routine exercise   Lab Results   Component Value Date    CHOL 212 (H) 06/07/2024    CHOL 191 05/02/2023    CHOL 197 08/10/2022     Lab Results   Component Value Date    HDL 66 06/07/2024    HDL 61 05/02/2023    HDL 66 08/10/2022     Lab Results   Component Value Date    LDLCALC 131.2 06/07/2024    LDLCALC 117.0 05/02/2023    LDLCALC 119.0 08/10/2022     Lab Results   Component Value Date    TRIG 74 06/07/2024    TRIG 65 05/02/2023    TRIG 60 08/10/2022       Lab Results   Component Value Date    CHOLHDL 31.1 06/07/2024    CHOLHDL 31.9 05/02/2023    CHOLHDL 33.5 08/10/2022      4. Osteopenia, unspecified location  Stable- continue current treatment and follow up routinely with PCP   DEXA 10/2023  Continue otc ca/vit d daily    Provided Alicia with a 5-10 year written screening schedule and personal prevention plan. Recommendations were developed using the USPSTF age appropriate recommendations. Education, counseling, and referrals were provided as needed.  After Visit Summary printed and given to patient which includes a list of additional screenings\tests needed.    I offered to discuss advanced care planning, including how to pick a person who would make decisions for you if you were unable to make them for yourself, called a health care power of , and what kind  of decisions you might make such as use of life sustaining treatments such as ventilators and tube feeding when faced with a life limiting illness recorded on a living will that they will need to know. (How you want to be cared for as you near the end of your natural life)  Patient declined a discussion regarding advance directives at this time. Se will complete independently with his family   eMgan Palmer, NP

## 2025-03-24 NOTE — PATIENT INSTRUCTIONS
Counseling and Referral of Other Preventative  (Italic type indicates deductible and co-insurance are waived)    Patient Name: Alicia Toussaint  Today's Date: 3/24/2025    Health Maintenance       Date Due Completion Date    High Dose Statin Never done ---    Shingles Vaccine (2 of 3) 11/06/2015 9/11/2015    COVID-19 Vaccine (6 - 2024-25 season) 09/01/2024 9/8/2022    Lipid Panel 06/07/2025 6/7/2024    Mammogram 10/28/2025 10/28/2024    Hemoglobin A1c (Prediabetes) 01/17/2026 1/17/2025    TETANUS VACCINE 10/05/2026 10/5/2016    Colorectal Cancer Screening 10/25/2026 10/25/2021    Override on 5/6/2009: Done    DEXA Scan 10/27/2026 10/27/2023    RSV Vaccine (Age 60+ and Pregnant patients) (1 - 1-dose 75+ series) 05/28/2027 ---        No orders of the defined types were placed in this encounter.    The following information is provided to all patients.  This information is to help you find resources for any of the problems found today that may be affecting your health:                  Living healthy guide: www.[facility].[place].gov      Understanding Diabetes: www.diabetes.org      Eating healthy: www.cdc.gov/healthyweight      CDC home safety checklist: www.cdc.gov/steadi/patient.html      Agency on Aging: www.goea.[place].[facility]      Alcoholics anonymous (AA): www.aa.org      Physical Activity: www.[place].nih.gov/uv5fbfb      Tobacco use: www.quitwithusla.org

## 2025-04-09 ENCOUNTER — PATIENT OUTREACH (OUTPATIENT)
Dept: ADMINISTRATIVE | Facility: HOSPITAL | Age: 75
End: 2025-04-09
Payer: MEDICARE

## 2025-06-06 ENCOUNTER — LAB VISIT (OUTPATIENT)
Dept: LAB | Facility: OTHER | Age: 75
End: 2025-06-06
Payer: MEDICARE

## 2025-06-06 ENCOUNTER — RESULTS FOLLOW-UP (OUTPATIENT)
Dept: INTERNAL MEDICINE | Facility: CLINIC | Age: 75
End: 2025-06-06

## 2025-06-06 DIAGNOSIS — Z00.00 ENCOUNTER FOR PREVENTIVE HEALTH EXAMINATION: ICD-10-CM

## 2025-06-06 DIAGNOSIS — Z00.00 ANNUAL PHYSICAL EXAM: ICD-10-CM

## 2025-06-06 DIAGNOSIS — R03.0 ELEVATED BLOOD PRESSURE READING: ICD-10-CM

## 2025-06-06 DIAGNOSIS — E78.00 HYPERCHOLESTEREMIA: ICD-10-CM

## 2025-06-06 LAB
ABSOLUTE EOSINOPHIL (OHS): 0.23 K/UL
ABSOLUTE MONOCYTE (OHS): 0.62 K/UL (ref 0.3–1)
ABSOLUTE NEUTROPHIL COUNT (OHS): 2.61 K/UL (ref 1.8–7.7)
ALBUMIN SERPL BCP-MCNC: 3.9 G/DL (ref 3.5–5.2)
ALP SERPL-CCNC: 62 UNIT/L (ref 40–150)
ALT SERPL W/O P-5'-P-CCNC: 21 UNIT/L (ref 10–44)
ANION GAP (OHS): 11 MMOL/L (ref 8–16)
AST SERPL-CCNC: 26 UNIT/L (ref 11–45)
BASOPHILS # BLD AUTO: 0.04 K/UL
BASOPHILS NFR BLD AUTO: 0.6 %
BILIRUB SERPL-MCNC: 0.7 MG/DL (ref 0.1–1)
BUN SERPL-MCNC: 22 MG/DL (ref 8–23)
CALCIUM SERPL-MCNC: 9.6 MG/DL (ref 8.7–10.5)
CHLORIDE SERPL-SCNC: 108 MMOL/L (ref 95–110)
CHOLEST SERPL-MCNC: 209 MG/DL (ref 120–199)
CHOLEST/HDLC SERPL: 3 {RATIO} (ref 2–5)
CO2 SERPL-SCNC: 24 MMOL/L (ref 23–29)
CREAT SERPL-MCNC: 0.7 MG/DL (ref 0.5–1.4)
ERYTHROCYTE [DISTWIDTH] IN BLOOD BY AUTOMATED COUNT: 14.1 % (ref 11.5–14.5)
GFR SERPLBLD CREATININE-BSD FMLA CKD-EPI: >60 ML/MIN/1.73/M2
GLUCOSE SERPL-MCNC: 89 MG/DL (ref 70–110)
HCT VFR BLD AUTO: 43.8 % (ref 37–48.5)
HDLC SERPL-MCNC: 69 MG/DL (ref 40–75)
HDLC SERPL: 33 % (ref 20–50)
HGB BLD-MCNC: 14.3 GM/DL (ref 12–16)
IMM GRANULOCYTES # BLD AUTO: 0.02 K/UL (ref 0–0.04)
IMM GRANULOCYTES NFR BLD AUTO: 0.3 % (ref 0–0.5)
LDLC SERPL CALC-MCNC: 126.2 MG/DL (ref 63–159)
LYMPHOCYTES # BLD AUTO: 2.85 K/UL (ref 1–4.8)
MCH RBC QN AUTO: 29 PG (ref 27–31)
MCHC RBC AUTO-ENTMCNC: 32.6 G/DL (ref 32–36)
MCV RBC AUTO: 89 FL (ref 82–98)
NONHDLC SERPL-MCNC: 140 MG/DL
NUCLEATED RBC (/100WBC) (OHS): 0 /100 WBC
PLATELET # BLD AUTO: 229 K/UL (ref 150–450)
PMV BLD AUTO: 11.1 FL (ref 9.2–12.9)
POTASSIUM SERPL-SCNC: 4.2 MMOL/L (ref 3.5–5.1)
PROT SERPL-MCNC: 7.6 GM/DL (ref 6–8.4)
RBC # BLD AUTO: 4.93 M/UL (ref 4–5.4)
RELATIVE EOSINOPHIL (OHS): 3.6 %
RELATIVE LYMPHOCYTE (OHS): 44.7 % (ref 18–48)
RELATIVE MONOCYTE (OHS): 9.7 % (ref 4–15)
RELATIVE NEUTROPHIL (OHS): 41.1 % (ref 38–73)
SODIUM SERPL-SCNC: 143 MMOL/L (ref 136–145)
TRIGL SERPL-MCNC: 69 MG/DL (ref 30–150)
TSH SERPL-ACNC: 3.91 UIU/ML (ref 0.4–4)
WBC # BLD AUTO: 6.37 K/UL (ref 3.9–12.7)

## 2025-06-06 PROCEDURE — 84443 ASSAY THYROID STIM HORMONE: CPT

## 2025-06-06 PROCEDURE — 36415 COLL VENOUS BLD VENIPUNCTURE: CPT

## 2025-06-06 PROCEDURE — 85025 COMPLETE CBC W/AUTO DIFF WBC: CPT

## 2025-06-06 PROCEDURE — 82465 ASSAY BLD/SERUM CHOLESTEROL: CPT

## 2025-06-06 PROCEDURE — 82040 ASSAY OF SERUM ALBUMIN: CPT

## 2025-06-13 ENCOUNTER — OFFICE VISIT (OUTPATIENT)
Dept: INTERNAL MEDICINE | Facility: CLINIC | Age: 75
End: 2025-06-13
Payer: MEDICARE

## 2025-06-13 VITALS
WEIGHT: 104.63 LBS | SYSTOLIC BLOOD PRESSURE: 116 MMHG | HEIGHT: 59 IN | BODY MASS INDEX: 21.09 KG/M2 | OXYGEN SATURATION: 98 % | HEART RATE: 77 BPM | DIASTOLIC BLOOD PRESSURE: 80 MMHG

## 2025-06-13 DIAGNOSIS — K76.89 BENIGN LIVER CYST: ICD-10-CM

## 2025-06-13 DIAGNOSIS — Z00.00 VISIT FOR ANNUAL HEALTH EXAMINATION: Primary | ICD-10-CM

## 2025-06-13 DIAGNOSIS — Z91.89 10 YEAR RISK OF MI OR STROKE 7.5% OR GREATER: ICD-10-CM

## 2025-06-13 DIAGNOSIS — M85.88 OSTEOPENIA OF LUMBAR SPINE: ICD-10-CM

## 2025-06-13 DIAGNOSIS — Z23 NEED FOR SHINGLES VACCINE: ICD-10-CM

## 2025-06-13 DIAGNOSIS — Z12.31 ENCOUNTER FOR SCREENING MAMMOGRAM FOR BREAST CANCER: ICD-10-CM

## 2025-06-13 DIAGNOSIS — E78.00 HYPERCHOLESTEREMIA: ICD-10-CM

## 2025-06-13 PROCEDURE — 99999 PR PBB SHADOW E&M-EST. PATIENT-LVL IV: CPT | Mod: PBBFAC,,,

## 2025-06-13 RX ORDER — ZOSTER VACCINE RECOMBINANT, ADJUVANTED 50 MCG/0.5
0.5 KIT INTRAMUSCULAR ONCE
Qty: 1 EACH | Refills: 0 | Status: SHIPPED | OUTPATIENT
Start: 2025-06-13 | End: 2025-06-13

## 2025-06-13 NOTE — PROGRESS NOTES
"  HPI     Chief Complaint:  Chief Complaint   Patient presents with    Follow-up     Discuss lab work     Annual Exam       Alicia Toussaint is a 75 y.o. female with multiple medical diagnoses as listed in the medical history and problem list that presents for   Chief Complaint   Patient presents with    Follow-up     Discuss lab work     Annual Exam   .   Patient is not known to me with her last appointment in this department on 3/12/2025.      HPI    Walking to park, 2.5 miles  days per week and then pilates. Drinking more water now since weather has changed.     Annual labs    Statin  -fish oil  -heart hearthy diet  -no etoh; no smoking    Vit D daily 2k units of calcium plus vit d  Vit C   B12  Probiotic    GI  -Dr. Castellano, hospital admit last year, tx for colitis   -daily probiotic  -had weight loss then    [place] [facility] [place]    The 10-year ASCVD risk score (Albaro WASHINGTON, et al., 2019) is: 10.8%    Values used to calculate the score:      Age: 75 years      Sex: Female      Is Non- : No      Diabetic: No      Tobacco smoker: No      Systolic Blood Pressure: 116 mmHg      Is BP treated: No      HDL Cholesterol: 69 mg/dL      Total Cholesterol: 209 mg/dL    Social Factors  Tobacco use: none  Alcohol: n/a  Intimate partner violence screening  "Do you feel safe in your current relationship?" Yes lives at home with   Regular Exercise: yes    Depression  Over the past two weeks, have you felt down, depressed, or hopeless? No  Over the past two weeks, have you felt little interest or pleasure in doing things? No    Reproductive Health - Dr. Johnson, L[facility] 11/2024  Postmenopausal  Patient is sexually active. Admits painful intercourse  STD screening in last year: negative  Last PAP: 2023 with Dr. Jones  HIV screening: negative    CHD, HTN, DM2  CHD Risk Factors: advanced age (older than 55 for men, 65 for women) and dyslipidemia  Women 45 years and older should be screened for " "dyslipidemia if at increased risk of CHD  Women 20 to 45 years of age should be screened for dyslipidemia if at increased risk of CHD  Asymptomatic adults with sustained blood pressure greater than 135/80 mm Hg (treated or untreated) should be screened for type 2 diabetes mellitus    Estimated body mass index is 21.13 kg/m² as calculated from the following:    Height as of this encounter: 4' 11" (1.499 m).    Weight as of this encounter: 47.4 kg (104 lb 9.7 oz).    Screening  Mammogram needed: reviewed  due in 10/2025 (after 10/28)  Colonoscopy needed: due in 2026, will complete with Dr. Castellano  Osteoporosis screen needed: due in 2026; last showed osteopenia of lumbar spine and hips       Women 50 to 74 years of age should be screened for breast cancer with mammography biennially.  Women should be screened for cervical cancer with Pap tests beginning at 21 years of age. Low-risk women should receive Pap testing every three years. Co-testing for human papillomavirus is an option beginning at 30 years of age, and can extend the screening interval to five years. Cervical cancer screening should be discontinued at 65 years of age or after total hysterectomy if the woman has a benign gynecologic history  Adults 50 to 75 years of age should be screened for colorectal cancer with an FOBT annually, sigmoidoscopy every five years with an FOBT every three years, or colonoscopy every 10 years.  Women 65 years and older should be screened for osteoporosis. Women younger than 65 years should be screened if the risk of fracture is greater than or equal to that of a 65-year-old white woman without additional risk factors.    Assessment & Plan       1. Visit for annual health examination  Counseled on age appropriate medical preventative services including age appropriate cancer screenings, age appropriate eye and dental exams, over all nutritional health, need for a consistent exercise regimen, and an over all push towards " maintaining a vigorous and active lifestyle.  Counseled on age appropriate vaccines and discussed upcoming health care needs based on age/gender. Discussed good sleep hygiene and stress management.    Fasting labs reviewed with pt today, labs printed    2. Need for shingles vaccine  -     varicella-zoster gE-AS01B, PF, (SHINGRIX, PF,) 50 mcg/0.5 mL injection; Inject 0.5 mLs into the muscle once. for 1 dose  Dispense: 1 each; Refill: 0    3. Encounter for screening mammogram for breast cancer  F/b Dr. Johnson  Future order for mmg  -     Mammo Digital Screening Bilat w/ Billy (XPD); Future; Expected date: 10/29/2025    4. Osteopenia of lumbar spine  Vit d/calcium  Weight bearing exercises  Repeat DEXA 2026    5. Benign liver cyst  Lab Results   Component Value Date    ALT 21 06/06/2025    AST 26 06/06/2025    ALKPHOS 62 06/06/2025    BILITOT 0.7 06/06/2025     Stable, asymptomatic chronic condition.  Will continue to maximize risk factor reduction and adjust medication as needed      Noted on MRI from 2024:   Patient was administered 10 cc of Gadavist.  Gallbladder demonstrates no stone, wall thickening.  No intrahepatic bile duct dilatation is seen.  Bile duct measures 9.7 mm.  There is a small left lobe liver cyst.  No bile duct filling defects are seen.  Pancreatic duct is normal.  The liver, spleen, stomach, duodenum, the kidneys demonstrate nothing unusual.  No adenopathy is seen.  The vessels enhance normally.  The bowel loops demonstrate nothing unusual.     Impression:     Extrahepatic bile duct dilatation.  No filling defect or cause is identified.     Small left lobe liver cyst.     6. 10 year risk of MI or stroke 7.5% or greater  Declines statin at this time  Continue omega 3  Consider med diet, education provided    7. Hypercholesteremia  Assessment & Plan:  -continue omega 3  -education of med diet    Consider statin, pt declines at this  time                --------------------------------------------      Health Maintenance:  Health Maintenance         Date Due Completion Date    High Dose Statin Never done ---    Shingles Vaccine (2 of 3) 11/06/2015 9/11/2015    COVID-19 Vaccine (6 - 2024-25 season) 09/01/2024 9/8/2022    Mammogram 10/28/2025 10/28/2024    Hemoglobin A1c (Prediabetes) 01/17/2026 1/17/2025    Lipid Panel 06/06/2026 6/6/2025    TETANUS VACCINE 10/05/2026 10/5/2016    Colorectal Cancer Screening 10/25/2026 10/25/2021    Override on 5/6/2009: Done    DEXA Scan 10/27/2026 10/27/2023    RSV Vaccine (Age 60+ and Pregnant patients) (1 - 1-dose 75+ series) 05/28/2027 ---            Health maintenance reviewed, Shingles vaccine ordered, and Mammogram ordered    Follow Up:  No follow-ups on file.    Discussed DDx, condition, and treatment.   Education sent to patient portal/included in after visit summary.  ED precautions given.   Notify provider if symptoms do not resolve or increase in severity.   Patient verbalizes understanding and agrees with plan of care.    Exam     Review of Systems:  (as noted above)  Review of Systems    Physical Exam:   Physical Exam  Vitals reviewed.   Constitutional:       General: She is not in acute distress.     Appearance: Normal appearance. She is not toxic-appearing.   HENT:      Head: Normocephalic and atraumatic.   Neck:      Thyroid: No thyroid mass, thyromegaly or thyroid tenderness.   Cardiovascular:      Rate and Rhythm: Normal rate and regular rhythm.      Pulses: Normal pulses.      Heart sounds: Normal heart sounds. No murmur heard.  Pulmonary:      Effort: Pulmonary effort is normal. No respiratory distress.      Breath sounds: Normal breath sounds.   Musculoskeletal:      Cervical back: Normal range of motion. No rigidity.      Right lower leg: No edema.      Left lower leg: No edema.   Lymphadenopathy:      Cervical: No cervical adenopathy.   Skin:     General: Skin is warm.      Capillary  "Refill: Capillary refill takes less than 2 seconds.   Neurological:      General: No focal deficit present.      Mental Status: She is alert and oriented to person, place, and time.   Psychiatric:         Mood and Affect: Mood normal.       Vitals:    06/13/25 0828   BP: 116/80   BP Location: Left arm   Patient Position: Sitting   Pulse: 77   SpO2: 98%   Weight: 47.4 kg (104 lb 9.7 oz)   Height: 4' 11" (1.499 m)      Body mass index is 21.13 kg/m².    Lab Results   Component Value Date    WBC 6.37 06/06/2025    HGB 14.3 06/06/2025    HCT 43.8 06/06/2025     06/06/2025    CHOL 209 (H) 06/06/2025    TRIG 69 06/06/2025    HDL 69 06/06/2025    ALT 21 06/06/2025    AST 26 06/06/2025     06/06/2025    K 4.2 06/06/2025     06/06/2025    CREATININE 0.7 06/06/2025    BUN 22 06/06/2025    CO2 24 06/06/2025    TSH 3.912 06/06/2025    HGBA1C 5.5 01/17/2025       The 10-year ASCVD risk score (Albaro WASHINGTON, et al., 2019) is: 10.8%    Values used to calculate the score:      Age: 75 years      Sex: Female      Is Non- : No      Diabetic: No      Tobacco smoker: No      Systolic Blood Pressure: 116 mmHg      Is BP treated: No      HDL Cholesterol: 69 mg/dL      Total Cholesterol: 209 mg/dL    (Imaging have been independently reviewed)    History     Past Medical History:  Past Medical History:   Diagnosis Date    Abnormal weight loss 7/23/2017    Arthritis 12/30/2018    Complication of anesthesia 2009    colonoscopy-cardiac arrest from anes    Elevated TSH 7/23/2017    Hypercholesteremia 7/23/2017    Osteoporosis, post-menopausal     Vitamin D deficiency 7/23/2017       Past Surgical History:  Past Surgical History:   Procedure Laterality Date    COLONOSCOPY  2016    KNEE ARTHROPLASTY  1985    TUBAL LIGATION         Social History:  Social History[1]    Family History:  Family History   Problem Relation Name Age of Onset    Parkinsonism Mother      Coronary artery disease Father      Ovarian " cancer Sister  45    Cancer Sister          ovarian    Heart attack Brother      No Known Problems Daughter Karena     No Known Problems Son Jena Pierre     Cancer Maternal Grandmother      Stroke Paternal Grandfather      Breast cancer Neg Hx      Colon cancer Neg Hx         Allergies and Medications: (updated and reviewed)  Review of patient's allergies indicates:   Allergen Reactions    Aspirin Swelling    Augmentin [amoxicillin-pot clavulanate] Diarrhea    Tessalon [benzonatate] Rash     Current Medications[2]    Patient Care Team:  Ranjit Sweeney MD as PCP - General (Family Medicine)         - The patient is given an After Visit Summary that lists all medications with directions, allergies, education, orders placed during this encounter and follow-up instructions.      - I have reviewed the patient's medical information including past medical, family, and social history sections including the medications and allergies.      - We discussed the patient's current medications.     This note was created by combination of typed  and MModal dictation.  Transcription errors may be present.  If there are any questions, please contact me.                 [1]   Social History  Socioeconomic History    Marital status:    Tobacco Use    Smoking status: Never    Smokeless tobacco: Never   Substance and Sexual Activity    Alcohol use: Yes     Comment: 1/2 glass wine occasionally    Drug use: No    Sexual activity: Yes     Partners: Male     Birth control/protection: Post-menopausal     Social Drivers of Health     Financial Resource Strain: Low Risk  (3/21/2025)    Overall Financial Resource Strain (CARDIA)     Difficulty of Paying Living Expenses: Not hard at all   Food Insecurity: No Food Insecurity (3/21/2025)    Hunger Vital Sign     Worried About Running Out of Food in the Last Year: Never true     Ran Out of Food in the Last Year: Never true   Transportation Needs: No Transportation Needs (3/21/2025)     PRAPARE - Transportation     Lack of Transportation (Medical): No     Lack of Transportation (Non-Medical): No   Physical Activity: Sufficiently Active (3/21/2025)    Exercise Vital Sign     Days of Exercise per Week: 4 days     Minutes of Exercise per Session: 60 min   Stress: No Stress Concern Present (3/21/2025)    [employer] - Occupational Stress Questionnaire     Feeling of Stress : Only a little   Housing Stability: Low Risk  (3/21/2025)    Housing Stability Vital Sign     Unable to Pay for Housing in the Last Year: No     Number of Times Moved in the Last Year: 0     Homeless in the Last Year: No   [2]   Current Outpatient Medications   Medication Sig Dispense Refill    ascorbic acid, vitamin C, (VITAMIN C) 100 MG tablet Take 100 mg by mouth once daily.      calcium carbonate 1250 MG capsule Take 1,250 mg by mouth 2 (two) times daily with meals.      calcium carbonate/vitamin D3 (CALCIUM WITH VITAMIN D ORAL) Take by mouth. Takes two a day, unsure of dose      cyanocobalamin, vitamin B-12, (B-12 DOTS ORAL) Take by mouth.      dexchlorphen-phenylephrine-DM (POLYTUSSIN DM,DEXCHLORPHENRMN,) 1-5-10 mg/5 mL Syrp Take 5 mLs by mouth every 6 to 8 hours as needed (cough). 480 mL 0    fish oil-omega-3 fatty acids 300-1,000 mg capsule Take 2 g by mouth once daily.      Lactobacillus acidophilus (PROBIOTIC ORAL) Take by mouth.      LUTEIN ORAL Take by mouth.      multivitamin (THERAGRAN) per tablet Take 1 tablet by mouth once daily.        varicella-zoster gE-AS01B, PF, (SHINGRIX, PF,) 50 mcg/0.5 mL injection Inject 0.5 mLs into the muscle once. for 1 dose 1 each 0     No current facility-administered medications for this visit.

## 2025-08-11 ENCOUNTER — PATIENT MESSAGE (OUTPATIENT)
Dept: INTERNAL MEDICINE | Facility: CLINIC | Age: 75
End: 2025-08-11
Payer: MEDICARE

## 2025-08-11 ENCOUNTER — TELEPHONE (OUTPATIENT)
Dept: INTERNAL MEDICINE | Facility: CLINIC | Age: 75
End: 2025-08-11
Payer: MEDICARE